# Patient Record
Sex: MALE | Race: WHITE | NOT HISPANIC OR LATINO | Employment: OTHER | URBAN - METROPOLITAN AREA
[De-identification: names, ages, dates, MRNs, and addresses within clinical notes are randomized per-mention and may not be internally consistent; named-entity substitution may affect disease eponyms.]

---

## 2017-01-02 ENCOUNTER — COMMUNICATION - HEALTHEAST (OUTPATIENT)
Dept: INTERNAL MEDICINE | Facility: CLINIC | Age: 72
End: 2017-01-02

## 2017-01-03 ENCOUNTER — COMMUNICATION - HEALTHEAST (OUTPATIENT)
Dept: ONCOLOGY | Facility: HOSPITAL | Age: 72
End: 2017-01-03

## 2017-01-05 ENCOUNTER — COMMUNICATION - HEALTHEAST (OUTPATIENT)
Dept: INTERNAL MEDICINE | Facility: CLINIC | Age: 72
End: 2017-01-05

## 2017-01-06 ENCOUNTER — COMMUNICATION - HEALTHEAST (OUTPATIENT)
Dept: ONCOLOGY | Facility: HOSPITAL | Age: 72
End: 2017-01-06

## 2017-01-06 ENCOUNTER — COMMUNICATION - HEALTHEAST (OUTPATIENT)
Dept: INTERNAL MEDICINE | Facility: CLINIC | Age: 72
End: 2017-01-06

## 2017-01-15 ENCOUNTER — COMMUNICATION - HEALTHEAST (OUTPATIENT)
Dept: ONCOLOGY | Facility: HOSPITAL | Age: 72
End: 2017-01-15

## 2017-01-16 ENCOUNTER — AMBULATORY - HEALTHEAST (OUTPATIENT)
Dept: ONCOLOGY | Facility: HOSPITAL | Age: 72
End: 2017-01-16

## 2017-01-16 DIAGNOSIS — C61 PROSTATE CANCER (H): ICD-10-CM

## 2017-01-26 ENCOUNTER — COMMUNICATION - HEALTHEAST (OUTPATIENT)
Dept: ONCOLOGY | Facility: HOSPITAL | Age: 72
End: 2017-01-26

## 2017-02-10 ENCOUNTER — COMMUNICATION - HEALTHEAST (OUTPATIENT)
Dept: INTERNAL MEDICINE | Facility: CLINIC | Age: 72
End: 2017-02-10

## 2017-02-10 ENCOUNTER — COMMUNICATION - HEALTHEAST (OUTPATIENT)
Dept: ONCOLOGY | Facility: HOSPITAL | Age: 72
End: 2017-02-10

## 2017-02-16 ENCOUNTER — COMMUNICATION - HEALTHEAST (OUTPATIENT)
Dept: INTERNAL MEDICINE | Facility: CLINIC | Age: 72
End: 2017-02-16

## 2017-02-16 ENCOUNTER — AMBULATORY - HEALTHEAST (OUTPATIENT)
Dept: INTERNAL MEDICINE | Facility: CLINIC | Age: 72
End: 2017-02-16

## 2017-02-16 DIAGNOSIS — R19.7 DIARRHEA: ICD-10-CM

## 2017-02-24 ENCOUNTER — COMMUNICATION - HEALTHEAST (OUTPATIENT)
Dept: INTERNAL MEDICINE | Facility: CLINIC | Age: 72
End: 2017-02-24

## 2017-02-24 DIAGNOSIS — E03.9 HYPOTHYROID: ICD-10-CM

## 2017-02-24 DIAGNOSIS — F32.9 MAJOR DEPRESSION, SINGLE EPISODE: ICD-10-CM

## 2017-02-27 ENCOUNTER — AMBULATORY - HEALTHEAST (OUTPATIENT)
Dept: INTERNAL MEDICINE | Facility: CLINIC | Age: 72
End: 2017-02-27

## 2017-02-27 ENCOUNTER — COMMUNICATION - HEALTHEAST (OUTPATIENT)
Dept: INTERNAL MEDICINE | Facility: CLINIC | Age: 72
End: 2017-02-27

## 2017-02-27 ENCOUNTER — OFFICE VISIT - HEALTHEAST (OUTPATIENT)
Dept: ONCOLOGY | Facility: HOSPITAL | Age: 72
End: 2017-02-27

## 2017-02-27 ENCOUNTER — AMBULATORY - HEALTHEAST (OUTPATIENT)
Dept: INFUSION THERAPY | Facility: HOSPITAL | Age: 72
End: 2017-02-27

## 2017-02-27 DIAGNOSIS — R29.91 MUSCULOSKELETAL SYMPTOM: ICD-10-CM

## 2017-02-27 DIAGNOSIS — C61 PROSTATE CANCER (H): ICD-10-CM

## 2017-02-27 DIAGNOSIS — R19.7 DIARRHEA: ICD-10-CM

## 2017-02-27 DIAGNOSIS — K52.1 DIARRHEA DUE TO DRUG: ICD-10-CM

## 2017-02-27 LAB — PSA SERPL-MCNC: <0.1 NG/ML (ref 0–6.5)

## 2017-03-01 ENCOUNTER — COMMUNICATION - HEALTHEAST (OUTPATIENT)
Dept: INTERNAL MEDICINE | Facility: CLINIC | Age: 72
End: 2017-03-01

## 2017-03-01 ENCOUNTER — COMMUNICATION - HEALTHEAST (OUTPATIENT)
Dept: ONCOLOGY | Facility: HOSPITAL | Age: 72
End: 2017-03-01

## 2017-03-06 ENCOUNTER — COMMUNICATION - HEALTHEAST (OUTPATIENT)
Dept: INTERNAL MEDICINE | Facility: CLINIC | Age: 72
End: 2017-03-06

## 2017-03-06 DIAGNOSIS — R19.7 DIARRHEA: ICD-10-CM

## 2017-03-13 ENCOUNTER — COMMUNICATION - HEALTHEAST (OUTPATIENT)
Dept: ONCOLOGY | Facility: HOSPITAL | Age: 72
End: 2017-03-13

## 2017-03-24 ENCOUNTER — COMMUNICATION - HEALTHEAST (OUTPATIENT)
Dept: ADMINISTRATIVE | Facility: HOSPITAL | Age: 72
End: 2017-03-24

## 2017-03-28 ENCOUNTER — COMMUNICATION - HEALTHEAST (OUTPATIENT)
Dept: INTERNAL MEDICINE | Facility: CLINIC | Age: 72
End: 2017-03-28

## 2017-03-30 ENCOUNTER — COMMUNICATION - HEALTHEAST (OUTPATIENT)
Dept: ONCOLOGY | Facility: HOSPITAL | Age: 72
End: 2017-03-30

## 2017-04-18 ENCOUNTER — RECORDS - HEALTHEAST (OUTPATIENT)
Dept: ADMINISTRATIVE | Facility: OTHER | Age: 72
End: 2017-04-18

## 2017-04-19 ENCOUNTER — RECORDS - HEALTHEAST (OUTPATIENT)
Dept: ADMINISTRATIVE | Facility: OTHER | Age: 72
End: 2017-04-19

## 2017-04-28 ENCOUNTER — COMMUNICATION - HEALTHEAST (OUTPATIENT)
Dept: ONCOLOGY | Facility: HOSPITAL | Age: 72
End: 2017-04-28

## 2017-04-28 ENCOUNTER — COMMUNICATION - HEALTHEAST (OUTPATIENT)
Dept: INTERNAL MEDICINE | Facility: CLINIC | Age: 72
End: 2017-04-28

## 2017-05-03 ENCOUNTER — COMMUNICATION - HEALTHEAST (OUTPATIENT)
Dept: ADMINISTRATIVE | Facility: HOSPITAL | Age: 72
End: 2017-05-03

## 2017-05-15 ENCOUNTER — AMBULATORY - HEALTHEAST (OUTPATIENT)
Dept: INTERNAL MEDICINE | Facility: CLINIC | Age: 72
End: 2017-05-15

## 2017-05-17 ENCOUNTER — OFFICE VISIT - HEALTHEAST (OUTPATIENT)
Dept: INTERNAL MEDICINE | Facility: CLINIC | Age: 72
End: 2017-05-17

## 2017-05-17 DIAGNOSIS — Z00.00 ANNUAL PHYSICAL EXAM: ICD-10-CM

## 2017-05-17 DIAGNOSIS — R19.7 DIARRHEA: ICD-10-CM

## 2017-05-17 DIAGNOSIS — E02 SUBCLINICAL IODINE-DEFICIENCY HYPOTHYROIDISM: ICD-10-CM

## 2017-05-17 DIAGNOSIS — R19.7 DIARRHEA, UNSPECIFIED TYPE: ICD-10-CM

## 2017-05-17 LAB
CHOLEST SERPL-MCNC: 254 MG/DL
FASTING STATUS PATIENT QL REPORTED: NO
HDLC SERPL-MCNC: 40 MG/DL
LDLC SERPL CALC-MCNC: 136 MG/DL
TRIGL SERPL-MCNC: 392 MG/DL

## 2017-05-17 ASSESSMENT — MIFFLIN-ST. JEOR: SCORE: 1688.8

## 2017-05-18 LAB
TTG IGA SER-ACNC: 0.6 U/ML
TTG IGG SER-ACNC: <0.6 U/ML

## 2017-05-19 ENCOUNTER — COMMUNICATION - HEALTHEAST (OUTPATIENT)
Dept: INTERNAL MEDICINE | Facility: CLINIC | Age: 72
End: 2017-05-19

## 2017-05-24 ENCOUNTER — AMBULATORY - HEALTHEAST (OUTPATIENT)
Dept: LAB | Facility: CLINIC | Age: 72
End: 2017-05-24

## 2017-05-24 DIAGNOSIS — R19.7 DIARRHEA: ICD-10-CM

## 2017-05-24 DIAGNOSIS — R19.7 DIARRHEA, UNSPECIFIED TYPE: ICD-10-CM

## 2017-05-30 ENCOUNTER — COMMUNICATION - HEALTHEAST (OUTPATIENT)
Dept: INTERNAL MEDICINE | Facility: CLINIC | Age: 72
End: 2017-05-30

## 2017-06-01 ENCOUNTER — COMMUNICATION - HEALTHEAST (OUTPATIENT)
Dept: ADMINISTRATIVE | Facility: HOSPITAL | Age: 72
End: 2017-06-01

## 2017-06-01 ENCOUNTER — COMMUNICATION - HEALTHEAST (OUTPATIENT)
Dept: INTERNAL MEDICINE | Facility: CLINIC | Age: 72
End: 2017-06-01

## 2017-06-02 ENCOUNTER — COMMUNICATION - HEALTHEAST (OUTPATIENT)
Dept: ADMINISTRATIVE | Facility: HOSPITAL | Age: 72
End: 2017-06-02

## 2017-06-12 ENCOUNTER — COMMUNICATION - HEALTHEAST (OUTPATIENT)
Dept: INTERNAL MEDICINE | Facility: CLINIC | Age: 72
End: 2017-06-12

## 2017-06-14 ENCOUNTER — RECORDS - HEALTHEAST (OUTPATIENT)
Dept: ADMINISTRATIVE | Facility: OTHER | Age: 72
End: 2017-06-14

## 2017-08-21 ENCOUNTER — RECORDS - HEALTHEAST (OUTPATIENT)
Dept: ADMINISTRATIVE | Facility: OTHER | Age: 72
End: 2017-08-21

## 2017-08-25 ENCOUNTER — RECORDS - HEALTHEAST (OUTPATIENT)
Dept: ADMINISTRATIVE | Facility: OTHER | Age: 72
End: 2017-08-25

## 2017-08-25 ENCOUNTER — COMMUNICATION - HEALTHEAST (OUTPATIENT)
Dept: ONCOLOGY | Facility: HOSPITAL | Age: 72
End: 2017-08-25

## 2017-08-31 ENCOUNTER — INFUSION - HEALTHEAST (OUTPATIENT)
Dept: INFUSION THERAPY | Facility: HOSPITAL | Age: 72
End: 2017-08-31

## 2017-08-31 DIAGNOSIS — C61 PROSTATE CANCER (H): ICD-10-CM

## 2017-09-11 ENCOUNTER — COMMUNICATION - HEALTHEAST (OUTPATIENT)
Dept: ONCOLOGY | Facility: HOSPITAL | Age: 72
End: 2017-09-11

## 2017-09-13 ENCOUNTER — COMMUNICATION - HEALTHEAST (OUTPATIENT)
Dept: INTERNAL MEDICINE | Facility: CLINIC | Age: 72
End: 2017-09-13

## 2017-09-13 ENCOUNTER — COMMUNICATION - HEALTHEAST (OUTPATIENT)
Dept: SCHEDULING | Facility: CLINIC | Age: 72
End: 2017-09-13

## 2017-10-16 ENCOUNTER — COMMUNICATION - HEALTHEAST (OUTPATIENT)
Dept: INTERNAL MEDICINE | Facility: CLINIC | Age: 72
End: 2017-10-16

## 2017-10-21 ENCOUNTER — COMMUNICATION - HEALTHEAST (OUTPATIENT)
Dept: INTERNAL MEDICINE | Facility: CLINIC | Age: 72
End: 2017-10-21

## 2017-11-03 ENCOUNTER — COMMUNICATION - HEALTHEAST (OUTPATIENT)
Dept: ONCOLOGY | Facility: HOSPITAL | Age: 72
End: 2017-11-03

## 2017-11-10 ENCOUNTER — COMMUNICATION - HEALTHEAST (OUTPATIENT)
Dept: ADMINISTRATIVE | Facility: HOSPITAL | Age: 72
End: 2017-11-10

## 2017-11-30 ENCOUNTER — COMMUNICATION - HEALTHEAST (OUTPATIENT)
Dept: INTERNAL MEDICINE | Facility: CLINIC | Age: 72
End: 2017-11-30

## 2017-12-05 ENCOUNTER — OFFICE VISIT - HEALTHEAST (OUTPATIENT)
Dept: INTERNAL MEDICINE | Facility: CLINIC | Age: 72
End: 2017-12-05

## 2017-12-05 DIAGNOSIS — D12.4 BENIGN NEOPLASM OF DESCENDING COLON: ICD-10-CM

## 2017-12-05 DIAGNOSIS — C61 PROSTATE CANCER (H): ICD-10-CM

## 2017-12-05 DIAGNOSIS — M65.30 TRIGGER FINGER, UNSPECIFIED FINGER, UNSPECIFIED LATERALITY: ICD-10-CM

## 2017-12-05 DIAGNOSIS — R19.7 DIARRHEA, UNSPECIFIED TYPE: ICD-10-CM

## 2017-12-05 ASSESSMENT — MIFFLIN-ST. JEOR: SCORE: 1699.23

## 2017-12-11 ENCOUNTER — RECORDS - HEALTHEAST (OUTPATIENT)
Dept: ADMINISTRATIVE | Facility: OTHER | Age: 72
End: 2017-12-11

## 2017-12-12 ENCOUNTER — RECORDS - HEALTHEAST (OUTPATIENT)
Dept: ADMINISTRATIVE | Facility: OTHER | Age: 72
End: 2017-12-12

## 2017-12-12 ENCOUNTER — OFFICE VISIT - HEALTHEAST (OUTPATIENT)
Dept: OCCUPATIONAL THERAPY | Facility: REHABILITATION | Age: 72
End: 2017-12-12

## 2017-12-12 DIAGNOSIS — Z78.9 DECREASED ACTIVITIES OF DAILY LIVING (ADL): ICD-10-CM

## 2017-12-12 DIAGNOSIS — M79.642 BILATERAL HAND PAIN: ICD-10-CM

## 2017-12-12 DIAGNOSIS — R29.898 WEAKNESS OF RIGHT HAND: ICD-10-CM

## 2017-12-12 DIAGNOSIS — M79.641 BILATERAL HAND PAIN: ICD-10-CM

## 2017-12-12 DIAGNOSIS — R29.898 WEAKNESS OF LEFT HAND: ICD-10-CM

## 2017-12-14 ENCOUNTER — COMMUNICATION - HEALTHEAST (OUTPATIENT)
Dept: ONCOLOGY | Facility: HOSPITAL | Age: 72
End: 2017-12-14

## 2017-12-21 ENCOUNTER — INFUSION - HEALTHEAST (OUTPATIENT)
Dept: INFUSION THERAPY | Facility: HOSPITAL | Age: 72
End: 2017-12-21

## 2017-12-21 ENCOUNTER — OFFICE VISIT - HEALTHEAST (OUTPATIENT)
Dept: ONCOLOGY | Facility: HOSPITAL | Age: 72
End: 2017-12-21

## 2017-12-21 ENCOUNTER — AMBULATORY - HEALTHEAST (OUTPATIENT)
Dept: INFUSION THERAPY | Facility: HOSPITAL | Age: 72
End: 2017-12-21

## 2017-12-21 DIAGNOSIS — C61 PROSTATE CANCER (H): ICD-10-CM

## 2017-12-21 LAB — PSA SERPL-MCNC: <0.1 NG/ML (ref 0–6.5)

## 2018-02-22 ENCOUNTER — COMMUNICATION - HEALTHEAST (OUTPATIENT)
Dept: INTERNAL MEDICINE | Facility: CLINIC | Age: 73
End: 2018-02-22

## 2018-03-08 ENCOUNTER — COMMUNICATION - HEALTHEAST (OUTPATIENT)
Dept: INTERNAL MEDICINE | Facility: CLINIC | Age: 73
End: 2018-03-08

## 2018-03-15 ENCOUNTER — COMMUNICATION - HEALTHEAST (OUTPATIENT)
Dept: INTERNAL MEDICINE | Facility: CLINIC | Age: 73
End: 2018-03-15

## 2018-03-15 DIAGNOSIS — F32.9 MAJOR DEPRESSION, SINGLE EPISODE: ICD-10-CM

## 2018-03-20 ENCOUNTER — COMMUNICATION - HEALTHEAST (OUTPATIENT)
Dept: INTERNAL MEDICINE | Facility: CLINIC | Age: 73
End: 2018-03-20

## 2018-04-13 ENCOUNTER — COMMUNICATION - HEALTHEAST (OUTPATIENT)
Dept: INTERNAL MEDICINE | Facility: CLINIC | Age: 73
End: 2018-04-13

## 2018-04-16 ENCOUNTER — COMMUNICATION - HEALTHEAST (OUTPATIENT)
Dept: INTERNAL MEDICINE | Facility: CLINIC | Age: 73
End: 2018-04-16

## 2018-04-16 DIAGNOSIS — E03.9 HYPOTHYROIDISM: ICD-10-CM

## 2018-04-17 ENCOUNTER — RECORDS - HEALTHEAST (OUTPATIENT)
Dept: ADMINISTRATIVE | Facility: OTHER | Age: 73
End: 2018-04-17

## 2018-04-26 ENCOUNTER — COMMUNICATION - HEALTHEAST (OUTPATIENT)
Dept: INTERNAL MEDICINE | Facility: CLINIC | Age: 73
End: 2018-04-26

## 2018-04-27 ENCOUNTER — AMBULATORY - HEALTHEAST (OUTPATIENT)
Dept: ONCOLOGY | Facility: HOSPITAL | Age: 73
End: 2018-04-27

## 2018-04-27 ENCOUNTER — COMMUNICATION - HEALTHEAST (OUTPATIENT)
Dept: ONCOLOGY | Facility: HOSPITAL | Age: 73
End: 2018-04-27

## 2018-04-30 ENCOUNTER — COMMUNICATION - HEALTHEAST (OUTPATIENT)
Dept: INTERNAL MEDICINE | Facility: CLINIC | Age: 73
End: 2018-04-30

## 2018-04-30 DIAGNOSIS — F32.9 MAJOR DEPRESSION, SINGLE EPISODE: ICD-10-CM

## 2018-05-15 ENCOUNTER — COMMUNICATION - HEALTHEAST (OUTPATIENT)
Dept: INTERNAL MEDICINE | Facility: CLINIC | Age: 73
End: 2018-05-15

## 2018-05-16 ENCOUNTER — COMMUNICATION - HEALTHEAST (OUTPATIENT)
Dept: OCCUPATIONAL THERAPY | Facility: REHABILITATION | Age: 73
End: 2018-05-16

## 2018-05-16 ENCOUNTER — OFFICE VISIT - HEALTHEAST (OUTPATIENT)
Dept: INTERNAL MEDICINE | Facility: CLINIC | Age: 73
End: 2018-05-16

## 2018-05-16 DIAGNOSIS — C61 PROSTATE CANCER (H): ICD-10-CM

## 2018-05-16 DIAGNOSIS — F32.5 MAJOR DEPRESSIVE DISORDER WITH SINGLE EPISODE, IN FULL REMISSION (H): ICD-10-CM

## 2018-05-16 DIAGNOSIS — E03.4 HYPOTHYROIDISM DUE TO ACQUIRED ATROPHY OF THYROID: ICD-10-CM

## 2018-05-16 DIAGNOSIS — Z13.1 ENCOUNTER FOR SCREENING FOR DIABETES MELLITUS: ICD-10-CM

## 2018-05-16 DIAGNOSIS — E78.9 LIPID DISORDER: ICD-10-CM

## 2018-05-16 DIAGNOSIS — E55.9 VITAMIN D DEFICIENCY: ICD-10-CM

## 2018-05-16 DIAGNOSIS — Z00.00 ROUTINE GENERAL MEDICAL EXAMINATION AT A HEALTH CARE FACILITY: ICD-10-CM

## 2018-05-16 DIAGNOSIS — Z13.220 ENCOUNTER FOR SCREENING FOR LIPOID DISORDERS: ICD-10-CM

## 2018-05-16 LAB
FASTING STATUS PATIENT QL REPORTED: NO
GLUCOSE BLD-MCNC: 70 MG/DL (ref 74–125)

## 2018-05-16 ASSESSMENT — MIFFLIN-ST. JEOR: SCORE: 1701.96

## 2018-05-17 LAB
CHOLEST SERPL-MCNC: 202 MG/DL
FASTING STATUS PATIENT QL REPORTED: NO
HDLC SERPL-MCNC: 37 MG/DL
LDLC SERPL CALC-MCNC: 103 MG/DL
LDLC SERPL CALC-MCNC: ABNORMAL MG/DL
TRIGL SERPL-MCNC: 473 MG/DL
TSH SERPL DL<=0.005 MIU/L-ACNC: 0.25 UIU/ML (ref 0.3–5)

## 2018-05-18 LAB
25(OH)D3 SERPL-MCNC: 49.5 NG/ML (ref 30–80)
25(OH)D3 SERPL-MCNC: 49.5 NG/ML (ref 30–80)

## 2018-05-21 ENCOUNTER — COMMUNICATION - HEALTHEAST (OUTPATIENT)
Dept: ONCOLOGY | Facility: HOSPITAL | Age: 73
End: 2018-05-21

## 2018-05-22 ENCOUNTER — RECORDS - HEALTHEAST (OUTPATIENT)
Dept: ADMINISTRATIVE | Facility: OTHER | Age: 73
End: 2018-05-22

## 2018-05-22 ENCOUNTER — RECORDS - HEALTHEAST (OUTPATIENT)
Dept: BONE DENSITY | Facility: CLINIC | Age: 73
End: 2018-05-22

## 2018-05-22 DIAGNOSIS — C61 MALIGNANT NEOPLASM OF PROSTATE (H): ICD-10-CM

## 2018-05-23 ENCOUNTER — INFUSION - HEALTHEAST (OUTPATIENT)
Dept: INFUSION THERAPY | Facility: HOSPITAL | Age: 73
End: 2018-05-23

## 2018-05-23 DIAGNOSIS — C61 PROSTATE CANCER (H): ICD-10-CM

## 2018-05-24 ENCOUNTER — COMMUNICATION - HEALTHEAST (OUTPATIENT)
Dept: ONCOLOGY | Facility: HOSPITAL | Age: 73
End: 2018-05-24

## 2018-05-29 ENCOUNTER — COMMUNICATION - HEALTHEAST (OUTPATIENT)
Dept: INTERNAL MEDICINE | Facility: CLINIC | Age: 73
End: 2018-05-29

## 2018-05-29 ENCOUNTER — AMBULATORY - HEALTHEAST (OUTPATIENT)
Dept: INTERNAL MEDICINE | Facility: CLINIC | Age: 73
End: 2018-05-29

## 2018-05-29 DIAGNOSIS — M81.0 AGE-RELATED OSTEOPOROSIS WITHOUT CURRENT PATHOLOGICAL FRACTURE: ICD-10-CM

## 2018-10-16 ENCOUNTER — COMMUNICATION - HEALTHEAST (OUTPATIENT)
Dept: INTERNAL MEDICINE | Facility: CLINIC | Age: 73
End: 2018-10-16

## 2018-10-17 ENCOUNTER — OFFICE VISIT - HEALTHEAST (OUTPATIENT)
Dept: INTERNAL MEDICINE | Facility: CLINIC | Age: 73
End: 2018-10-17

## 2018-10-17 ENCOUNTER — COMMUNICATION - HEALTHEAST (OUTPATIENT)
Dept: INTERNAL MEDICINE | Facility: CLINIC | Age: 73
End: 2018-10-17

## 2018-10-17 DIAGNOSIS — C61 PROSTATE CANCER (H): ICD-10-CM

## 2018-10-17 DIAGNOSIS — R25.2 MUSCLE CRAMPS: ICD-10-CM

## 2018-10-17 DIAGNOSIS — F32.5 MAJOR DEPRESSIVE DISORDER WITH SINGLE EPISODE, IN FULL REMISSION (H): ICD-10-CM

## 2018-10-17 DIAGNOSIS — E03.4 HYPOTHYROIDISM DUE TO ACQUIRED ATROPHY OF THYROID: ICD-10-CM

## 2018-10-17 DIAGNOSIS — R53.83 FATIGUE, UNSPECIFIED TYPE: ICD-10-CM

## 2018-10-17 DIAGNOSIS — Z23 FLU VACCINE NEED: ICD-10-CM

## 2018-10-17 DIAGNOSIS — G62.9 NEUROPATHY: ICD-10-CM

## 2018-10-17 LAB
ALBUMIN SERPL-MCNC: 4 G/DL (ref 3.5–5)
ALP SERPL-CCNC: 91 U/L (ref 45–120)
ALT SERPL W P-5'-P-CCNC: 35 U/L (ref 0–45)
ANION GAP SERPL CALCULATED.3IONS-SCNC: 8 MMOL/L (ref 5–18)
AST SERPL W P-5'-P-CCNC: 39 U/L (ref 0–40)
BILIRUB SERPL-MCNC: 0.7 MG/DL (ref 0–1)
BUN SERPL-MCNC: 10 MG/DL (ref 8–28)
CALCIUM SERPL-MCNC: 9.6 MG/DL (ref 8.5–10.5)
CHLORIDE BLD-SCNC: 107 MMOL/L (ref 98–107)
CO2 SERPL-SCNC: 24 MMOL/L (ref 22–31)
CREAT SERPL-MCNC: 0.72 MG/DL (ref 0.7–1.3)
ERYTHROCYTE [DISTWIDTH] IN BLOOD BY AUTOMATED COUNT: 11 % (ref 11–14.5)
GFR SERPL CREATININE-BSD FRML MDRD: >60 ML/MIN/1.73M2
GLUCOSE BLD-MCNC: 97 MG/DL (ref 70–125)
HCT VFR BLD AUTO: 42 % (ref 40–54)
HGB BLD-MCNC: 14.6 G/DL (ref 14–18)
MCH RBC QN AUTO: 33.4 PG (ref 27–34)
MCHC RBC AUTO-ENTMCNC: 34.8 G/DL (ref 32–36)
MCV RBC AUTO: 96 FL (ref 80–100)
PLATELET # BLD AUTO: 213 THOU/UL (ref 140–440)
PMV BLD AUTO: 6.5 FL (ref 7–10)
POTASSIUM BLD-SCNC: 4.4 MMOL/L (ref 3.5–5)
PROT SERPL-MCNC: 6.6 G/DL (ref 6–8)
RBC # BLD AUTO: 4.38 MILL/UL (ref 4.4–6.2)
SODIUM SERPL-SCNC: 139 MMOL/L (ref 136–145)
TSH SERPL DL<=0.005 MIU/L-ACNC: 0.46 UIU/ML (ref 0.3–5)
URATE SERPL-MCNC: 6.7 MG/DL (ref 3–8)
VIT B12 SERPL-MCNC: 521 PG/ML (ref 213–816)
WBC: 4.9 THOU/UL (ref 4–11)

## 2018-10-19 ENCOUNTER — COMMUNICATION - HEALTHEAST (OUTPATIENT)
Dept: INTERNAL MEDICINE | Facility: CLINIC | Age: 73
End: 2018-10-19

## 2018-10-23 ENCOUNTER — COMMUNICATION - HEALTHEAST (OUTPATIENT)
Dept: INTERNAL MEDICINE | Facility: CLINIC | Age: 73
End: 2018-10-23

## 2018-12-11 ENCOUNTER — COMMUNICATION - HEALTHEAST (OUTPATIENT)
Dept: INTERNAL MEDICINE | Facility: CLINIC | Age: 73
End: 2018-12-11

## 2018-12-11 DIAGNOSIS — E78.9 LIPID DISORDER: ICD-10-CM

## 2019-02-04 ENCOUNTER — COMMUNICATION - HEALTHEAST (OUTPATIENT)
Dept: INTERNAL MEDICINE | Facility: CLINIC | Age: 74
End: 2019-02-04

## 2019-02-04 DIAGNOSIS — G62.9 NEUROPATHY: ICD-10-CM

## 2019-02-04 DIAGNOSIS — F32.9 MAJOR DEPRESSION, SINGLE EPISODE: ICD-10-CM

## 2019-02-04 DIAGNOSIS — E03.4 HYPOTHYROIDISM DUE TO ACQUIRED ATROPHY OF THYROID: ICD-10-CM

## 2019-02-04 DIAGNOSIS — E78.9 LIPID DISORDER: ICD-10-CM

## 2019-02-07 ENCOUNTER — COMMUNICATION - HEALTHEAST (OUTPATIENT)
Dept: ONCOLOGY | Facility: HOSPITAL | Age: 74
End: 2019-02-07

## 2019-02-07 ENCOUNTER — COMMUNICATION - HEALTHEAST (OUTPATIENT)
Dept: INTERNAL MEDICINE | Facility: CLINIC | Age: 74
End: 2019-02-07

## 2019-02-08 ENCOUNTER — COMMUNICATION - HEALTHEAST (OUTPATIENT)
Dept: INTERNAL MEDICINE | Facility: CLINIC | Age: 74
End: 2019-02-08

## 2019-02-21 ENCOUNTER — COMMUNICATION - HEALTHEAST (OUTPATIENT)
Dept: INTERNAL MEDICINE | Facility: CLINIC | Age: 74
End: 2019-02-21

## 2019-02-21 DIAGNOSIS — F52.8 PSYCHOSEXUAL DYSFUNCTION WITH INHIBITED SEXUAL EXCITEMENT: ICD-10-CM

## 2019-03-31 ENCOUNTER — COMMUNICATION - HEALTHEAST (OUTPATIENT)
Dept: INTERNAL MEDICINE | Facility: CLINIC | Age: 74
End: 2019-03-31

## 2019-04-01 ENCOUNTER — COMMUNICATION - HEALTHEAST (OUTPATIENT)
Dept: INTERNAL MEDICINE | Facility: CLINIC | Age: 74
End: 2019-04-01

## 2019-04-01 ENCOUNTER — AMBULATORY - HEALTHEAST (OUTPATIENT)
Dept: INTERNAL MEDICINE | Facility: CLINIC | Age: 74
End: 2019-04-01

## 2019-04-01 DIAGNOSIS — L03.119 CELLULITIS OF ANKLE: ICD-10-CM

## 2019-04-10 ENCOUNTER — OFFICE VISIT - HEALTHEAST (OUTPATIENT)
Dept: INTERNAL MEDICINE | Facility: CLINIC | Age: 74
End: 2019-04-10

## 2019-04-10 DIAGNOSIS — M94.9 DISORDER OF BONE AND CARTILAGE: ICD-10-CM

## 2019-04-10 DIAGNOSIS — S91.012S: ICD-10-CM

## 2019-04-10 DIAGNOSIS — E03.4 HYPOTHYROIDISM DUE TO ACQUIRED ATROPHY OF THYROID: ICD-10-CM

## 2019-04-10 DIAGNOSIS — C61 PROSTATE CANCER (H): ICD-10-CM

## 2019-04-10 DIAGNOSIS — F32.5 MAJOR DEPRESSIVE DISORDER WITH SINGLE EPISODE, IN FULL REMISSION (H): ICD-10-CM

## 2019-04-10 DIAGNOSIS — M89.9 DISORDER OF BONE AND CARTILAGE: ICD-10-CM

## 2019-04-10 DIAGNOSIS — G62.9 NEUROPATHY: ICD-10-CM

## 2019-04-10 LAB — PSA SERPL-MCNC: <0.1 NG/ML (ref 0–6.5)

## 2019-04-10 ASSESSMENT — MIFFLIN-ST. JEOR: SCORE: 1727.07

## 2019-04-11 ENCOUNTER — RECORDS - HEALTHEAST (OUTPATIENT)
Dept: ADMINISTRATIVE | Facility: OTHER | Age: 74
End: 2019-04-11

## 2019-04-11 ENCOUNTER — OFFICE VISIT - HEALTHEAST (OUTPATIENT)
Dept: VASCULAR SURGERY | Facility: CLINIC | Age: 74
End: 2019-04-11

## 2019-04-11 DIAGNOSIS — E03.4 HYPOTHYROIDISM DUE TO ACQUIRED ATROPHY OF THYROID: ICD-10-CM

## 2019-04-11 DIAGNOSIS — E55.9 VITAMIN D DEFICIENCY: ICD-10-CM

## 2019-04-11 DIAGNOSIS — L97.322 SKIN ULCER OF LEFT ANKLE WITH FAT LAYER EXPOSED (H): ICD-10-CM

## 2019-04-11 DIAGNOSIS — G62.9 NEUROPATHY: ICD-10-CM

## 2019-04-11 ASSESSMENT — MIFFLIN-ST. JEOR: SCORE: 1696.06

## 2019-05-17 ENCOUNTER — RECORDS - HEALTHEAST (OUTPATIENT)
Dept: ADMINISTRATIVE | Facility: OTHER | Age: 74
End: 2019-05-17

## 2019-05-22 ENCOUNTER — RECORDS - HEALTHEAST (OUTPATIENT)
Dept: ADMINISTRATIVE | Facility: OTHER | Age: 74
End: 2019-05-22

## 2019-05-27 ENCOUNTER — COMMUNICATION - HEALTHEAST (OUTPATIENT)
Dept: INTERNAL MEDICINE | Facility: CLINIC | Age: 74
End: 2019-05-27

## 2019-05-27 DIAGNOSIS — F32.9 MAJOR DEPRESSION, SINGLE EPISODE: ICD-10-CM

## 2019-05-28 ENCOUNTER — COMMUNICATION - HEALTHEAST (OUTPATIENT)
Dept: INTERNAL MEDICINE | Facility: CLINIC | Age: 74
End: 2019-05-28

## 2019-05-29 ENCOUNTER — COMMUNICATION - HEALTHEAST (OUTPATIENT)
Dept: INTERNAL MEDICINE | Facility: CLINIC | Age: 74
End: 2019-05-29

## 2019-10-10 ENCOUNTER — COMMUNICATION - HEALTHEAST (OUTPATIENT)
Dept: INTERNAL MEDICINE | Facility: CLINIC | Age: 74
End: 2019-10-10

## 2019-10-21 ENCOUNTER — COMMUNICATION - HEALTHEAST (OUTPATIENT)
Dept: INTERNAL MEDICINE | Facility: CLINIC | Age: 74
End: 2019-10-21

## 2019-10-21 ENCOUNTER — AMBULATORY - HEALTHEAST (OUTPATIENT)
Dept: INTERNAL MEDICINE | Facility: CLINIC | Age: 74
End: 2019-10-21

## 2019-10-21 DIAGNOSIS — L30.9 DERMATITIS: ICD-10-CM

## 2019-10-25 ENCOUNTER — AMBULATORY - HEALTHEAST (OUTPATIENT)
Dept: NURSING | Facility: CLINIC | Age: 74
End: 2019-10-25

## 2019-10-25 DIAGNOSIS — Z23 FLU VACCINE NEED: ICD-10-CM

## 2019-11-04 ENCOUNTER — COMMUNICATION - HEALTHEAST (OUTPATIENT)
Dept: INTERNAL MEDICINE | Facility: CLINIC | Age: 74
End: 2019-11-04

## 2019-11-25 ENCOUNTER — COMMUNICATION - HEALTHEAST (OUTPATIENT)
Dept: INTERNAL MEDICINE | Facility: CLINIC | Age: 74
End: 2019-11-25

## 2019-11-25 DIAGNOSIS — E78.9 LIPID DISORDER: ICD-10-CM

## 2019-11-25 DIAGNOSIS — F32.9 MAJOR DEPRESSION, SINGLE EPISODE: ICD-10-CM

## 2020-01-17 ENCOUNTER — COMMUNICATION - HEALTHEAST (OUTPATIENT)
Dept: SCHEDULING | Facility: CLINIC | Age: 75
End: 2020-01-17

## 2020-01-17 ENCOUNTER — COMMUNICATION - HEALTHEAST (OUTPATIENT)
Dept: INTERNAL MEDICINE | Facility: CLINIC | Age: 75
End: 2020-01-17

## 2020-01-17 DIAGNOSIS — M89.9 DISORDER OF BONE AND CARTILAGE: ICD-10-CM

## 2020-01-17 DIAGNOSIS — M94.9 DISORDER OF BONE AND CARTILAGE: ICD-10-CM

## 2020-01-27 ENCOUNTER — COMMUNICATION - HEALTHEAST (OUTPATIENT)
Dept: INTERNAL MEDICINE | Facility: CLINIC | Age: 75
End: 2020-01-27

## 2020-01-27 DIAGNOSIS — M94.9 DISORDER OF BONE AND CARTILAGE: ICD-10-CM

## 2020-01-27 DIAGNOSIS — M89.9 DISORDER OF BONE AND CARTILAGE: ICD-10-CM

## 2020-02-27 ENCOUNTER — COMMUNICATION - HEALTHEAST (OUTPATIENT)
Dept: SCHEDULING | Facility: CLINIC | Age: 75
End: 2020-02-27

## 2020-02-27 DIAGNOSIS — E78.9 LIPID DISORDER: ICD-10-CM

## 2020-02-27 DIAGNOSIS — F32.5 MAJOR DEPRESSIVE DISORDER WITH SINGLE EPISODE, IN FULL REMISSION (H): ICD-10-CM

## 2020-02-27 DIAGNOSIS — E03.4 HYPOTHYROIDISM DUE TO ACQUIRED ATROPHY OF THYROID: ICD-10-CM

## 2020-04-06 ENCOUNTER — COMMUNICATION - HEALTHEAST (OUTPATIENT)
Dept: INTERNAL MEDICINE | Facility: CLINIC | Age: 75
End: 2020-04-06

## 2020-04-10 ENCOUNTER — COMMUNICATION - HEALTHEAST (OUTPATIENT)
Dept: INTERNAL MEDICINE | Facility: CLINIC | Age: 75
End: 2020-04-10

## 2020-04-13 ENCOUNTER — OFFICE VISIT - HEALTHEAST (OUTPATIENT)
Dept: INTERNAL MEDICINE | Facility: CLINIC | Age: 75
End: 2020-04-13

## 2020-04-13 DIAGNOSIS — G62.9 NEUROPATHY: ICD-10-CM

## 2020-04-13 DIAGNOSIS — E78.9 LIPID DISORDER: ICD-10-CM

## 2020-04-13 DIAGNOSIS — C61 PROSTATE CANCER (H): ICD-10-CM

## 2020-04-13 DIAGNOSIS — E55.9 VITAMIN D DEFICIENCY: ICD-10-CM

## 2020-04-13 DIAGNOSIS — E03.4 HYPOTHYROIDISM DUE TO ACQUIRED ATROPHY OF THYROID: ICD-10-CM

## 2020-04-13 DIAGNOSIS — F32.9 MAJOR DEPRESSION, SINGLE EPISODE: ICD-10-CM

## 2020-04-13 DIAGNOSIS — F32.5 MAJOR DEPRESSIVE DISORDER WITH SINGLE EPISODE, IN FULL REMISSION (H): ICD-10-CM

## 2020-04-13 DIAGNOSIS — L80 PRIMARY VITILIGO: ICD-10-CM

## 2020-04-15 ENCOUNTER — COMMUNICATION - HEALTHEAST (OUTPATIENT)
Dept: INTERNAL MEDICINE | Facility: CLINIC | Age: 75
End: 2020-04-15

## 2020-04-15 ENCOUNTER — AMBULATORY - HEALTHEAST (OUTPATIENT)
Dept: LAB | Facility: CLINIC | Age: 75
End: 2020-04-15

## 2020-04-15 ENCOUNTER — AMBULATORY - HEALTHEAST (OUTPATIENT)
Dept: NURSING | Facility: CLINIC | Age: 75
End: 2020-04-15

## 2020-04-15 ENCOUNTER — AMBULATORY - HEALTHEAST (OUTPATIENT)
Dept: INTERNAL MEDICINE | Facility: CLINIC | Age: 75
End: 2020-04-15

## 2020-04-15 DIAGNOSIS — E78.9 LIPID DISORDER: ICD-10-CM

## 2020-04-15 DIAGNOSIS — E03.4 HYPOTHYROIDISM DUE TO ACQUIRED ATROPHY OF THYROID: ICD-10-CM

## 2020-04-15 DIAGNOSIS — L80 PRIMARY VITILIGO: ICD-10-CM

## 2020-04-15 DIAGNOSIS — E55.9 VITAMIN D DEFICIENCY: ICD-10-CM

## 2020-04-15 LAB
ALBUMIN SERPL-MCNC: 4.3 G/DL (ref 3.5–5)
ALP SERPL-CCNC: 53 U/L (ref 45–120)
ALT SERPL W P-5'-P-CCNC: 39 U/L (ref 0–45)
ANION GAP SERPL CALCULATED.3IONS-SCNC: 8 MMOL/L (ref 5–18)
AST SERPL W P-5'-P-CCNC: 31 U/L (ref 0–40)
BILIRUB SERPL-MCNC: 0.9 MG/DL (ref 0–1)
BUN SERPL-MCNC: 13 MG/DL (ref 8–28)
CALCIUM SERPL-MCNC: 9.4 MG/DL (ref 8.5–10.5)
CHLORIDE BLD-SCNC: 102 MMOL/L (ref 98–107)
CHOLEST SERPL-MCNC: 233 MG/DL
CO2 SERPL-SCNC: 27 MMOL/L (ref 22–31)
CREAT SERPL-MCNC: 0.84 MG/DL (ref 0.7–1.3)
ERYTHROCYTE [SEDIMENTATION RATE] IN BLOOD BY WESTERGREN METHOD: 2 MM/HR (ref 0–15)
FASTING STATUS PATIENT QL REPORTED: YES
GFR SERPL CREATININE-BSD FRML MDRD: >60 ML/MIN/1.73M2
GLUCOSE BLD-MCNC: 92 MG/DL (ref 70–125)
HDLC SERPL-MCNC: 41 MG/DL
LDLC SERPL CALC-MCNC: 126 MG/DL
LDLC SERPL CALC-MCNC: ABNORMAL MG/DL
POTASSIUM BLD-SCNC: 4.6 MMOL/L (ref 3.5–5)
PROT SERPL-MCNC: 7.2 G/DL (ref 6–8)
SODIUM SERPL-SCNC: 137 MMOL/L (ref 136–145)
TRIGL SERPL-MCNC: 551 MG/DL
TSH SERPL DL<=0.005 MIU/L-ACNC: 0.23 UIU/ML (ref 0.3–5)
VIT B12 SERPL-MCNC: 482 PG/ML (ref 213–816)

## 2020-04-16 ENCOUNTER — COMMUNICATION - HEALTHEAST (OUTPATIENT)
Dept: INTERNAL MEDICINE | Facility: CLINIC | Age: 75
End: 2020-04-16

## 2020-04-17 LAB
25(OH)D3 SERPL-MCNC: 27 NG/ML (ref 30–80)
25(OH)D3 SERPL-MCNC: 27 NG/ML (ref 30–80)

## 2020-05-26 ENCOUNTER — RECORDS - HEALTHEAST (OUTPATIENT)
Dept: ADMINISTRATIVE | Facility: OTHER | Age: 75
End: 2020-05-26

## 2020-06-15 ENCOUNTER — RECORDS - HEALTHEAST (OUTPATIENT)
Dept: ADMINISTRATIVE | Facility: OTHER | Age: 75
End: 2020-06-15

## 2020-06-15 LAB
ALT SERPL W/O P-5'-P-CCNC: 33 U/L (ref 7–55)
AST SERPL-CCNC: 36 U/L (ref 8–48)

## 2020-06-29 ENCOUNTER — RECORDS - HEALTHEAST (OUTPATIENT)
Dept: ADMINISTRATIVE | Facility: OTHER | Age: 75
End: 2020-06-29

## 2020-07-02 ENCOUNTER — RECORDS - HEALTHEAST (OUTPATIENT)
Dept: HEALTH INFORMATION MANAGEMENT | Facility: CLINIC | Age: 75
End: 2020-07-02

## 2020-07-23 ENCOUNTER — COMMUNICATION - HEALTHEAST (OUTPATIENT)
Dept: OCCUPATIONAL THERAPY | Facility: REHABILITATION | Age: 75
End: 2020-07-23

## 2020-08-03 ENCOUNTER — COMMUNICATION - HEALTHEAST (OUTPATIENT)
Dept: INTERNAL MEDICINE | Facility: CLINIC | Age: 75
End: 2020-08-03

## 2020-08-03 DIAGNOSIS — L71.9 ROSACEA: ICD-10-CM

## 2020-09-25 ENCOUNTER — COMMUNICATION - HEALTHEAST (OUTPATIENT)
Dept: INTERNAL MEDICINE | Facility: CLINIC | Age: 75
End: 2020-09-25

## 2020-10-11 ENCOUNTER — COMMUNICATION - HEALTHEAST (OUTPATIENT)
Dept: INTERNAL MEDICINE | Facility: CLINIC | Age: 75
End: 2020-10-11

## 2020-10-12 ENCOUNTER — OFFICE VISIT - HEALTHEAST (OUTPATIENT)
Dept: INTERNAL MEDICINE | Facility: CLINIC | Age: 75
End: 2020-10-12

## 2020-10-12 DIAGNOSIS — C61 PROSTATE CANCER (H): ICD-10-CM

## 2020-10-12 DIAGNOSIS — E78.9 LIPID DISORDER: ICD-10-CM

## 2020-10-12 DIAGNOSIS — G62.9 NEUROPATHY: ICD-10-CM

## 2020-10-12 RX ORDER — LATANOPROST 50 UG/ML
SOLUTION/ DROPS OPHTHALMIC
Status: SHIPPED | COMMUNITY
Start: 2020-09-02 | End: 2022-05-12

## 2020-10-12 ASSESSMENT — PATIENT HEALTH QUESTIONNAIRE - PHQ9: SUM OF ALL RESPONSES TO PHQ QUESTIONS 1-9: 0

## 2020-10-13 ENCOUNTER — AMBULATORY - HEALTHEAST (OUTPATIENT)
Dept: LAB | Facility: CLINIC | Age: 75
End: 2020-10-13

## 2020-10-13 ENCOUNTER — AMBULATORY - HEALTHEAST (OUTPATIENT)
Dept: NURSING | Facility: CLINIC | Age: 75
End: 2020-10-13

## 2020-10-13 DIAGNOSIS — C61 PROSTATE CANCER (H): ICD-10-CM

## 2020-10-13 DIAGNOSIS — E78.9 LIPID DISORDER: ICD-10-CM

## 2020-10-13 LAB
CHOLEST SERPL-MCNC: 194 MG/DL
FASTING STATUS PATIENT QL REPORTED: NO
HDLC SERPL-MCNC: 38 MG/DL
LDLC SERPL CALC-MCNC: 77 MG/DL
PSA SERPL-MCNC: <0.1 NG/ML (ref 0–6.5)
TRIGL SERPL-MCNC: 397 MG/DL

## 2020-10-16 ENCOUNTER — WALK IN (OUTPATIENT)
Dept: URGENT CARE | Age: 75
End: 2020-10-16

## 2020-10-16 VITALS
WEIGHT: 200 LBS | BODY MASS INDEX: 26.51 KG/M2 | TEMPERATURE: 97.9 F | HEIGHT: 73 IN | SYSTOLIC BLOOD PRESSURE: 110 MMHG | RESPIRATION RATE: 18 BRPM | HEART RATE: 63 BPM | DIASTOLIC BLOOD PRESSURE: 74 MMHG

## 2020-10-16 DIAGNOSIS — T16.2XXA FOREIGN BODY OF LEFT EAR, INITIAL ENCOUNTER: Primary | ICD-10-CM

## 2020-10-16 PROCEDURE — 99213 OFFICE O/P EST LOW 20 MIN: CPT | Performed by: NURSE PRACTITIONER

## 2020-10-16 PROCEDURE — 69210 REMOVE IMPACTED EAR WAX UNI: CPT | Performed by: NURSE PRACTITIONER

## 2020-10-16 RX ORDER — ASPIRIN 81 MG/1
81 TABLET, CHEWABLE ORAL DAILY
COMMUNITY

## 2020-10-25 ENCOUNTER — COMMUNICATION - HEALTHEAST (OUTPATIENT)
Dept: INTERNAL MEDICINE | Facility: CLINIC | Age: 75
End: 2020-10-25

## 2020-10-30 ENCOUNTER — COMMUNICATION - HEALTHEAST (OUTPATIENT)
Dept: INTERNAL MEDICINE | Facility: CLINIC | Age: 75
End: 2020-10-30

## 2020-12-01 ENCOUNTER — COMMUNICATION - HEALTHEAST (OUTPATIENT)
Dept: INTERNAL MEDICINE | Facility: CLINIC | Age: 75
End: 2020-12-01

## 2020-12-01 DIAGNOSIS — H02.409: ICD-10-CM

## 2020-12-02 ENCOUNTER — COMMUNICATION - HEALTHEAST (OUTPATIENT)
Dept: ADMINISTRATIVE | Facility: CLINIC | Age: 75
End: 2020-12-02

## 2020-12-02 ENCOUNTER — COMMUNICATION - HEALTHEAST (OUTPATIENT)
Dept: INTERNAL MEDICINE | Facility: CLINIC | Age: 75
End: 2020-12-02

## 2020-12-29 ENCOUNTER — COMMUNICATION - HEALTHEAST (OUTPATIENT)
Dept: INTERNAL MEDICINE | Facility: CLINIC | Age: 75
End: 2020-12-29

## 2021-01-09 ENCOUNTER — COMMUNICATION - HEALTHEAST (OUTPATIENT)
Dept: INTERNAL MEDICINE | Facility: CLINIC | Age: 76
End: 2021-01-09

## 2021-01-09 DIAGNOSIS — F52.8 PSYCHOSEXUAL DYSFUNCTION WITH INHIBITED SEXUAL EXCITEMENT: ICD-10-CM

## 2021-01-10 RX ORDER — SILDENAFIL 50 MG/1
TABLET, FILM COATED ORAL
Qty: 20 TABLET | Refills: 0 | Status: SHIPPED | OUTPATIENT
Start: 2021-01-10 | End: 2021-08-23

## 2021-01-14 ENCOUNTER — RECORDS - HEALTHEAST (OUTPATIENT)
Dept: ADMINISTRATIVE | Facility: OTHER | Age: 76
End: 2021-01-14

## 2021-01-14 LAB
ALT SERPL W/O P-5'-P-CCNC: 25 U/L (ref 7–55)
AST SERPL-CCNC: 22 U/L (ref 8–48)

## 2021-01-25 ENCOUNTER — COMMUNICATION - HEALTHEAST (OUTPATIENT)
Dept: INTERNAL MEDICINE | Facility: CLINIC | Age: 76
End: 2021-01-25

## 2021-01-26 ENCOUNTER — COMMUNICATION - HEALTHEAST (OUTPATIENT)
Dept: INTERNAL MEDICINE | Facility: CLINIC | Age: 76
End: 2021-01-26

## 2021-01-26 DIAGNOSIS — M94.9 DISORDER OF BONE AND CARTILAGE: ICD-10-CM

## 2021-01-26 DIAGNOSIS — M89.9 DISORDER OF BONE AND CARTILAGE: ICD-10-CM

## 2021-01-27 RX ORDER — ALENDRONATE SODIUM 70 MG/1
TABLET ORAL
Qty: 12 TABLET | Refills: 2 | Status: SHIPPED | OUTPATIENT
Start: 2021-01-27 | End: 2021-08-23

## 2021-02-01 ENCOUNTER — RECORDS - HEALTHEAST (OUTPATIENT)
Dept: ADMINISTRATIVE | Facility: OTHER | Age: 76
End: 2021-02-01

## 2021-02-02 ENCOUNTER — RECORDS - HEALTHEAST (OUTPATIENT)
Dept: HEALTH INFORMATION MANAGEMENT | Facility: CLINIC | Age: 76
End: 2021-02-02

## 2021-02-08 ENCOUNTER — COMMUNICATION - HEALTHEAST (OUTPATIENT)
Dept: ONCOLOGY | Facility: HOSPITAL | Age: 76
End: 2021-02-08

## 2021-02-18 ENCOUNTER — COMMUNICATION - HEALTHEAST (OUTPATIENT)
Dept: INTERNAL MEDICINE | Facility: CLINIC | Age: 76
End: 2021-02-18

## 2021-04-22 ENCOUNTER — COMMUNICATION - HEALTHEAST (OUTPATIENT)
Dept: INTERNAL MEDICINE | Facility: CLINIC | Age: 76
End: 2021-04-22

## 2021-04-22 ENCOUNTER — COMMUNICATION - HEALTHEAST (OUTPATIENT)
Dept: ONCOLOGY | Facility: HOSPITAL | Age: 76
End: 2021-04-22

## 2021-05-17 ENCOUNTER — COMMUNICATION - HEALTHEAST (OUTPATIENT)
Dept: INTERNAL MEDICINE | Facility: CLINIC | Age: 76
End: 2021-05-17

## 2021-05-17 DIAGNOSIS — E03.4 HYPOTHYROIDISM DUE TO ACQUIRED ATROPHY OF THYROID: ICD-10-CM

## 2021-05-17 DIAGNOSIS — E78.9 LIPID DISORDER: ICD-10-CM

## 2021-05-18 RX ORDER — LEVOTHYROXINE SODIUM 150 UG/1
TABLET ORAL
Qty: 90 TABLET | Refills: 0 | Status: SHIPPED | OUTPATIENT
Start: 2021-05-18 | End: 2021-08-19

## 2021-05-18 RX ORDER — ROSUVASTATIN CALCIUM 10 MG/1
TABLET, COATED ORAL
Qty: 90 TABLET | Refills: 1 | Status: SHIPPED | OUTPATIENT
Start: 2021-05-18 | End: 2021-08-23

## 2021-05-27 ASSESSMENT — PATIENT HEALTH QUESTIONNAIRE - PHQ9: SUM OF ALL RESPONSES TO PHQ QUESTIONS 1-9: 0

## 2021-05-27 NOTE — TELEPHONE ENCOUNTER
Spoke with pt and advised to go to an ER or Urgency room to get his ankle checked out. Pt declined, stating he doesn't think it's bad enough to have a physician look at it.  Pt states his leg is changing color, is now a deeper red, leading form the wound up the leg.  CA advised pt of concerns of possible blood clot, infection or broken bone.  Pt understanding, but declined advise of getting ankle/foot checked out by a physician in [place].  Pt would like PCP to send in an oral abx for him to take.

## 2021-05-27 NOTE — PATIENT INSTRUCTIONS - HE
1. Check with pharmacy to receive Shingrix vaccine    Tetanus is up to date    See wound clinic     Next bone density due 1-2 years after starting Alendronate June of 2018, so June of 2019 thru may of 2020    Ask [place] about testosterone supplements and triggering or stimulating prostate cancer???  Is it true???

## 2021-05-27 NOTE — TELEPHONE ENCOUNTER
Medication Request  Medication name: Antibiotics  Pharmacy Name and Location: [facility]  Reason for request: Patient stated that he ripped open the skin on his left ankle, down to the bone about 3 inches in size. Patient stated that he put a band aid on it, with antibiotic creams, and ibuprofen for the pain. Patient stated that he does not think that stiches would work. Patient stated that he uploaded a picture of the wound in the Mychart encounter below.  When did you use medication last?:  n/a  Patient offered appointment:  patient declined  Okay to leave a detailed message: yes
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
## 2021-05-29 NOTE — TELEPHONE ENCOUNTER
Who is calling:  Partient  Reason for Call:  Requesting a return phone call from provider to discuss the imaging he had completed at the [facility] [place] [facility]. Please refer to scanned document dated 05/17/2019.  Date of last appointment with primary care: 04/10/2019  Okay to leave a detailed message: Yes

## 2021-05-30 ENCOUNTER — RECORDS - HEALTHEAST (OUTPATIENT)
Dept: ADMINISTRATIVE | Facility: CLINIC | Age: 76
End: 2021-05-30

## 2021-05-30 VITALS — WEIGHT: 203.2 LBS | BODY MASS INDEX: 27.56 KG/M2

## 2021-05-31 VITALS — BODY MASS INDEX: 27.5 KG/M2 | WEIGHT: 203 LBS | HEIGHT: 72 IN

## 2021-05-31 VITALS — BODY MASS INDEX: 27.64 KG/M2 | WEIGHT: 203.8 LBS

## 2021-05-31 VITALS — BODY MASS INDEX: 27.81 KG/M2 | WEIGHT: 205.3 LBS | HEIGHT: 72 IN

## 2021-06-01 VITALS — WEIGHT: 205.9 LBS | HEIGHT: 72 IN | BODY MASS INDEX: 27.89 KG/M2

## 2021-06-02 VITALS — HEIGHT: 72 IN | WEIGHT: 211.44 LBS | BODY MASS INDEX: 28.64 KG/M2

## 2021-06-02 VITALS — BODY MASS INDEX: 27.8 KG/M2 | WEIGHT: 205 LBS

## 2021-06-03 ENCOUNTER — RECORDS - HEALTHEAST (OUTPATIENT)
Dept: ADMINISTRATIVE | Facility: CLINIC | Age: 76
End: 2021-06-03

## 2021-06-03 VITALS — BODY MASS INDEX: 27.71 KG/M2 | WEIGHT: 204.6 LBS | HEIGHT: 72 IN

## 2021-06-03 NOTE — TELEPHONE ENCOUNTER
RN cannot approve Refill Request    RN can NOT refill this medication Allergy/contraindication. Last office visit: 4/10/2019 Jalil Manjarrze MD Last Physical: 5/16/2018 Last MTM visit: Visit date not found Last visit same specialty: 4/10/2019 Jalil Manjarrez MD.  Next visit within 3 mo: Visit date not found  Next physical within 3 mo: Visit date not found      Shivani Crouch, [facility] Connection Triage/Med Refill 11/27/2019    Requested Prescriptions   Pending Prescriptions Disp Refills     rosuvastatin (CRESTOR) 5 MG tablet [Pharmacy Med Name: Rosuvastatin Calcium Oral Tablet 5 MG] 90 tablet 0     Sig: TAKE ONE TABLET BY MOUTH DAILY       Statins Refill Protocol (Hmg CoA Reductase Inhibitors) Passed - 11/25/2019 11:18 AM        Passed - PCP or prescribing provider visit in past 12 months      Last office visit with prescriber/PCP: 4/10/2019 Jalil Manjarrez MD OR same dept: 4/10/2019 Jalil Manjarrez MD OR same specialty: 4/10/2019 Jalil Manjarrez MD  Last physical: 5/16/2018 Last MTM visit: Visit date not found   Next visit within 3 mo: Visit date not found  Next physical within 3 mo: Visit date not found  Prescriber OR PCP: Jalil Manjarrez MD  Last diagnosis associated with med order: 1. Lipid disorder  - rosuvastatin (CRESTOR) 5 MG tablet [Pharmacy Med Name: Rosuvastatin Calcium Oral Tablet 5 MG]; TAKE ONE TABLET BY MOUTH DAILY   Dispense: 90 tablet; Refill: 0    2. Major depression, single episode  - escitalopram oxalate (LEXAPRO) 10 MG tablet [Pharmacy Med Name: Escitalopram Oxalate Oral Tablet 10 MG]; TAKE ONE TABLET BY MOUTH ONE TIME DAILY   Dispense: 90 tablet; Refill: 0    If protocol passes may refill for 12 months if within 3 months of last provider visit (or a total of 15 months).             escitalopram oxalate (LEXAPRO) 10 MG tablet [Pharmacy Med Name: Escitalopram Oxalate Oral Tablet 10 MG] 90 tablet 0     Sig: TAKE ONE TABLET BY MOUTH ONE TIME DAILY       [facility]RI Refill Protocol   Passed - 11/25/2019 11:18 AM        Passed - PCP or prescribing provider visit in last year     Last office visit with prescriber/PCP: 4/10/2019 Jalil Manjarrez MD OR same dept: 4/10/2019 Jalil Manjarrez MD OR same specialty: 4/10/2019 Jalil Manjarrez MD  Last physical: 5/16/2018 Last MTM visit: Visit date not found   Next visit within 3 mo: Visit date not found  Next physical within 3 mo: Visit date not found  Prescriber OR PCP: Jalil Manjarrez MD  Last diagnosis associated with med order: 1. Lipid disorder  - rosuvastatin (CRESTOR) 5 MG tablet [Pharmacy Med Name: Rosuvastatin Calcium Oral Tablet 5 MG]; TAKE ONE TABLET BY MOUTH DAILY   Dispense: 90 tablet; Refill: 0    2. Major depression, single episode  - escitalopram oxalate (LEXAPRO) 10 MG tablet [Pharmacy Med Name: Escitalopram Oxalate Oral Tablet 10 MG]; TAKE ONE TABLET BY MOUTH ONE TIME DAILY   Dispense: 90 tablet; Refill: 0    If protocol passes may refill for 12 months if within 3 months of last provider visit (or a total of 15 months).

## 2021-06-05 NOTE — TELEPHONE ENCOUNTER
RN cannot approve Refill Request    RN can NOT refill this medication Protocol failed and NO refill given.      Vesna Sims, [facility] Triage/Med Refill 1/18/2020    Requested Prescriptions   Pending Prescriptions Disp Refills     alendronate (FOSAMAX) 70 MG tablet [Pharmacy Med Name: Alendronate Sodium Oral Tablet 70 MG] 12 tablet 2     Sig: TAKE 1 TABLET BY MOUTH ONCE WEEKLY       Biphosphonates Refill Protocol Failed - 1/17/2020  2:23 PM        Failed - Serum creatinine in last 12 months     Creatinine   Date Value Ref Range Status   10/17/2018 0.72 0.70 - 1.30 mg/dL Final             Passed - PCP or prescribing provider visit in last 12 months     Last office visit with prescriber/PCP: 4/10/2019 Jalil Manjarrez MD OR same dept: 4/10/2019 Jalil Manjarrez MD OR same specialty: 4/10/2019 Jalil Manjarrez MD  Last physical: 5/16/2018 Last MTM visit: Visit date not found   Next visit within 3 mo: Visit date not found  Next physical within 3 mo: Visit date not found  Prescriber OR PCP: Jalil Manjarrez MD  Last diagnosis associated with med order: 1. Osteopenia  - alendronate (FOSAMAX) 70 MG tablet [Pharmacy Med Name: Alendronate Sodium Oral Tablet 70 MG]; TAKE 1 TABLET BY MOUTH ONCE WEEKLY  Dispense: 12 tablet; Refill: 2    If protocol passes may refill for 12 months if within 3 months of last provider visit (or a total of 15 months).

## 2021-06-05 NOTE — TELEPHONE ENCOUNTER
Medication Request  Medication name:    Disp Refills Start End    alendronate (FOSAMAX) 70 MG tablet 12 tablet 3 4/10/2019     Sig - Route: Take 1 tablet (70 mg total) by mouth every 7 days. - Oral    Sent to pharmacy as: alendronate (FOSAMAX) 70 MG tablet    E-Prescribing Status: Receipt confirmed by pharmacy (4/10/2019 11:36 AM CDT)        Requested Pharmacy: [id number]  Reason for request: Patient forgot his pills at home and need to take  Today. Needs prescription sent ASAP today please.    When did you use medication last?:  Last week  Patient offered appointment:  No  Okay to leave a detailed message: yes

## 2021-06-06 NOTE — TELEPHONE ENCOUNTER
Refill Approved    Rx renewed per Medication Renewal Policy. Medication was last renewed on 11/29/19.    Vesna Sims, [facility] Connection Triage/Med Refill 2/27/2020     Requested Prescriptions   Pending Prescriptions Disp Refills     levothyroxine (SYNTHROID, LEVOTHROID) 150 MCG tablet [Pharmacy Med Name: Levothyroxine Sodium Oral Tablet 150 MCG] 90 tablet 1     Sig: TAKE ONE TABLET BY MOUTH DAILY       Thyroid Hormones Protocol Failed - 2/27/2020  9:53 AM        Failed - TSH on file in past 12 months for patient age 12 & older     TSH   Date Value Ref Range Status   10/17/2018 0.46 0.30 - 5.00 uIU/mL Final                   Passed - Provider visit in past 12 months or next 3 months     Last office visit with prescriber/PCP: 4/10/2019 Jalil Manjarrez MD OR same dept: Visit date not found OR same specialty: Visit date not found  Last physical: 5/16/2018 Last MTM visit: Visit date not found   Next visit within 3 mo: Visit date not found  Next physical within 3 mo: Visit date not found  Prescriber OR PCP: Jalil Manjarrez MD  Last diagnosis associated with med order: 1. Hypothyroidism due to acquired atrophy of thyroid  - levothyroxine (SYNTHROID, LEVOTHROID) 150 MCG tablet [Pharmacy Med Name: Levothyroxine Sodium Oral Tablet 150 MCG]; TAKE ONE TABLET BY MOUTH DAILY  Dispense: 90 tablet; Refill: 1    2. Lipid disorder  - rosuvastatin (CRESTOR) 5 MG tablet [Pharmacy Med Name: Rosuvastatin Calcium Oral Tablet 5 MG]; TAKE ONE TABLET BY MOUTH DAILY   Dispense: 90 tablet; Refill: 0    If protocol passes may refill for 12 months if within 3 months of last provider visit (or a total of 15 months).             rosuvastatin (CRESTOR) 5 MG tablet [Pharmacy Med Name: Rosuvastatin Calcium Oral Tablet 5 MG] 90 tablet 0     Sig: TAKE ONE TABLET BY MOUTH DAILY       Statins Refill Protocol (Hmg CoA Reductase Inhibitors) Passed - 2/27/2020  9:53 AM        Passed - PCP or prescribing provider visit in past 12 months      Last  office visit with prescriber/PCP: 4/10/2019 Jalil Manjarrez MD OR same dept: Visit date not found OR same specialty: Visit date not found  Last physical: 5/16/2018 Last MTM visit: Visit date not found   Next visit within 3 mo: Visit date not found  Next physical within 3 mo: Visit date not found  Prescriber OR PCP: Jalil Manjarrez MD  Last diagnosis associated with med order: 1. Hypothyroidism due to acquired atrophy of thyroid  - levothyroxine (SYNTHROID, LEVOTHROID) 150 MCG tablet [Pharmacy Med Name: Levothyroxine Sodium Oral Tablet 150 MCG]; TAKE ONE TABLET BY MOUTH DAILY  Dispense: 90 tablet; Refill: 1    2. Lipid disorder  - rosuvastatin (CRESTOR) 5 MG tablet [Pharmacy Med Name: Rosuvastatin Calcium Oral Tablet 5 MG]; TAKE ONE TABLET BY MOUTH DAILY   Dispense: 90 tablet; Refill: 0    If protocol passes may refill for 12 months if within 3 months of last provider visit (or a total of 15 months).             escitalopram oxalate (LEXAPRO) 10 MG tablet [Pharmacy Med Name: Escitalopram Oxalate Oral Tablet 10 MG] 90 tablet 0     Sig: TAKE ONE TABLET BY MOUTH ONE TIME DAILY       SSRI Refill Protocol  Passed - 2/27/2020  9:53 AM        Passed - PCP or prescribing provider visit in last year     Last office visit with prescriber/PCP: 4/10/2019 Jalil Manjarrez MD OR same dept: Visit date not found OR same specialty: Visit date not found  Last physical: 5/16/2018 Last MTM visit: Visit date not found   Next visit within 3 mo: Visit date not found  Next physical within 3 mo: Visit date not found  Prescriber OR PCP: Jalil Manjarrez MD  Last diagnosis associated with med order: 1. Hypothyroidism due to acquired atrophy of thyroid  - levothyroxine (SYNTHROID, LEVOTHROID) 150 MCG tablet [Pharmacy Med Name: Levothyroxine Sodium Oral Tablet 150 MCG]; TAKE ONE TABLET BY MOUTH DAILY  Dispense: 90 tablet; Refill: 1    2. Lipid disorder  - rosuvastatin (CRESTOR) 5 MG tablet [Pharmacy Med Name: Rosuvastatin Calcium  Oral Tablet 5 MG]; TAKE ONE TABLET BY MOUTH DAILY   Dispense: 90 tablet; Refill: 0    If protocol passes may refill for 12 months if within 3 months of last provider visit (or a total of 15 months).

## 2021-06-06 NOTE — TELEPHONE ENCOUNTER
RN cannot approve Refill Request    RN can NOT refill this medication Protocol failed and NO refill given.      Vesna Sims, [facility] Triage/Med Refill 2/27/2020    Requested Prescriptions   Pending Prescriptions Disp Refills     levothyroxine (SYNTHROID, LEVOTHROID) 150 MCG tablet [Pharmacy Med Name: Levothyroxine Sodium Oral Tablet 150 MCG] 90 tablet 1     Sig: TAKE ONE TABLET BY MOUTH DAILY       Thyroid Hormones Protocol Failed - 2/27/2020  9:53 AM        Failed - TSH on file in past 12 months for patient age 12 & older     TSH   Date Value Ref Range Status   10/17/2018 0.46 0.30 - 5.00 uIU/mL Final                   Passed - Provider visit in past 12 months or next 3 months     Last office visit with prescriber/PCP: 4/10/2019 Jalil Manjarrez MD OR same dept: Visit date not found OR same specialty: Visit date not found  Last physical: 5/16/2018 Last MTM visit: Visit date not found   Next visit within 3 mo: Visit date not found  Next physical within 3 mo: Visit date not found  Prescriber OR PCP: Jalil Manjarrez MD  Last diagnosis associated with med order: 1. Hypothyroidism due to acquired atrophy of thyroid  - levothyroxine (SYNTHROID, LEVOTHROID) 150 MCG tablet [Pharmacy Med Name: Levothyroxine Sodium Oral Tablet 150 MCG]; TAKE ONE TABLET BY MOUTH DAILY  Dispense: 90 tablet; Refill: 1    2. Lipid disorder  - rosuvastatin (CRESTOR) 5 MG tablet; TAKE ONE TABLET BY MOUTH DAILY  Dispense: 90 tablet; Refill: 0    3. Major depressive disorder with single episode, in full remission (H)  - escitalopram oxalate (LEXAPRO) 10 MG tablet; TAKE ONE TABLET BY MOUTH ONE TIME DAILY  Dispense: 90 tablet; Refill: 0    If protocol passes may refill for 12 months if within 3 months of last provider visit (or a total of 15 months).           Signed Prescriptions Disp Refills    rosuvastatin (CRESTOR) 5 MG tablet 90 tablet 0     Sig: TAKE ONE TABLET BY MOUTH DAILY       Statins Refill Protocol (Hmg CoA Reductase  Inhibitors) Passed - 2/27/2020  9:53 AM        Passed - PCP or prescribing provider visit in past 12 months      Last office visit with prescriber/PCP: 4/10/2019 Jalil Manjarrez MD OR same dept: Visit date not found OR same specialty: Visit date not found  Last physical: 5/16/2018 Last MTM visit: Visit date not found   Next visit within 3 mo: Visit date not found  Next physical within 3 mo: Visit date not found  Prescriber OR PCP: Jalil Manjarrez MD  Last diagnosis associated with med order: 1. Hypothyroidism due to acquired atrophy of thyroid  - levothyroxine (SYNTHROID, LEVOTHROID) 150 MCG tablet [Pharmacy Med Name: Levothyroxine Sodium Oral Tablet 150 MCG]; TAKE ONE TABLET BY MOUTH DAILY  Dispense: 90 tablet; Refill: 1    2. Lipid disorder  - rosuvastatin (CRESTOR) 5 MG tablet; TAKE ONE TABLET BY MOUTH DAILY  Dispense: 90 tablet; Refill: 0    3. Major depressive disorder with single episode, in full remission (H)  - escitalopram oxalate (LEXAPRO) 10 MG tablet; TAKE ONE TABLET BY MOUTH ONE TIME DAILY  Dispense: 90 tablet; Refill: 0    If protocol passes may refill for 12 months if within 3 months of last provider visit (or a total of 15 months).            escitalopram oxalate (LEXAPRO) 10 MG tablet 90 tablet 0     Sig: TAKE ONE TABLET BY MOUTH ONE TIME DAILY       [facility] Refill Protocol  Passed - 2/27/2020  9:53 AM        Passed - PCP or prescribing provider visit in last year     Last office visit with prescriber/PCP: 4/10/2019 Jalil Manjarrez MD OR same dept: Visit date not found OR same specialty: Visit date not found  Last physical: 5/16/2018 Last MTM visit: Visit date not found   Next visit within 3 mo: Visit date not found  Next physical within 3 mo: Visit date not found  Prescriber OR PCP: Jalil Manjarrez MD  Last diagnosis associated with med order: 1. Hypothyroidism due to acquired atrophy of thyroid  - levothyroxine (SYNTHROID, LEVOTHROID) 150 MCG tablet [Pharmacy Med Name: Levothyroxine  Sodium Oral Tablet 150 MCG]; TAKE ONE TABLET BY MOUTH DAILY  Dispense: 90 tablet; Refill: 1    2. Lipid disorder  - rosuvastatin (CRESTOR) 5 MG tablet; TAKE ONE TABLET BY MOUTH DAILY  Dispense: 90 tablet; Refill: 0    3. Major depressive disorder with single episode, in full remission (H)  - escitalopram oxalate (LEXAPRO) 10 MG tablet; TAKE ONE TABLET BY MOUTH ONE TIME DAILY  Dispense: 90 tablet; Refill: 0    If protocol passes may refill for 12 months if within 3 months of last provider visit (or a total of 15 months).

## 2021-06-07 NOTE — PATIENT INSTRUCTIONS - HE
Labs ordered    Update Pneumovax 23    Update vitamin B12 shot for neuropathy    Trial of Protopic 0.1%.    If no improvement referral to dermatology

## 2021-06-07 NOTE — PROGRESS NOTES
ASSESSMENT:  1. Primary vitiligo  Previous history however I cannot find dermatology documentation.  Typically indicative of inflammatory skin disorder.  Recommend updated labs.  He reports family history of connective tissue diseases also.  Begin Protopic.  If no improvement referral back to dermatology  - Comprehensive Metabolic Panel; Future  - Vitamin B12; Future  - Erythrocyte Sedimentation Rate; Future  - tacrolimus (PROTOPIC) 0.1 % ointment; Apply to pale areas of skin two times a day  Dispense: 100 g; Refill: 3    2. Vitamin D deficiency  Check labs on supplements  - Vitamin D, Total (25-Hydroxy); Future    3. Major depressive disorder with single episode, in full remission (H)  Mood stable.  Refill citalopram    4. Neuropathy  He wishes to resume B12 shots and is due.  Order updated  - cyanocobalamin injection 1,000 mcg  - escitalopram oxalate (LEXAPRO) 10 MG tablet; Take 1 tablet (10 mg total) by mouth daily.  Dispense: 90 tablet; Refill: 3    5. Prostate cancer (H)  Reviewed evaluation through mail.  Reviewed labs done in November.  Currently stable    6. Hypothyroidism due to acquired atrophy of thyroid  Due for labs and refill.  Symptomatically good  - Thyroid Stimulating Hormone (TSH); Future  - levothyroxine (SYNTHROID, LEVOTHROID) 150 MCG tablet; Take 1 tablet (150 mcg total) by mouth daily.  Dispense: 90 tablet; Refill: 3    7. Lipid disorder  Due for labs with adjustment in medication  - Lipid Cascade; Future  - rosuvastatin (CRESTOR) 5 MG tablet; Take 1 tablet (5 mg total) by mouth daily.  Dispense: 90 tablet; Refill: 3    8. Major depression, single episode  Stable  - escitalopram oxalate (LEXAPRO) 10 MG tablet; Take 1 tablet (10 mg total) by mouth daily.  Dispense: 90 tablet; Refill: 3      Preventive Health Care: Due for Pneumovax.  Otherwise up-to-date    PLAN:  There are no Patient Instructions on file for this visit.    Orders Placed This Encounter   Procedures     Pneumococcal  polysaccharide vaccine 23-valent greater than or equal to 1yo subcutaneous/IM     Standing Status:   Future     Standing Expiration Date:   4/13/2021     Comprehensive Metabolic Panel     Standing Status:   Future     Standing Expiration Date:   4/13/2021     Lipid Cascade     Standing Status:   Future     Standing Expiration Date:   4/13/2021     Order Specific Question:   Fasting is required?     Answer:   Unknown     Vitamin B12     Standing Status:   Future     Standing Expiration Date:   4/13/2021     Vitamin D, Total (25-Hydroxy)     Standing Status:   Future     Standing Expiration Date:   4/13/2021     Thyroid Stimulating Hormone (TSH)     Standing Status:   Future     Standing Expiration Date:   4/13/2021     Erythrocyte Sedimentation Rate     Standing Status:   Future     Standing Expiration Date:   4/13/2021     Medications Discontinued During This Encounter   Medication Reason     alendronate (FOSAMAX) 70 MG tablet Duplicate order     collagenase ointment Therapy completed     escitalopram oxalate (LEXAPRO) 10 MG tablet Duplicate order     escitalopram oxalate (LEXAPRO) 10 MG tablet Duplicate order     OMEGA-3/DHA/EPA/FISH OIL (FISH OIL-OMEGA-3 FATTY ACIDS) 300-1,000 mg capsule Therapy completed     sildenafil (VIAGRA) 50 MG tablet Therapy completed     tadalafil (CIALIS) 20 MG tablet Therapy completed     cyanocobalamin injection 1,000 mcg      escitalopram oxalate (LEXAPRO) 10 MG tablet Reorder     rosuvastatin (CRESTOR) 5 MG tablet Reorder     levothyroxine (SYNTHROID, LEVOTHROID) 150 MCG tablet Reorder     No follow-ups on file.    CHIEF COMPLAINT:  Chief Complaint   Patient presents with     Skin Problem     Losing pigmentation in hands and arms       HISTORY OF PRESENT ILLNESS:  Justin is a 74 y.o. male calling in to the clinic today to report vitiligo.  He reports that discoloration began on his hands and has been slowly moving up his arms.  This is painful when exposed to sun.  He reports that  something similar happened many years ago for which she saw the dermatologist but he cannot recall who or when    He is otherwise feeling well.  Energy is good on current dose of thyroid    REVIEW OF SYSTEMS:   No chest pain or shortness of breath.  No fever or chills.  Good energy.  All other systems are negative.    PFSH:  Family members with multiple sclerosis, rheumatoid arthritis, and type 1 diabetes    TOBACCO USE:  Social History     Tobacco Use   Smoking Status Never Smoker   Smokeless Tobacco Never Used   Tobacco Comment    in college social smoker       VITALS:  Stated Blood Pressure     Stated Pulse     Stated Weight      PHYSICAL EXAM:  (observations via phone)  Alert and happy.  No shortness of breath or cough      ADDITIONAL HISTORY SUMMARIZED (2): Reviewed old dermatology notes in archives and media  CARE EVERYWHERE/ EXTRA INFORMATION (1): Reviewed [facility] labs and testing done in November  RADIOLOGY TESTS (1):   LABS (1): Cholesterol done in November.  Thyroid due  CARDIOLOGY/MEDICINE TESTS (1):   INDEPENDENT REVIEW (2 each):     Total data points:4    The visit lasted a total of 20 minutes   CA intake time  4:30 minutes    Telephone Consent was completed by  staff and confirmed by MB  I have reviewed and updated the patient's Past Medical History, Social History, Family History as appropriate.    MEDICATIONS: Reviewed and updated per CA and MD  Current Outpatient Medications   Medication Sig Dispense Refill     alendronate (FOSAMAX) 70 MG tablet TAKE 1 TABLET BY MOUTH ONCE WEEKLY 12 tablet 2     aspirin 81 MG EC tablet Take 81 mg by mouth daily.       escitalopram oxalate (LEXAPRO) 10 MG tablet Take 1 tablet (10 mg total) by mouth daily. 90 tablet 3     levothyroxine (SYNTHROID, LEVOTHROID) 150 MCG tablet Take 1 tablet (150 mcg total) by mouth daily. 90 tablet 3     rosuvastatin (CRESTOR) 5 MG tablet Take 1 tablet (5 mg total) by mouth daily. 90 tablet 3     tacrolimus (PROTOPIC) 0.1 %  "ointment Apply to pale areas of skin two times a day 100 g 3     Current Facility-Administered Medications   Medication Dose Route Frequency Provider Last Rate Last Dose     [START ON 5/9/2020] cyanocobalamin injection 1,000 mcg  1,000 mcg Intramuscular Q30 Days Jalil Manjarrez MD               The patient has been notified of following:     \"This telephone visit will be conducted via a call between you and your physician/provider. We have found that certain health care needs can be provided without the need for a physical exam.  This service lets us provide the care you need with a short phone conversation.  If a prescription is necessary we can send it directly to your pharmacy.  If lab work is needed we can place an order for that and you can then stop by our lab to have the test done at a later time.    If during the course of the call the physician/provider feels a telephone visit is not appropriate, you will not be charged for this service.\"   "

## 2021-06-07 NOTE — TELEPHONE ENCOUNTER
Medication Question or Clarification  Who is calling: patient   What medication are you calling about (include dose and sig)?:    Disp  Refills  Start  End     tacrolimus (PROTOPIC) 0.1 % ointment  100 g  3  4/13/2020      Sig: Apply to pale areas of skin two times a day     Sent to pharmacy as: tacrolimus 0.1 % topical ointment (PROTOPIC)     E-Prescribing Status: Receipt confirmed by pharmacy (4/13/2020  3:35 PM CDT)         Who prescribed the medication?: Jalil Manjarrez MD   What is your question/concern?: caller is needing for Jalil Manjarrez MD nurse to call him as soon as possible due to medication is very expensive and would like to know if there's any other.   Requested Pharmacy: [facility]  Okay to leave a detailed message?: Yes

## 2021-06-08 NOTE — PROGRESS NOTES
Patient is concerned about muscle soreness and wants to make sure he is OK.  He will be in town and would like to see Dr. Sahni.  Appointment was made for 2/27/17 when he is in town.

## 2021-06-09 NOTE — PROGRESS NOTES
[facility] Cancer Care Progress Note    Patient: Justin Pires  MRN: [id number]  Date of Service: 2/27/2017        Reason for visit      1. Prostate cancer    2. Diarrhea due to drug    3. Musculoskeletal symptom        Assessment     1.  Metastatic prostate cancer with involvement of the axillary lymph node, bones and intra-abdominal lymph nodes. He responded very well to treatment. PSA is undetectable.  2.  Significant improvement in his PSA after Lupron injection.  3.  He is having diarrhea with some urgency.  4.  Musculoskeletal stiffness.     Plan     1.  He will continue on prednisone 5 mg twice a day.  2.  Discussed with him about management of diarrhea. He is going to go lactose free for couple of days. If not any better, go gluten free. If that doesn't help then he needs scoped.  3.  Continue with good diet and exercise.  4.  Continue Lupron indefinitely.  5.  PSA today.    Clinical stage      Prostate cancer    Primary site: Prostate    Clinical: Stage II (T2a, N0, M0) - Signed by Turner Sahni MD on 5/11/2016    Pathologic: Stage II (T2b, N0, M0) - Signed by Turner Sahni MD on 5/11/2016    Summary: Stage II (T2b, N0, M0)      History     Justin Pires is a very pleasant 71 y.o. old male with a history of elevated PSA. He is actually prior history of prostate cancer diagnosed in 1992 when he presented to [facility] for a executive physical and was found to have elevated PSA. He was completely is symptomatically that point. His workup then revealed that he indeed had prostate cancer. At that point at 47 years of age he underwent retropubic prostatectomy which revealed prostate cancer involving the left lobe of the prostate grade 2 out of 3 with negative margins and negative lymph nodes. He had been in remission ever since. In January 2016 he had a PSA checked on which actually came back at 10.7. It was thought at the time that he might have prostatitis. He was given some antibiotics. He then went down  to [place]. There he had his PSA checked and was down to 6. He came back in April and had a repeat PSA checked which was elevated again at 15.7. Therefore Dr. Manjarrez asked him to come and see me.  He is also been seen by his urologist Dr. Fortunato Collazo. He had MRI CT scan and bone scan done. None of them are revealing any lesion.    He had a ProstaScint scan which actually showed some uptake on the right prostatic fossa.  I sent him to Dr. Plata's for another ultrasound-guided biopsy of his right prostate bed.  The biopsy was negative for any detectable prostate cancer.    He then was seen at the [facility] and got a C11 PET-choline image that showed metastatic disease with bone mets and lymph node mets. He had an axillary lymph node biopsied that was consistent with prostate cancer. He has been started on Lupron in June 2016 and in July 26th started on Taxotere. Had his first cycle end of July. Tolerated it with expected side effects. His Psa basically became undetectable very quickly. He has finished 6 cycles. Last one in November 2016.    Had C11 PET scan at [facility] that showed near complete response. He is complaining of diarrhea that is slowly getting better.    Past Medical History     Past Medical History:   Diagnosis Date     Gout      Hyperlipidemia      Hypothyroidism      Prostate cancer        Review of Systems   Constitutional  Constitutional (WDL): Exceptions to WDL  Neurosensory  Neurosensory (WDL): All neurosensory elements are within defined limits  Cardiovascular  Cardiovascular (WDL): All cardiovascular elements are within defined limits  Pulmonary  Respiratory (WDL): Exceptions to WDL  Gastrointestinal  Gastrointestinal (WDL): Exceptions to WDL  Diarrhea: Increase of <4 stools per day over baseline, mild increase in ostomy output compared to baseline  Genitourinary  Genitourinary (WDL): All genitourinary elements are within defined limits  Integumentary  Integumentary (WDL): Exceptions to WDL  Rash  Maculo-Papular: Macules/papules covering <10% BSA with or without symptoms (e.g., pruritus, burning, tightness) (rt lower leg)  Pruritus: Mild or localized, topical intervention indicated  Patient Coping  Patient Coping: Accepting  Accompanied by  Accompanied by: Alone    ECOG performance status and Distress Assessment      ECOG Performance:    ECOG Performance Status: 0    Distress Assessment  Distress Assessment Score: No distress:     Pain Status  Currently in Pain: No/denies        Vital Signs     Vitals:    02/27/17 0944   BP: 138/80   Pulse: 74   Temp: 97.5  F (36.4  C)   SpO2: 98%       Physical Exam     GENERAL: no acute distress. Cooperative in conversation.   HEENT: Chemotherapy associated alopecia. Pupils are equal, round and reactive. Oral mucosa is moist and intact.  RESP:Chest symmetric. Good air entry b/l. Regular respiratory rate. No stridor.  CVS: normal rate, regular rhythm, normal S1, S2, no murmurs, rubs, clicks or gallops.  ABD: Nondistended, soft. Non tender, no HSM. Bowel sounds normal.  EXTREMITIES: No lower extremity edema.   NEURO: non focal. Alert and oriented x3.   PSYCH: within normal limits. No depression or anxiety.  SKIN: warm dry intact.  He has slight rash on his right ankle.      Lab Results     Results for orders placed or performed in visit on 02/27/17   Comprehensive Metabolic Panel   Result Value Ref Range    Sodium 143 136 - 145 mmol/L    Potassium 3.8 3.5 - 5.0 mmol/L    Chloride 106 98 - 107 mmol/L    CO2 29 22 - 31 mmol/L    Anion Gap, Calculation 8 5 - 18 mmol/L    Glucose 75 70 - 125 mg/dL    BUN 12 8 - 28 mg/dL    Creatinine 0.74 0.70 - 1.30 mg/dL    GFR MDRD Af Amer >60 >60 mL/min/1.73m2    GFR MDRD Non Af Amer >60 >60 mL/min/1.73m2    Bilirubin, Total 0.4 0.0 - 1.0 mg/dL    Calcium 9.3 8.5 - 10.5 mg/dL    Protein, Total 6.8 6.0 - 8.0 g/dL    Albumin 3.6 3.5 - 5.0 g/dL    Alkaline Phosphatase 77 45 - 120 U/L    AST 22 0 - 40 U/L    ALT 20 0 - 45 U/L   CK Total   Result  Value Ref Range    CK, Total 104 30 - 190 U/L         Imaging Results     No results found.      Total time spent was 40 minutes, more than half of it was in face-to-face counseling regarding disease state, treatment, side effects and management.     Turner Sahni MD

## 2021-06-10 NOTE — PROGRESS NOTES
ASSESSMENT:  1. Annual physical exam  Update labs not performed during treatment of cancer  - Lipid Cascade  - Vitamin D, Total (25-Hydroxy)    2. Diarrhea, unspecified type  Check labs.  Rule out gluten.  Reviewed previous colonoscopy last done 2011.  Pathology not clear but he may be due simply for polyps.    Strongly consider collagenous colitis.  Prednisone has helped  - HM2(CBC w/o Differential)  - Thyroid Stimulating Hormone (TSH)  - Tissue Transglutaminase,IgA & IgG  - Ambulatory referral for Colonoscopy    3. Subclinical iodine-deficiency hypothyroidism  Recheck.  Trial of lower dose.  Over replacement could be contributing  - levothyroxine (SYNTHROID, LEVOTHROID) 50 MCG tablet; TAKE ONE TABLET EVERY DAY  Dispense: 90 tablet; Refill: 3    4. Diarrhea  Rule out infectious causes  - C. difficile Toxigenic by PCR  - Ova and Parasite, Stool      5.  Prostate cancer.  Doing well.  Receiving Lupron every 3 months after chemo.  Most recent scan at [place] showed remission    Abnormal pancreas tail could suggest malabsorption as cause of diarrhea    PLAN:  Patient Instructions   Further evaluation for diarrhea:    Colonoscopy  Stool tests for culture  Some blood tests     Update blood work    Stool collection kits for infection    Proceed with colonoscopy for both old polyps and new diarrhea    Inflammatory bowel disease  Collagenous colitis,   Lymphocytic colitis      Try cutting the thyroid dose in half.  Let me know how that feels, and repeat the TSH blood test in one month      Orders Placed This Encounter   Procedures     Lipid Cascade     Order Specific Question:   Fasting is required?     Answer:   Unknown     HM2(CBC w/o Differential)     Thyroid Stimulating Hormone (TSH)     Vitamin D, Total (25-Hydroxy)     Tissue Transglutaminase,IgA & IgG     Ambulatory referral for Colonoscopy     Referral Priority:   Routine     Referral Type:   Colonoscopy     Referral Reason:   Evaluation and Treatment     Requested  Specialty:   Gastroenterology     Number of Visits Requested:   1     Medications Discontinued During This Encounter   Medication Reason     levothyroxine (SYNTHROID, LEVOTHROID) 100 MCG tablet Reorder       No Follow-up on file.    CHIEF COMPLAINT:  Chief Complaint   Patient presents with     Annual Exam     Diarrhea       HISTORY OF PRESENT ILLNESS:  Justin is a 71 y.o. male presenting to the clinic today for his annual exam.    Diarrhea: He states that discontinuing milk has been very helpful and he has noticed a decrease in the frequency of diarrhea. He does admit that he puts a little half-in-half in coffee. He reports that he has basically discontinued dairy all in general. He reports his diarrhea is well controlled with an OTC anti-diarrheal. He states he has been experiencing diarrhea since his chemo treatment June 2016. When he does have diarrhea he will typically have 2-3 episodes per day, however, he does not experience diarrhea everyday. He reports that while in [place] his diarrhea was quite severe, particularly in the morning. He denies pain and cramping but endorses extreme urgency. He reports that before discontinuing dairy he was having up to 9 episodes of diarrhea per day. His last colonoscopy was in 2011 and he recalls it being normal. He has found that taking prednisone stabilizes his bowels but he is only taking it on an as needed basis. He denies ever seeing blood.     REVIEW OF SYSTEMS:   Hypothyroidism: He read an article in the [employer] [place] that said that 70% of people taking levothyroxine do not actually need to be on it. He is curious if he should discontinue the levothyroxine and see what happens. He believes his hypothyroidism initially appeared due to depression.     Prostate Cancer: He had a CT scan at [facility] back in April and was told there was no sign of cancer. He also received an injection of Lupron. He does not have any pending appointments with Dr. Shani.    He has not started  taking Crestor yet. He has a recurrent skin growth near his right ear. All other systems are negative.    PFSH:  He was in [place], [place] for the winter. He has a gallbladder. Reviewed as below.  History   Smoking Status     Never Smoker   Smokeless Tobacco     Never Used     Comment: in college social smoker       Family History   Problem Relation Age of Onset     Multiple sclerosis Mother      Heart disease Father      No Medical Problems Sister      No Medical Problems Sister      No Medical Problems Daughter      No Medical Problems Daughter        Social History     Social History     Marital status: Single     Spouse name: N/A     Number of children: 2     Years of education: N/A     Occupational History     Retired      Social History Main Topics     Smoking status: Never Smoker     Smokeless tobacco: Never Used      Comment: in college social smoker     Alcohol use 6.0 - 7.2 oz/week     10 - 12 Glasses of wine per week     Drug use: No     Sexual activity: No     Other Topics Concern     Not on file     Social History Narrative       Past Surgical History:   Procedure Laterality Date     APPENDECTOMY       CATARACT EXTRACTION       HERNIA REPAIR       [place] APPENDECTOMY      Description: Appendectomy;  Recorded: 03/17/2008;     [place] REMV PROSTATE,RETROPUB,RAD,LTD NODES      Description: Prostatectomy Retropubic Radical With Lymph Node Biopsy(S);  Recorded: 03/25/2007;     PROSTATE SURGERY  1992       Allergies   Allergen Reactions     Allopurinol      Atorvastatin Myalgia       Active Ambulatory Problems     Diagnosis Date Noted     Benign Adenomatous Polyp Of The Large Intestine      Major Depression, Single Episode      Osteopenia      Prostate cancer      Vitamin B12 Deficiency      Vitamin D Deficiency      Hyperlipidemia      Hypothyroidism      Gout      Insomnia      Hematuria      Chest Pain      Male Erectile Disorder      Neuritis      Osteoarthritis      Sinus Bradycardia      Vertigo      Urinary  incontinence 10/18/2016     Diarrhea due to drug 02/27/2017     Musculoskeletal symptom 02/27/2017     Resolved Ambulatory Problems     Diagnosis Date Noted     Prostate Cancer      Past Medical History:   Diagnosis Date     Gout      Hyperlipidemia      Hypothyroidism      Prostate cancer        VITALS:  Vitals:    05/17/17 1457   BP: 124/62   Pulse: 89   Resp: 16   Weight: 203 lb (92.1 kg)   Height: 6' (1.829 m)     Wt Readings from Last 3 Encounters:   05/17/17 203 lb (92.1 kg)   02/27/17 203 lb 3.2 oz (92.2 kg)   11/08/16 204 lb 8 oz (92.8 kg)     Body mass index is 27.53 kg/(m^2).    PHYSICAL EXAM:  Constitutional:  Reveals an alert and pleasant male.  Vitals:  Per nursing notes.  Ears:  Clear.  Oropharynx:  Without posterior nasal drainage or thrush.  Neck:  Supple, thyroid not palpable.  Cardiac:  Regular rate and rhythm without murmurs, rubs, or gallops. Legs without edema. Palpation of the radial pulse regular.  Lungs: Clear.  Respiratory effort normal.  Abdomen:   Bowel sounds positive, nontender, nondistended.  Neither liver nor spleen palpable.  Skin:   Without rash, bruise, or palpable lesions.   Rheumatologic: Normal joints and nails of the hands.  Neurologic:  Cranial nerves II-XII intact.     Psychiatric:  Mood appropriate, memory intact.     ADDITIONAL HISTORY SUMMARIZED (2): Reviewed colonoscopies from 3/24/11 and 1/31/06 regarding diarrhea. Reviewed progress notes from [facility] regarding diarrhea and prostate cancer.  DECISION TO OBTAIN EXTRA INFORMATION (1): None  RADIOLOGY TESTS (1): Reviewed CT scan from 4/11/16 regarding diarrhea. Reviewed MRI from 4/18/17 regarding prostate cancer.   LABS (1): Reviewed labs from [facility] from 4/19/17. Ordered labs  MEDICINE TESTS (1): None.  INDEPENDENT REVIEW (2 each): None.     The visit lasted a total of 36 minutes face to face with the patient. Over 50% of the time was spent counseling and educating the patient about diarrhea.    Ahmet REYNOLDS, am scribing for  and in the presence of, Dr. Manjarrez.    I, Dr. Manjarrez, personally performed the services described in this documentation, as scribed by Ahmet Spencer in my presence, and it is both accurate and complete.    MEDICATIONS:  Current Outpatient Prescriptions   Medication Sig Dispense Refill     aspirin 81 MG EC tablet Take 81 mg by mouth daily.       escitalopram oxalate (LEXAPRO) 10 MG tablet Take 1 tablet (10 mg total) by mouth daily. 90 tablet 3     ibuprofen (ADVIL,MOTRIN) 200 MG tablet Take 200 mg by mouth every 6 (six) hours as needed for pain.       levothyroxine (SYNTHROID, LEVOTHROID) 50 MCG tablet TAKE ONE TABLET EVERY DAY 90 tablet 3     OMEGA-3/DHA/EPA/FISH OIL (FISH OIL-OMEGA-3 FATTY ACIDS) 300-1,000 mg capsule Take 1 g by mouth daily.       predniSONE (DELTASONE) 5 MG tablet Take 1 tablet (5 mg total) by mouth 2 (two) times a day. 60 tablet 5     rosuvastatin (CRESTOR) 5 MG tablet Take 1 tablet (5 mg total) by mouth daily. 90 tablet 3     LORazepam (ATIVAN) 0.5 MG tablet Take 1-2 tablets (0.5-1 mg total) by mouth every 8 (eight) hours as needed for anxiety. 90 tablet 4     No current facility-administered medications for this visit.        Total data points:4

## 2021-06-12 NOTE — PATIENT INSTRUCTIONS - HE
Medicine list clarified    Trial of 1 or 2 vitamin B12 shots for the symptoms    Flu shot    Increase rosuvastatin to 10 mg daily-done    Repeat lipid profile    PSA every 3 months

## 2021-06-12 NOTE — PROGRESS NOTES
Patient would like the video invitation sent by: Text to cell phone: [phone]        ASSESSMENT:  1. Neuropathy  Sensation most likely to be neuropathic either intracranial or extracranial.  Consider occipital neuralgia, temporal neuralgia, trigeminal neuralgia.  Consider sinus.  Consider seizure equivalent.  Could use medication if he wishes to be minimalistic, vitamin B12 shot would be safest.  Trial of vitamin B12 shot  - cyanocobalamin injection 1,000 mcg    2. Lipid disorder  Recheck cholesterol profile on increased rosuvastatin  - Lipid Cascade; Future    3. Prostate cancer (H)  PSA every 3 months per [facility]  - PSA (Prostatic-Specific Antigen), Diagnostic; Future    Depression.  Stable off Lexapro    Preventive Health Care: Update flu shot    PLAN:  Patient Instructions   Medicine list clarified    Trial of 1 or 2 vitamin B12 shots for the symptoms    Flu shot    Increase rosuvastatin to 10 mg daily-done    Repeat lipid profile    PSA every 3 months    Orders Placed This Encounter   Procedures     PSA (Prostatic-Specific Antigen), Diagnostic     Standing Status:   Future     Standing Expiration Date:   10/12/2021     Lipid Cascade     Standing Status:   Future     Standing Expiration Date:   10/12/2021     Order Specific Question:   Fasting is required?     Answer:   Unknown     No follow-ups on file.      CHIEF COMPLAINT:  Chief Complaint   Patient presents with     Referral     ENT vs Neuro       HISTORY OF PRESENT ILLNESS:  Justin is a 74 y.o. male contacting the clinic today via video for report of neuropathy.  He has a long history of shooting flashing sensations in his head that have improved on Lexapro consistently.  Once again, he is tapered off Lexapro over the last few months.  He reports his mood is excellent, but he has developed a recurrence of these sensations.  They are midfrontal, and he describes them as pains or flares or flashing or bursts.  This will occur 10-15 times per day.  Currently  are no visual or auditory or sensory hallucinations.  He does not have the symptoms anyplace else.  He has taken topiramate and gabapentin in the past but cannot recall exactly what the side effects were.  He wishes to minimize his medication    In April due to an elevated cholesterol, his rosuvastatin was increased.  He has tolerated this well    He has been monitored closely at the [facility] for metastatic prostate cancer.  Last scans and lab testing in June were normal.  PSA is to be done every 3 months    REVIEW OF SYSTEMS:   Good mood.  No peripheral edema.  All other systems are negative.    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  Wt Readings from Last 3 Encounters:   04/11/19 204 lb 9.6 oz (92.8 kg)   04/10/19 211 lb 7 oz (95.9 kg)   10/17/18 205 lb (93 kg)       PHYSICAL EXAM:  (observations via Video)  Alert and oriented.  Jovial.  No visual abnormalities.  Seborrheic dermatitis.      ADDITIONAL HISTORY SUMMARIZED (2): Reviewed notes dating back to 2014 regarding Lexapro and neuropathy, with last trial off in 2012  CARE EVERYWHERE/ EXTRA INFORMATION (1):   RADIOLOGY TESTS (1): Reviewed PET scans showing no cranial abnormalities.  No MRI or CT scan of head  LABS (1): Ordered  CARDIOLOGY/MEDICINE TESTS (1):   INDEPENDENT REVIEW (2 each):     Total data points: 4    Video Start time: 3:19 PM  Video End time: 3:46 PM    The visit lasted a total of 27 minutes     CA Intake Time: 5 min     I have reviewed and updated the patient's Past Medical History, Social History, Family History as appropriate.    MEDICATIONS: Reviewed and updated per CA and MD  Current  "Outpatient Medications   Medication Sig Dispense Refill     alendronate (FOSAMAX) 70 MG tablet TAKE 1 TABLET BY MOUTH ONCE WEEKLY 12 tablet 2     aspirin 81 MG EC tablet Take 81 mg by mouth daily.       levothyroxine (SYNTHROID, LEVOTHROID) 150 MCG tablet Take 1 tablet (150 mcg total) by mouth daily. 90 tablet 3     rosuvastatin (CRESTOR) 10 MG tablet Take 1 tablet (10 mg total) by mouth daily. 90 tablet 3     latanoprost (XALATAN) 0.005 % ophthalmic solution        Current Facility-Administered Medications   Medication Dose Route Frequency Provider Last Rate Last Dose     [START ON 11/5/2020] cyanocobalamin injection 1,000 mcg  1,000 mcg Intramuscular Q30 Days Jalil Manjarrez MD             The patient has been notified of following:     \"This video visit will be conducted via a call between you and your physician/provider. We have found that certain health care needs can be provided without the need for an in-person physical exam.  This service lets us provide the care you need with a video conversation.  If a prescription is necessary we can send it directly to your pharmacy.  If lab work is needed we can place an order for that and you can then stop by our lab to have the test done at a later time.    Video visits are billed at different rates depending on your insurance coverage. Please reach out to your insurance provider with any questions.    If during the course of the call the physician/provider feels a video visit is not appropriate, you will not be charged for this service.\"    Patient has given verbal consent to a Video visit? Yes  How would you like to obtain your AVS? AVS Preference: [place].  If dropped by the video visit, the video invitation should be sent to: Text to cell phone: [phone]   Will anyone else be joining your video visit? No     Video-Visit Details    Type of service:  Video Visit    Originating Location (pt. Location): Home    Distant Location (provider location):  [facility] INTERNAL MEDICINE     Platform used for Video Visit: Deanne Manjarrez MD

## 2021-06-12 NOTE — PROGRESS NOTES
Pt came into infusion clinic for his Lupron as ordered. Pt c/o joint pain and stiffness in hands. Pt requesting Dr. Sahni be made aware of this. In-basket message sent. Pt tolerated injection well and left infusion clinic via ambulatory.

## 2021-06-13 NOTE — TELEPHONE ENCOUNTER
Medication Request  Medication name: Coronavirus vaccine  Requested Pharmacy: n/a  Reason for request: I am a 2 time cancer patient and I feel I need this ASAP.  Please return call to discuss this or please message me via [facility].  When did you use medication last?:  n/a  Patient offered appointment:  patient declined  Okay to leave a detailed message: yes

## 2021-06-14 ENCOUNTER — COMMUNICATION - HEALTHEAST (OUTPATIENT)
Dept: INTERNAL MEDICINE | Facility: CLINIC | Age: 76
End: 2021-06-14

## 2021-06-14 DIAGNOSIS — G62.9 NEUROPATHY: ICD-10-CM

## 2021-06-14 DIAGNOSIS — F32.9 MAJOR DEPRESSION, SINGLE EPISODE: ICD-10-CM

## 2021-06-14 NOTE — PROGRESS NOTES
ASSESSMENT:  1. Diarrhea, unspecified type  Reviewed email exchanges including empiric antibiotics such as metronidazole, diet adjustments, stool for ova and parasites and stool for C. difficile.  Persisting symptoms in the setting of evaluation and treatment for prostate cancer over the last year strongly suspicious for colitis such as lymphocytic or collagenous.  Recommend colonoscopy for this and history of colon polyps then treat accordingly  - Ambulatory referral for Colonoscopy    2. Prostate cancer  Reviewed treatment over the last year.  Now receiving Lupron shots    3. Benign neoplasm of descending colon  Previous polyp detected 2011.  Pathology not completely clear  - Ambulatory referral for Colonoscopy    4. Trigger finger, unspecified finger, unspecified laterality  Referral to therapy.  Occupational Therapy may be preferable.  - Ambulatory referral to Physical Therapy    5.  Health maintenance reviewed and up-to-date    PLAN:  Patient Instructions   Remaining reasons for diarrhea are:  Collagenous colitis  Lymphocytic colitis  Medicine side effect    Update colonoscopy    Use imodium as needed as you already are    Medicines for colitis are:  entocort which is a steroid which stays inside the colon,   Cholestyramine  Prednisone    Or stopping a medication which is triggering    See physical therapy for trigger fingers and jaw locking      Orders Placed This Encounter   Procedures     Influenza High Dose, Seasonal     Ambulatory referral for Colonoscopy     Referral Priority:   Routine     Referral Type:   Colonoscopy     Referral Reason:   Evaluation and Treatment     Requested Specialty:   Gastroenterology     Number of Visits Requested:   1     Ambulatory referral to Physical Therapy     Referral Priority:   Routine     Referral Type:   Physical Therapy     Referral Reason:   Evaluation and Treatment     Requested Specialty:   Physical Therapy     Number of Visits Requested:   1     Medications  Discontinued During This Encounter   Medication Reason     predniSONE (DELTASONE) 5 MG tablet        No Follow-up on file.    CHIEF COMPLAINT:  Chief Complaint   Patient presents with     Bowel issues       HISTORY OF PRESENT ILLNESS:  Justin is a 71 y.o. male presenting to the clinic today with complaints of diarrhea and bowel urgency.     Diarrhea: He went through chemotherapy for a recurrence of prostate cancer last year and developed bowel problems around that time. He started having trouble with bowel urgency and frequency. He tried cutting out certain foods such as gluten and dairy, but he did not see any improvement with that. He also tried taking probiotics, but that did not helped either. He has anywhere from two to six bowel movements per day. He states he can live with this problem, but he would like to investigate it further if there is a chance it could be from something more serious. He never has any form to the stool; they are always loose. He has a lot of gas as well. The timing of his bowel movements does seem to have some correlation with eating. He has had stool cultures and other tests done looking for infection, all negative. He had a CT scan last year that was normal. His last colonoscopy was in 2011, and he had a polyp removed at that time. He had an adenomatous polyp removed many years ago. He still has his gallbladder. He takes Imodium once or twice daily, generally after an attack.     Finger Stiffness: His fingers lock up on him frequently. He states there are times when he grabs an object and in unable to let go. He has the stiffness in all fingers of both hands. He acknowledges there is likely an arthritic component to this but inquires if it could be related to the Lupron in some way as well. He has noticed some improvement in the stiffness if he is diligent about stretching them. He would be interested in trying physical therapy.     Prostate Cancer: He continues to get Lupron  injections, but he is otherwise done with his prostate cancer treatment. His last few PSA's have been undetectable.     Hypothyroidism: He continues to take levothyroxine. His last TSH was 1.29 on 5/17/2017.     Anxiety/Depression: He continues to take escitalopram.      Hyperlipidemia: He continues to take rosuvastatin.     Health Maintenance: He will get a flu shot today. He is due for a colonoscopy.     REVIEW OF SYSTEMS:   He does not take any medications for acid reflux. He takes two ibuprofen in the morning and occasionally two at night, but he always takes it with food. He no longer takes prednisone. He has noticed that he sleeps more now than he used to. He occasionally has a clicking sensation in his jaw.  All other systems are negative.    PFSH:  He is leaving for [place] after Christmas.     TOBACCO USE:  History   Smoking Status     Never Smoker   Smokeless Tobacco     Never Used     Comment: in college social smoker       VITALS:  Vitals:    12/05/17 1438   BP: 124/74   Patient Site: Left Arm   Patient Position: Sitting   Cuff Size: Adult Regular   Pulse: 73   Weight: 205 lb 4.8 oz (93.1 kg)   Height: 6' (1.829 m)     Wt Readings from Last 3 Encounters:   12/05/17 205 lb 4.8 oz (93.1 kg)   05/17/17 203 lb (92.1 kg)   02/27/17 203 lb 3.2 oz (92.2 kg)     Body mass index is 27.84 kg/(m^2).    PHYSICAL EXAM:  Constitutional:  Reveals an alert, pleasant, talkative man. Affect appropriate.  Vitals:  Per nursing notes.  Cardiac:  Regular rate and rhythm without murmurs, rubs, or gallops. Legs without edema. Palpation of the radial pulse regular.  Lungs: Clear.  Respiratory effort normal.  Neurologic:  Cranial nerves II-XII intact.     Psychiatric:  Mood appropriate, memory intact.     MEDICATIONS:  Current Outpatient Prescriptions   Medication Sig Dispense Refill     aspirin 81 MG EC tablet Take 81 mg by mouth daily.       escitalopram oxalate (LEXAPRO) 10 MG tablet Take 1 tablet (10 mg total) by mouth daily.  90 tablet 3     ibuprofen (ADVIL,MOTRIN) 200 MG tablet Take 200 mg by mouth every 6 (six) hours as needed for pain.       levothyroxine (SYNTHROID, LEVOTHROID) 100 MCG tablet Take 1 tablet (100 mcg total) by mouth daily. 90 tablet 3     LORazepam (ATIVAN) 0.5 MG tablet Take 1-2 tablets (0.5-1 mg total) by mouth every 8 (eight) hours as needed for anxiety. 90 tablet 4     OMEGA-3/DHA/EPA/FISH OIL (FISH OIL-OMEGA-3 FATTY ACIDS) 300-1,000 mg capsule Take 1 g by mouth daily.       rosuvastatin (CRESTOR) 5 MG tablet Take 1 tablet (5 mg total) by mouth daily. 90 tablet 3     No current facility-administered medications for this visit.        ADDITIONAL HISTORY SUMMARIZED (2): Reviewed 2011 colonoscopy regarding diarrhea - polyp  DECISION TO OBTAIN EXTRA INFORMATION (1): None.   RADIOLOGY TESTS (1): 4/11/2016 CT - prostatectomy, no metastases.   LABS (1): Labs from May 2017 reviewed.   MEDICINE TESTS (1): None.  INDEPENDENT REVIEW (2 each): None.     The visit lasted a total of 34 minutes face to face with the patient. Over 50% of the time was spent counseling and educating the patient about his diarrhea.    I, Terrence Gaston, am scribing for and in the presence of, Dr. Manjarrez.    I, Dr. Manjarrez, personally performed the services described in this documentation, as scribed by Terrence Gaston in my presence, and it is both accurate and complete.    Total data points: 4

## 2021-06-14 NOTE — PROGRESS NOTES
[facility] Certification Request    December 12, 2017      Patient: Justin Pires  MR Number: [id number]  YOB: 1945  Date of Visit: 12/12/2017      Dear Dr. Jalil Manjarrez:    Thank you for this referral.   We are seeing Justin Pires in Occupational Therapy for finger pain.    Medicare and/or Medicaid requires physician review and approval of the treatment plan. Please review the plan of care and verify that you agree with the therapy plan of care by co-signing this note.      Plan of Care  Authorization / Certification Start Date: 12/12/17  Authorization / Certification End Date: 03/12/18  Authorization / Certification Number of Visits: 12  Communication with: Referral Source  Patient Related Instruction: Nature of Condition;Treatment plan and rationale;Basis of treatment;Expected outcome  Times per Week: 1  Number of Weeks: 12  Number of Visits: 12  Therapeutic Exercise: ROM;Strengthening  Functional Training (ADL's): ergonomics;compensatory training  Orthotic Fitting: OTC;custom    Goals:  Patient Will Demonstrate / Verbalize independence in self-management of condition in: 4 weeks  Patient will be independent with home exercise program in: 4 weeks  Patient will be able to: lift;carry;reach;with less pain;with less difficulty;for housework;for dressing;for grooming/hygiene;for grocery shopping;in 12 weeks  Patient will be able to  & pinch: ;hold;pinch;for dressing;for hygiene;for meal prep;in 12 weeks      If you have any questions or concerns, please don't hesitate to call.    Sincerely,      Luciana Anderson, OT        Physician recommendation:     ___ Follow therapist's recommendation        ___ Modify therapy      *Physician co-signature indicates they certify the need for these services furnished within this plan and while under their care.      [facility]   Upper Extremity Initial Evaluation    Patient Name: Justin Pires  Date of evaluation:  12/12/2017  Referral Diagnosis: trigger finger  Referring provider: Jalil Manjarrez MD  Visit Diagnosis:     ICD-10-CM    1. Bilateral hand pain M79.641     M79.642    2. Weakness of left hand R29.898    3. Weakness of right hand R29.898    4. Decreased activities of daily living (ADL) Z78.9        Assessment:      Pt. is appropriate for skilled OT intervention as outlined in the Plan of Care (POC).    Goals:  Patient Will Demonstrate / Verbalize independence in self-management of condition in: 4 weeks  Patient will be independent with home exercise program in: 4 weeks  Patient will be able to: lift;carry;reach;with less pain;with less difficulty;for housework;for dressing;for grooming/hygiene;for grocery shopping;in 12 weeks  Patient will be able to  & pinch: ;hold;pinch;for dressing;for hygiene;for meal prep;in 12 weeks    Patient's expectations/goals are realistic.    Barriers to Learning or Achieving Goals:  No Barriers.       Plan / Patient Instructions:        Plan of Care:   Authorization / Certification Start Date: 12/12/17  Authorization / Certification End Date: 03/12/18  Authorization / Certification Number of Visits: 12  Communication with: Referral Source  Patient Related Instruction: Nature of Condition;Treatment plan and rationale;Basis of treatment;Expected outcome  Times per Week: 1  Number of Weeks: 12  Number of Visits: 12  Therapeutic Exercise: ROM;Strengthening  Functional Training (ADL's): ergonomics;compensatory training  Orthotic Fitting: OTC;custom    Plan for next visit: possibly splinting for trigger finger     Subjective:        Social information:   Occupation:retired   Work Status:NA     History of Present Illness:    Justin is a 71 y.o. male who presents to therapy today with complaints of pain in PIP and DIP joints as well as multiple trigger fingers. Date of onset/duration of symptoms is about a year ago. Onset was gradual. Symptoms are not improving. He reports no history  of similar symptoms prior to a year ago. He describes their previous level of function as not limited. Functional limitations are described as occurring with gripping, holding and manipulating fingers, Write, lift and sheldon for ADL's and IADL's.    Pain rating at rest: 1  Pain rating with activity: 8       Objective:      Note: Items left blank indicates the item was not performed or not indicated at the time of the evaluation.    Patient Outcome Measures :    QuickDASH Score: 13.6    Upper Extremity Examination:  1. Bilateral hand pain     2. Weakness of left hand     3. Weakness of right hand     4. Decreased activities of daily living (ADL)       Precautions/Restrictions: None  Involved side: Bilateral  Atrophy:  Absent  Color: Normal  Temperature: Normal  Edema: Absent  Palpation: No tenderness.   Scar: NA  Guarded extremity: Normal.  Sensory: Patient reports normal.      Coordination  Right   Left     NT WNL Impaired NT WNL Impaired   Gross Motor  x   x    Fine Motor   x   x   9 hole peg x   x       Hand AROM  Right   Left     NT WNL Impaired NT WNL Impaired   Full Fist  x   x    Flat Fist  x   x    Claw Fist  x   x      ROM/STRENGTH RIGHT LEFT   Note: * indicates pain AROM AROM   Forearm Supination - 80? WNL WNL   Forearm Pronation - 80? WNL WNL   Wrist Flexion - 65-80?  WNL WNL   Wrist Extension - 65-80? WNL WNL   Ulnar Deviation - 20-35? WNL WNL   Radial Deviation - 10-20? WNL WNL    Strength WNL WNL   3 Point Pinch WNL WNL   Lateral Pinch WNL WNL     May benefit from PT evaluation: No    U/E orthosis currently:   No    Treatment Today: Patient did not want to address the triggering of his fingers today. He just wants strengthening for hands. Patient instructed on activity modification and joint protection for wrists and fingers. (avoid pain, built up handles etc). He was also instructed on isometric strengthening in wrist flexion/extension, pronation/supination,  and pinch strength.  TREATMENT MINUTES  COMMENTS   Evaluation 20    Self-care/ Home management 8 Instructed on activity modification and joint protection techniques   Manual therapy     Neuromuscular Re-education     Orthotic fitting     Therapeutic Exercises 17 Instructed on strengthening program-see above description   Iontophoresis           Total 45    Blank areas are intentional and mean the treatment did not include these items.     GOALS AND PLAN OF CARE WERE ESTABLISHED IN COOPERATION WITH THE PATIENT    OT Evaluation Code: (Please list factors)   Comorbidities: OA, hypothyroid, HLD, trigger finger  Profile/History Review: Brief    Need for eval modification: No    # Treatment options: Limited    Clinical Decision Making:  Low      Occupational Profile/ Medical and Therapy History and Comorbidities Occupational Performance Clinical Decision Making   (Complexity)   brief history with review of medical/therapy records related to the presenting problem.  No comorbidities 1-3 Performance deficits that result in activity limitations and/or participation restrictions.    No Assessment Modification  Low complexity, which includes  problem-focused assessments, and consideration of a limited number of treatment options.      expanded review of medical/therapy records and additional review of physical, cognitive and psychosocial history.    May have comorbidities 3-5 Performance deficits that result in activity limitations and/or participation restrictions.    Minimal to moderate modification of assessment Moderate complexity, which includes analysis of data from detailed assessments, and consideration of several treatment options.         Review of medical/therapy records and extensive additional review of physical, cognitive and psychosocial history.  Comorbidities affect occupational performance 5 or more Performance deficits that result in activity limitations and/or participation restrictions.    Significant modification of assessment High complexity,  analysis of  Occupational profile and data,  Comprehensive assessments, multiple treatment options.            Luciana Anderson  12/12/2017  10:35-11:20 AM

## 2021-06-14 NOTE — TELEPHONE ENCOUNTER
RN cannot approve Refill Request    RN can NOT refill this medication medication not on med list. Last office visit: 4/10/2019 Jalil Manjarrez MD Last Physical: 5/16/2018 Last MTM visit: Visit date not found Last visit same specialty: 4/10/2019 Jalil Manjarrez MD.  Next visit within 3 mo: Visit date not found  Next physical within 3 mo: Visit date not found      Denae Saravia, [facility] Triage/Med Refill 1/10/2021    Requested Prescriptions   Pending Prescriptions Disp Refills     sildenafiL (VIAGRA) 50 MG tablet [Pharmacy Med Name: Sildenafil Citrate Oral Tablet 50 MG] 20 tablet 0     Sig: TAKE 1 TABLET BY MOUTH DAILY AS NEEDED FOR ERECTILE DYSFUNCTION       Medications for Impotence Refill Protocol Passed - 1/9/2021 11:00 AM        Passed - PCP or prescribing provider visit in last year     Last office visit with prescriber/PCP: 4/10/2019 Jalil Manjarrez MD OR same dept: Visit date not found OR same specialty: 4/10/2019 Jalil Manjarrez MD  Last physical: 5/16/2018 Last MTM visit: Visit date not found   Next visit within 3 mo: Visit date not found  Next physical within 3 mo: Visit date not found  Prescriber OR PCP: Jalil Manjarrez MD  Last diagnosis associated with med order: There are no diagnoses linked to this encounter.  If protocol passes may refill for 12 months if within 3 months of last provider visit (or a total of 15 months).

## 2021-06-14 NOTE — TELEPHONE ENCOUNTER
Refill Approved    Rx renewed per Medication Renewal Policy. Medication was last renewed on 1/18/20.    Vesna Sims, [facility] Triage/Med Refill 1/27/2021     Requested Prescriptions   Pending Prescriptions Disp Refills     alendronate (FOSAMAX) 70 MG tablet [Pharmacy Med Name: Alendronate Sodium Oral Tablet 70 MG] 12 tablet 0     Sig: TAKE ONE TABLET BY MOUTH EVERY 7 DAYS       Biphosphonates Refill Protocol Passed - 1/26/2021  8:40 AM        Passed - PCP or prescribing provider visit in last 12 months     Last office visit with prescriber/PCP: 4/10/2019 Jalil Manjarrez MD OR same dept: Visit date not found OR same specialty: 4/10/2019 Jalil Manjarrez MD  Last physical: 5/16/2018 Last MTM visit: Visit date not found   Next visit within 3 mo: Visit date not found  Next physical within 3 mo: Visit date not found  Prescriber OR PCP: Jalil Manjarrez MD  Last diagnosis associated with med order: 1. Osteopenia  - alendronate (FOSAMAX) 70 MG tablet [Pharmacy Med Name: Alendronate Sodium Oral Tablet 70 MG]; TAKE ONE TABLET BY MOUTH EVERY 7 DAYS  Dispense: 12 tablet; Refill: 0    If protocol passes may refill for 12 months if within 3 months of last provider visit (or a total of 15 months).             Passed - Serum creatinine in last 12 months     Creatinine   Date Value Ref Range Status   04/15/2020 0.84 0.70 - 1.30 mg/dL Final

## 2021-06-14 NOTE — PROGRESS NOTES
[facility] Progress Note    Patient: Justin Pires  MRN: [id number]  Date of Service: 12/21/2017        Reason for visit      1. Prostate cancer        Assessment     1.  Metastatic prostate cancer with involvement of the axillary lymph node, bones and intra-abdominal lymph nodes. He responded very well to treatment. PSA is undetectable.  2.  Significant improvement in his PSA after Lupron injection.  3.  Improvement in his diarrhea.  4.  Musculoskeletal stiffness in his hands.     Plan     1.  Continue Lupron indefinitely.  2.  F/u with urology regarding ED.  3.  Continue with good diet and exercise.  4.  He will be going to [place] for winter.  5.  PSA today.    Clinical stage      Prostate cancer    Primary site: Prostate    Clinical: Stage II (T2a, N0, M0) - Signed by Turner Sahni MD on 5/11/2016    Pathologic: Stage II (T2b, N0, M0) - Signed by Turner Sahni MD on 5/11/2016    Summary: Stage II (T2b, N0, M0)      History     Justin Pires is a very pleasant 71 y.o. old male with a history of elevated PSA. He is actually prior history of prostate cancer diagnosed in 1992 when he presented to [facility] for a executive physical and was found to have elevated PSA. He was completely is symptomatically that point. His workup then revealed that he indeed had prostate cancer. At that point at 47 years of age he underwent retropubic prostatectomy which revealed prostate cancer involving the left lobe of the prostate grade 2 out of 3 with negative margins and negative lymph nodes. He had been in remission ever since. In January 2016 he had a PSA checked on which actually came back at 10.7. It was thought at the time that he might have prostatitis. He was given some antibiotics. He then went down to [place]. There he had his PSA checked and was down to 6. He came back in April and had a repeat PSA checked which was elevated again at 15.7. Therefore Dr. Manjarrez asked him to come and see me.  He is also been  seen by his urologist Dr. Fortunato Collazo. He had MRI CT scan and bone scan done. None of them are revealing any lesion.    He had a ProstaScint scan which actually showed some uptake on the right prostatic fossa.  I sent him to Dr. Browne's for another ultrasound-guided biopsy of his right prostate bed.  The biopsy was negative for any detectable prostate cancer.    He then was seen at the [facility] and got a C11 PET-choline image that showed metastatic disease with bone mets and lymph node mets. He had an axillary lymph node biopsied that was consistent with prostate cancer. He has been started on Lupron in June 2016 and in July 26th started on Taxotere. Had his first cycle end of July. Tolerated it with expected side effects. His Psa basically became undetectable very quickly. He has finished 6 cycles. Last one in November 2016.    Had C11 PET scan at [facility] that showed near complete response. He was complaining of diarrhea but that has gotten better. His MSK symptoms are also better except the hands still cramp. He was taking prednisone but now off of it. Had colonoscopy that showed a polyp.     Past Medical History     Past Medical History:   Diagnosis Date     Gout      Hyperlipidemia      Hypothyroidism      Prostate cancer        Review of Systems   Constitutional  Constitutional (WDL): Exceptions to WDL  Neurosensory  Neurosensory (WDL): All neurosensory elements are within defined limits  Cardiovascular  Cardiovascular (WDL): All cardiovascular elements are within defined limits  Pulmonary  Respiratory (WDL): Within Defined Limits  Gastrointestinal  Gastrointestinal (WDL): All gastrointestinal elements are within defined limits  Genitourinary  Genitourinary (WDL): All genitourinary elements are within defined limits  Integumentary  Integumentary (WDL): Exceptions to WDL (boil on bottom healing)  Patient Coping  Patient Coping: Accepting  Accompanied by  Accompanied by: Alone    ECOG performance status and  Distress Assessment      ECOG Performance:    ECOG Performance Status: 0    Distress Assessment  Distress Assessment Score: No distress:     Pain Status  Currently in Pain: No/denies        Vital Signs     Vitals:    12/21/17 1433   BP: 118/76   Pulse: 67   Temp: 98  F (36.7  C)   SpO2: 96%       Physical Exam     GENERAL: no acute distress. Cooperative in conversation.   HEENT: Chemotherapy associated alopecia. Pupils are equal, round and reactive. Oral mucosa is moist and intact.  RESP:Chest symmetric. Good air entry b/l. Regular respiratory rate. No stridor.  CVS: normal rate, regular rhythm, normal S1, S2, no murmurs, rubs, clicks or gallops.  ABD: Nondistended, soft. Non tender, no HSM. Bowel sounds normal.  EXTREMITIES: No lower extremity edema.   NEURO: non focal. Alert and oriented x3.   PSYCH: within normal limits. No depression or anxiety.  SKIN: warm dry intact.        Lab Results     Results for orders placed or performed in visit on 05/24/17   Ova and Parasite, Stool   Result Value Ref Range    Ova + Parasite Exam No ova or parasites seen No Ova or Parasites seen   Ova and Parasite, Stool   Result Value Ref Range    Ova + Parasite Exam No ova or parasites seen No Ova or Parasites seen   Ova and Parasite, Stool   Result Value Ref Range    Ova + Parasite Exam No ova or parasites seen No Ova or Parasites seen   C. Diff Toxin By PCR   Result Value Ref Range    C.Difficile Toxigenic by PCR Negative Negative         Imaging Results     No results found.          Turner Sahni MD

## 2021-06-14 NOTE — TELEPHONE ENCOUNTER
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  Meds reviewed.                      Physical Exam:   Blood pressure 103/62, pulse 75, temperature 98.6  F (37  C), temperature source Oral, resp. rate 14, height 5' 4\" (1.626 m), weight 150 lb 11.2 oz (68.4 kg), last menstrual period 2018, SpO2 97 %, not currently breastfeeding.  Temperature Temp  Av.3  F (36.8  C)  Min: 97.6  F (36.4  C)  Max: 98.6  F (37  C)   I/O last 3 completed shifts:  In: 700 [P.O.:700]  Out: -   Constitutional:  Awake, alert, oriented, and in no apparent distress.   Lungs: No increased work of breathing, good air exchange, clear to auscultation bilaterally, and no crackles or wheezing.   Cardiovascular: Regular rate and rhythm, normal S1 and S2, and no murmur noted.   Abdomen: Soft, non-distended, appropriately tender at incision(s), + BS.   Wound(s): Clean, dry, and intact. No erythema or drainage.    Extremities: No edema or calf tenderness. +SCDs          Data:     Recent Labs   Lab Test  18   0556  18   0530  18   1830   NA   --   138  140   POTASSIUM   --   4.1  3.7   CHLORIDE   --   106  107   CO2   --   27  28   BUN   --   7  9   CR  0.73  0.61  0.67       APARNA PrattC  [facility]  [phone]    " tadalafil (CIALIS) 20 MG tablet [[id number]]    Electronically signed by: Jalil Manjarrez MD on 02/20/19 1321 Status: Discontinued   Ordering user: Jalil Manjarrez MD 02/20/19 1321 Authorized by: Jalil Manjarrez MD   PRN reasons: erectile dysfunction   Frequency: Daily PRN 02/20/19 - 04/13/20  Discontinued by: Jalil Manjarrez MD 04/13/20 2489 [Therapy completed]

## 2021-06-14 NOTE — PROGRESS NOTES
Pt ambulates to infusion center following MD visit.  Pt seen by Dr. Sahni and orders approved.  Eligard given SQ as ordered without complication.  Flu vaccine given per patient request.  Pt left clinic stable to [facility].  Plan RTC as scheduled.

## 2021-06-15 NOTE — PROGRESS NOTES
[facility] Discharge Summary  Patient Name: Justin Pires  Date: 1/9/2018  Referral Diagnosis: trigger finger  Referring provider: Jalil Manjarrez MD  Visit Diagnosis:   1. Bilateral hand pain     2. Weakness of left hand     3. Weakness of right hand     4. Decreased activities of daily living (ADL)         Goal status: Unable to assess as patient did not return.    Patient was seen for 1 visit. The patient discontinued therapy, did not return.    The patient will need a new referral to resume.    Thank you for your referral.  Luciana Anderson  1/9/2018  8:11 AM
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
## 2021-06-16 NOTE — TELEPHONE ENCOUNTER
Telephone Encounter by Chelsea Loomis at 2/14/2019  8:58 AM     Author: Chelsea Loomis Service: -- Author Type: --    Filed: 2/14/2019  9:00 AM Encounter Date: 2/8/2019 Status: Signed    : Chelsea Loomis       PRIOR AUTHORIZATION DENIED    Denial Rational: DX DOES NOT MEET MEDICARE GUIDELINES. PATIENT NEEDS TO HAVE A DX OF SEIZURES OR MIGRAINE HEADACHES.    Appeal Information:

## 2021-06-16 NOTE — TELEPHONE ENCOUNTER
Telephone Encounter by Chelsea Loomis at 2/13/2019 11:12 AM     Author: Chelsea Loomis Service: -- Author Type: --    Filed: 2/13/2019 11:13 AM Encounter Date: 2/8/2019 Status: Signed    : Chelsea Loomis       PA INITIATION

## 2021-06-17 NOTE — PATIENT INSTRUCTIONS - HE
Patient Instructions by Monica Aaron CNP at 4/11/2019 10:00 AM     Author: Monica Aaron CNP Service: -- Author Type: Nurse Practitioner    Filed: 4/11/2019 10:33 AM Encounter Date: 4/11/2019 Status: Signed    : Monica Aaron CNP (Nurse Practitioner)           Important Instructions     * Stop using your current products and dressings                    How to care for your wound    Cleanse wound with saline sent to you with your supplies or a wound wash found at the pharmacy.    *   Fill the wound with Santyl (see below    *   Cover the wound with Xeroform and Medipore + Pad    *   Change dressing: every day    It is recommended that you do not get your ulcer wet when showering.  Listed below are several ways of keeping it dry when you shower.     1. Wrap it with Press and Seal plastic wrap.  It can be found in the stores where the plastic wrap or tin foil is kept.    2  Put your leg in a plastic bag and tape it on.  Using a bread bag or newspaper bag works well.             4. You can purchase a cast protctor at some pharmacies and on [employer].         Cast and Bandage Protector        *   Do not get your ulcer wet when you shower or take a bath.  You can cover it with cling wrap, a bag taped to your skin or a cast protector.    *   Good nutrition is important for wound healing.  I recommend increasing your protein.  You can do this through your diet, nutritional supplements, and/or protein powder.      Please call [facility] before substituting any product    Call our clinic nurse at [phone] if there is an increase in drainage, pain, redness, or the wound size increases.  This maybe a sign of infection and require attention prior to the next appointment      Monica Aaron, APRN, KEIRY, CWCN

## 2021-06-17 NOTE — PROGRESS NOTES
I mailed a letter for navigator follow up to patient as a reminder of what I can assist with and that I am available if needed for any resources.

## 2021-06-17 NOTE — TELEPHONE ENCOUNTER
Refill Approved    Rx renewed per Medication Renewal Policy. Medication was last renewed on 4/15/20.    Pavel Dowell, [facility] Triage/Med Refill 5/18/2021     Requested Prescriptions   Pending Prescriptions Disp Refills     rosuvastatin (CRESTOR) 10 MG tablet [Pharmacy Med Name: Rosuvastatin Calcium Oral Tablet 10 MG] 90 tablet 0     Sig: TAKE ONE TABLET BY MOUTH ONE TIME DAILY       Statins Refill Protocol (Hmg CoA Reductase Inhibitors) Passed - 5/17/2021 10:45 AM        Passed - PCP or prescribing provider visit in past 12 months      Last office visit with prescriber/PCP: 4/10/2019 Jalil Manjarrez MD OR same dept: Visit date not found OR same specialty: 4/10/2019 Jalil Manjarrez MD  Last physical: 5/16/2018 Last MTM visit: Visit date not found   Next visit within 3 mo: Visit date not found  Next physical within 3 mo: Visit date not found  Prescriber OR PCP: Jalil Manjarrez MD  Last diagnosis associated with med order: 1. Lipid disorder  - rosuvastatin (CRESTOR) 10 MG tablet [Pharmacy Med Name: Rosuvastatin Calcium Oral Tablet 10 MG]; TAKE ONE TABLET BY MOUTH ONE TIME DAILY   Dispense: 90 tablet; Refill: 0    2. Hypothyroidism due to acquired atrophy of thyroid  - levothyroxine (SYNTHROID, LEVOTHROID) 150 MCG tablet [Pharmacy Med Name: Levothyroxine Sodium Oral Tablet 150 MCG]; TAKE ONE TABLET BY MOUTH ONE TIME DAILY   Dispense: 90 tablet; Refill: 0    If protocol passes may refill for 12 months if within 3 months of last provider visit (or a total of 15 months).                levothyroxine (SYNTHROID, LEVOTHROID) 150 MCG tablet [Pharmacy Med Name: Levothyroxine Sodium Oral Tablet 150 MCG] 90 tablet 0     Sig: TAKE ONE TABLET BY MOUTH ONE TIME DAILY       Thyroid Hormones Protocol Failed - 5/17/2021 10:45 AM        Failed - TSH on file in past 12 months for patient age 12 & older     TSH   Date Value Ref Range Status   04/15/2020 0.23 (L) 0.30 - 5.00 uIU/mL Final                   Passed -  Provider visit in past 12 months or next 3 months     Last office visit with prescriber/PCP: 4/10/2019 Jalil Manjarrez MD OR same dept: Visit date not found OR same specialty: 4/10/2019 Jalil Manjarrez MD  Last physical: 5/16/2018 Last MTM visit: Visit date not found   Next visit within 3 mo: Visit date not found  Next physical within 3 mo: Visit date not found  Prescriber OR PCP: Jalil Manjarrez MD  Last diagnosis associated with med order: 1. Lipid disorder  - rosuvastatin (CRESTOR) 10 MG tablet [Pharmacy Med Name: Rosuvastatin Calcium Oral Tablet 10 MG]; TAKE ONE TABLET BY MOUTH ONE TIME DAILY   Dispense: 90 tablet; Refill: 0    2. Hypothyroidism due to acquired atrophy of thyroid  - levothyroxine (SYNTHROID, LEVOTHROID) 150 MCG tablet [Pharmacy Med Name: Levothyroxine Sodium Oral Tablet 150 MCG]; TAKE ONE TABLET BY MOUTH ONE TIME DAILY   Dispense: 90 tablet; Refill: 0    If protocol passes may refill for 12 months if within 3 months of last provider visit (or a total of 15 months).

## 2021-06-17 NOTE — TELEPHONE ENCOUNTER
RN cannot approve Refill Request    RN can NOT refill this medication Protocol failed and NO refill given. Last office visit: 4/10/2019 Jalil Manjarrez MD Last Physical: 5/16/2018 Last MTM visit: Visit date not found Last visit same specialty: 4/10/2019 Jalil Manjarrez MD.  Next visit within 3 mo: Visit date not found  Next physical within 3 mo: Visit date not found      Pavel EDIBeata Dowell, [facility] Triage/Med Refill 5/18/2021    Requested Prescriptions   Pending Prescriptions Disp Refills     levothyroxine (SYNTHROID, LEVOTHROID) 150 MCG tablet [Pharmacy Med Name: Levothyroxine Sodium Oral Tablet 150 MCG] 90 tablet 0     Sig: TAKE ONE TABLET BY MOUTH ONE TIME DAILY       Thyroid Hormones Protocol Failed - 5/17/2021 10:45 AM        Failed - TSH on file in past 12 months for patient age 12 & older     TSH   Date Value Ref Range Status   04/15/2020 0.23 (L) 0.30 - 5.00 uIU/mL Final                   Passed - Provider visit in past 12 months or next 3 months     Last office visit with prescriber/PCP: 4/10/2019 Jalil Manjarrez MD OR same dept: Visit date not found OR same specialty: 4/10/2019 Jalil Manjarrez MD  Last physical: 5/16/2018 Last MTM visit: Visit date not found   Next visit within 3 mo: Visit date not found  Next physical within 3 mo: Visit date not found  Prescriber OR PCP: Jalil Manjarrez MD  Last diagnosis associated with med order: 1. Lipid disorder  - rosuvastatin (CRESTOR) 10 MG tablet; TAKE ONE TABLET BY MOUTH ONE TIME DAILY   Dispense: 90 tablet; Refill: 1    2. Hypothyroidism due to acquired atrophy of thyroid  - levothyroxine (SYNTHROID, LEVOTHROID) 150 MCG tablet [Pharmacy Med Name: Levothyroxine Sodium Oral Tablet 150 MCG]; TAKE ONE TABLET BY MOUTH ONE TIME DAILY   Dispense: 90 tablet; Refill: 0    If protocol passes may refill for 12 months if within 3 months of last provider visit (or a total of 15 months).              Signed Prescriptions Disp Refills    rosuvastatin  (CRESTOR) 10 MG tablet 90 tablet 1     Sig: TAKE ONE TABLET BY MOUTH ONE TIME DAILY       Statins Refill Protocol (Hmg CoA Reductase Inhibitors) Passed - 5/17/2021 10:45 AM        Passed - PCP or prescribing provider visit in past 12 months      Last office visit with prescriber/PCP: 4/10/2019 Jalil Manjarrez MD OR same dept: Visit date not found OR same specialty: 4/10/2019 Jalil Manjarrez MD  Last physical: 5/16/2018 Last MTM visit: Visit date not found   Next visit within 3 mo: Visit date not found  Next physical within 3 mo: Visit date not found  Prescriber OR PCP: Jalil Manjarrez MD  Last diagnosis associated with med order: 1. Lipid disorder  - rosuvastatin (CRESTOR) 10 MG tablet; TAKE ONE TABLET BY MOUTH ONE TIME DAILY   Dispense: 90 tablet; Refill: 1    2. Hypothyroidism due to acquired atrophy of thyroid  - levothyroxine (SYNTHROID, LEVOTHROID) 150 MCG tablet [Pharmacy Med Name: Levothyroxine Sodium Oral Tablet 150 MCG]; TAKE ONE TABLET BY MOUTH ONE TIME DAILY   Dispense: 90 tablet; Refill: 0    If protocol passes may refill for 12 months if within 3 months of last provider visit (or a total of 15 months).

## 2021-06-18 NOTE — PROGRESS NOTES
Assessment and Plan:       1. Routine general medical examination at a health care facility  Stable.  Update labs not done through extensive oncology evaluation.:  Up-to-date.  Prostate up-to-date  - Glucose    2. Encounter for screening for diabetes mellitus  Check blood sugar    3. Encounter for screening for lipoid disorders  Update labs on Crestor.    4. Major depressive disorder with single episode, in full remission  Doing well especially with remission of prostate cancer    5. Hypothyroidism due to acquired atrophy of thyroid  Reviewed email exchange and email advice to increase levothyroxine to 150 mcg.  He felt immediately better.  Recheck TSH on this level  - Thyroid Stimulating Hormone (TSH)    6. Prostate cancer  Reviewed [facility] notes.  Recommend bone density  - DXA Bone Density Scan; Future    7. Lipid disorder  Reviewed email and readjustment of medication  - Lipid Cascade    8. Vitamin D deficiency  Update labs  - Vitamin D, Total (25-Hydroxy)        The patient's current medical problems were reviewed.    The following health maintenance schedule was reviewed with the patient and provided in printed form in the after visit summary:   Health Maintenance   Topic Date Due     DEPRESSION FOLLOW UP  1945     FALL RISK ASSESSMENT  06/09/2016     ADVANCE DIRECTIVES DISCUSSED WITH PATIENT  01/28/2021     TD 18+ HE  08/25/2021     COLONOSCOPY  12/11/2027     PNEUMOCOCCAL POLYSACCHARIDE VACCINE AGE 65 AND OVER  Completed     INFLUENZA VACCINE RULE BASED  Completed     PNEUMOCOCCAL CONJUGATE VACCINE FOR ADULTS (PCV13 OR PREVNAR)  Completed     ZOSTER VACCINE  Completed        Subjective:   Chief Complaint: Justin Pires is an 72 y.o. male here for an Annual Wellness visit.     HPI:  His blood pressure and pulse are within normal limits. His BMI is 27. His colonoscopy is up to date. He is due for a DXA. He is due for Shingrix vaccine series.     Prostate Cancer: He had a prostatectomy in 1992. He has  had routine surveillance including imaging and PSA. His PSA had remained unremarkable until 2015. In 2015, he did chemotherapy, and had 6 rounds. He recently saw his urologist at [place], and was found to be stable without any detectable cancer.     Hypothyroidism: He is currently taking levothyroxine 150mcg. He feels the increase in dose did help his energy level. He did try to 200mcg levothyroxine, but felt this was too much for him, and he could not sleep.     Dyslipidemia: He continues the rosuvastatin 5mg daily. He is still having mild myalgias, but these are much improved since switching statin medications.     Depression: This is stable on Lexapro 10mg.     Review of Systems:  Seen in the ED in [place] for abdominal injury after skiing, and has a hematoma. No more problems with diarrhea. He has been having itching in his ears recently. He is having some pain in the fingers and knees. He has been treated for gout.  All other systems are negative.     Patient Care Team:  Jalil Manjarrez MD as PCP - General  Kirti Shields RN as Registered Nurse  Turner Sahni MD as Physician (Hematology and Oncology)     Patient Active Problem List   Diagnosis     Benign Adenomatous Polyp Of The Large Intestine     Major depressive disorder, single episode     Osteopenia     Prostate cancer     Vitamin B12 Deficiency     Vitamin D Deficiency     Lipid disorder     Hypothyroidism due to acquired atrophy of thyroid     Gout     Insomnia     Hematuria     Male Erectile Disorder     Neuritis     Osteoarthritis     Sinus Bradycardia     Vertigo     Urinary incontinence     Diarrhea due to drug     Past Medical History:   Diagnosis Date     Gout      Hyperlipidemia      Hypothyroidism      Prostate cancer       Past Surgical History:   Procedure Laterality Date     APPENDECTOMY       CATARACT EXTRACTION       COLONOSCOPY  12/11/2017     HERNIA REPAIR       NH APPENDECTOMY      Description: Appendectomy;  Recorded: 03/17/2008;      DE REMV PROSTATE,RETROPUB,RAD,LTD NODES      Description: Prostatectomy Retropubic Radical With Lymph Node Biopsy(S);  Recorded: 03/25/2007;     PROSTATE SURGERY  1992      Family History   Problem Relation Age of Onset     Multiple sclerosis Mother      Heart disease Father      Aortic aneurysm Father      Stroke Sister      No Medical Problems Sister      No Medical Problems Daughter      No Medical Problems Daughter       Social History     Social History     Marital status: Single     Spouse name: N/A     Number of children: 2     Years of education: N/A     Occupational History     Retired      Social History Main Topics     Smoking status: Never Smoker     Smokeless tobacco: Never Used      Comment: in college social smoker     Alcohol use 6.0 - 7.2 oz/week     10 - 12 Glasses of wine per week     Drug use: No     Sexual activity: No     Other Topics Concern     Not on file     Social History Narrative      Current Outpatient Prescriptions   Medication Sig Dispense Refill     aspirin 81 MG EC tablet Take 81 mg by mouth daily.       escitalopram oxalate (LEXAPRO) 10 MG tablet Take 1 tablet (10 mg total) by mouth daily. 90 tablet 3     levothyroxine (SYNTHROID, LEVOTHROID) 150 MCG tablet Take 1 tablet (150 mcg total) by mouth daily. 90 tablet 3     OMEGA-3/DHA/EPA/FISH OIL (FISH OIL-OMEGA-3 FATTY ACIDS) 300-1,000 mg capsule Take 1 g by mouth daily.       rosuvastatin (CRESTOR) 5 MG tablet Take 1 tablet (5 mg total) by mouth daily. 90 tablet 3     No current facility-administered medications for this visit.       Objective:   Vital Signs:   Visit Vitals     /72 (Patient Site: Left Arm, Patient Position: Sitting, Cuff Size: Adult Regular)     Pulse 78     Ht 6' (1.829 m)     Wt 205 lb 14.4 oz (93.4 kg)     SpO2 96%     BMI 27.93 kg/m2          PHYSICAL EXAM  Constitutional:  Reveals an alert, pleasant male.  Vitals:  Per nursing notes.  Ears:  Clear.   Oropharynx:  Without posterior nasal drainage or  thrush.  Neck:  Supple, thyroid not palpable.  Cardiac:  Regular rate and rhythm without murmurs, rubs, or gallops. Carotids without bruits. Legs without edema. Palpation of the radial pulse regular.  Lungs: Clear.  Respiratory effort normal.  Abdomen:   Bowel sounds positive, nontender, nondistended.  Neither liver nor spleen palpable. Residual hematoma periumbilical on the right side.   Skin:   Without rash, bruise, or palpable lesions. Subcutaneous, firm nodule on left upper arm.   Rheumatologic: Normal joints and nails of the hands.  Neurologic:  Cranial nerves II-XII intact.     Psychiatric:  Mood appropriate, memory intact.     Assessment Results 5/16/2018   Activities of Daily Living No help needed   Instrumental Activities of Daily Living No help needed   Get Up and Go Score Less than 12 seconds   Mini Cog Total Score 4   Some recent data might be hidden     A Mini-Cog score of 0-2 suggests the possibility of dementia, score of 3-5 suggests no dementia    Identified Health Risks:     The patient was provided with written information regarding signs of hearing loss.  Patient's advanced directive was discussed and I am comfortable with the patient's wishes.        ADDITIONAL HISTORY SUMMARIZED (2): Reviewed MyChart encounter from 4/13/2018 regarding hypothyroidism. Reviewed urology [facility] from [facility] from 4/24/2018.   DECISION TO OBTAIN EXTRA INFORMATION (1): None.   RADIOLOGY TESTS (1): Ordered DXA.  LABS (1): Reviewed and ordered labs today.  MEDICINE TESTS (1): None.  INDEPENDENT REVIEW (2 each): None.     The visit lasted a total of 28 minutes face to face with the patient. Over 50% of the time was spent counseling and educating the patient about health maintenance.    I, Angela Peres, am scribing for and in the presence of, Dr. Manjarrez.    IDr. Manjarrez, personally performed the services described in this documentation, as scribed by Angela Peres in my presence, and it is both  accurate and complete.      Total Data Points: 4

## 2021-06-18 NOTE — PROGRESS NOTES
Pt ambulates to infusion center for injection.  Pt voices no new concerns today.  Eligard injection given SQ in right hip, per pt request, without difficulty.  Pt left clinic stable to lobby.  Plan RTC as scheduled.

## 2021-06-19 ENCOUNTER — HEALTH MAINTENANCE LETTER (OUTPATIENT)
Age: 76
End: 2021-06-19

## 2021-06-19 NOTE — LETTER
Letter by Monica Aaron CNP at      Author: Monica Aaron CNP Service: -- Author Type: --    Filed:  Encounter Date: 2019 Status: (Other)       2019    [facility]  Fax: [phone] Wound Dressing Rx and Order Form  Customer Service: [phone] Order Status: New Order   Verbal: Americo Kitchen LPN            Patient Info:  Name: Justin Pires  : 1945  Address:   [address] [place] [address]  Phone: [phone]      Insurance Info:  Primary: Payor: UCARE / Plan: UCARE MEDICARE / Product Type: MEDICARE ADVANTAGE /    Secondary: N/A N/A  [id number] - (Medicare)      Physician Info:   Name:  Monica Aaron CNP   Dept Address/Phones:   [address] [place] [address], [facility] [address]  [facility] [address] [phone]  Fax: [phone]    Lymphedema circumferential measurements (in cm):  No data recorded  No data recorded  No data recorded  No data recorded  No data recorded  No data recorded  No data recorded  No data recorded  No data recorded  No data recorded  No data recorded  No data recorded  No data recorded  No data recorded  No data recorded  No data recorded    Wound info:  Encounter Diagnoses   Name Primary?   ? Skin ulcer of left ankle with fat layer exposed (H) Yes   ? Vitamin D deficiency    ? Hypothyroidism due to acquired atrophy of thyroid    ? Neuropathy (H)      VASC Wound 19 Left medial ankle (Active)   Pre Size Length 5 2019 10:00 AM   Pre Size Width 0.8 2019 10:00 AM   Pre Size Depth 0.2 2019 10:00 AM   Pre Total Sq cm 4 2019 10:00 AM   Undermined no 2019 10:00 AM   Tunneling no 2019 10:00 AM   Description joya montaño 2019 10:00 AM     Drainage: Moderate  Thickness:  Full  Duration of Need: 30  Days Supply: 30   Start Date: 19  Starter Kit: Ancillary Kit (saline, gloves, gauze)  Qualifying wound/Debridement Yes      Dressing Type Brand Size Number of  "pieces Frequency of change   Primary Medipore+pad 3M [id number] 30 Daily    Xeroform   5\"x9\" 30 Daily    Gauze  4\"x4\" 1 loaf Daily                           Secondary                                        Tape                          Note: If total out of pocket is more than $50.00 please contact the patient before processing order.     OK to forward to covered supplier.     Electronically Signed Physician: Monica Aaron CNP Date: 4/11/2019       "

## 2021-06-21 NOTE — PROGRESS NOTES
ASSESSMENT:  1. Fatigue, unspecified type  Update labs.  Discussed trial of stimulants or alternative SSRI changed to DSR I am  - HM2(CBC w/o Differential)  - Comprehensive Metabolic Panel  - Thyroid Stimulating Hormone (TSH)  - Vitamin B12    2. Flu vaccine need  - Influenza High Dose, Seasonal    3. Hypothyroidism due to acquired atrophy of thyroid  Recheck    4. Major depressive disorder with single episode, in full remission (H)  Recheck    5. Prostate cancer (H)  Reviewed treatment and chemotherapy.  Reviewed metastatic disease.  Now quiesced sent    6. Muscle cramps  Suspect primary nerve inflammation with secondary muscle spasm.  Rule out uric acid contribution.  Discussed trial of neuroleptics or anticonvulsants  - Uric Acid    7. Neuropathy (H)  Begin with amitriptyline.  Trial of anticonvulsants discussed.  1 B12 shot  - amitriptyline (ELAVIL) 10 MG tablet; Take 1 tablet (10 mg total) by mouth at bedtime.  Dispense: 30 tablet; Refill: 11  - topiramate (TOPAMAX) 25 MG tablet; Take 1 tablet (25 mg total) by mouth 2 (two) times a day.  Dispense: 60 tablet; Refill: 11  - cyanocobalamin injection 1,000 mcg; Inject 1 mL (1,000 mcg total) into the shoulder, thigh, or buttocks every 30 (thirty) days.    8.  Statin myopathy.  Can resume    PLAN:  Patient Instructions   We can use Topiramate for nerve irritation and muscle cramps, and also help lose weight, and help sleep    We could change the escitalopram to a more activating anti depressant med that also works on nerve irritation, like Cymbalta    We could use a sleeping pill at night to help cramps, like amitriptyline or topiramate    We could use a muscle relaxant at night and as needed during day to help the symptoms     We could use a stimulant drug in the Ritalin family    We can use sleeping pills like Ambien      Amitriptyline 10 mgs at bedtime to keep you asleep.  Range is 10-50 mgs    topiramate 25 mgs at bedtime to soothe the nerve irritation first, and  make sleepy second            There is a new shingles shot which was released in March 2018.  It is recommended for all adults over 50.    All adults over 50 are assumed to have had chickenpox as children.  The recommendation is for all adults over 50 to have the vaccine, even if they have previously had shingles, even if they have previously had the older shot, Zostavax, even if their immune system is good, even if their immune system is compromised.    The vaccine is categorized as a medication.  Therefore, the shot is covered by insurance through your retail pharmacy where the co-pay and reimbursement will be handled correctly.  If it becomes recharacterized as a vaccine, we may be able to give it in this office in the future        Orders Placed This Encounter   Procedures     Influenza High Dose, Seasonal     HM2(CBC w/o Differential)     Comprehensive Metabolic Panel     Thyroid Stimulating Hormone (TSH)     Uric Acid     Vitamin B12     There are no discontinued medications.  Administrations This Visit     cyanocobalamin injection 1,000 mcg     Admin Date Action Dose Route Administered By             10/17/2018 Given 1000 mcg Intramuscular Zinmin M Htay, CMA                        Return if symptoms worsen or fail to improve, for and contact me through [facility], with an update on the effects of the medications.      CHIEF COMPLAINT:  Chief Complaint   Patient presents with     Dry Eye     really itchy     Medication Management     Duscuss med,      Spasms     muscle cramp     Immunizations     flu shot       HISTORY OF PRESENT ILLNESS:  Justin is a 72 y.o. male presenting to the clinic today with complaints of dry eyes, fatigue, and muscle cramps.     Fatigue: He is fatigued and feels the need to sleep more than he would like. He notes he has some trouble sleeping, but it depends where he is. This is partially due to having dogs on his bed and his wife snoring, so when he is not with his wife, he sleeps better.  "He has never tried taking a stimulant in the past and would be tentative to start one. He typically has more trouble falling back asleep when he wakes up during the night than going to sleep initially. He feels like he is losing blocks of the day due to fatigue. He does not snore or have sleep apnea. He was blaming the fatigue on his chemotherapy, but he feels like he should be getting over that by this point as he is a couple of years removed.     Cramps: He develops muscle cramps, especially at night, and these are new since undergoing chemotherapy. He notices the cramps the most in his torso, legs, and hands. He gets the hand cramps while reading in bed and in his abdomen if he tries to roll over in bed. He will feel the need to get out of bed and walk around to relieve leg cramps when he gets them. He has taken a muscle relaxant once in the past, but he did not like it. He has never taken amitriptyline. He does not have burning or tingling in his extremities. The cramps tend to happen more if he is lying down or holding the steering wheel in his car for a long time.     Dry Eyes: He has dry eyes. Both the outsides of the eyes and the insides seem to itch. He has been seen by an eye doctor for this, but they have not been able to diagnose anything. He also has dry and itchy ears and eyelids, but he does not know of any other skin conditions. He has tried eye drops, including over the counter moistening drops, but they do not seem to help. He does not have seasonal allergies. He occasionally wakes up during the night with an extremely dry mouth.     Prostate Cancer: He is following with [facility] for his prostate cancer and has an appointment coming up this winter. He had six metastatic tumors at one point, but it is currently \"undetectable.\" He was treated with chemotherapy and Lupron. He inquires if chemotherapy can cause skin lesions to show up.     Hyperlipidemia: He stopped taking his statin about six " months ago due to sore joints while on chemotherapy. He has not restarted it and inquires if he should.     Depression: He is taking escitalopram for anxiety and depression. He thinks he has taken something else in the past.     Health Maintenance: He would like a flu shot. He will look into getting the Shingrix vaccine.     REVIEW OF SYSTEMS:   His weight has been going up a little bit, as it usually does for him in the winter. He thinks he has taken a medication for neuropathy in the past, but he really does not have any burning or tingling now. He has never tried a vitamin B12 shot. All other systems are negative.     PFSH:  He is leaving for [place] soon. He was thinking about selling his boat.     TOBACCO USE:  History   Smoking Status     Never Smoker   Smokeless Tobacco     Never Used     Comment: in college social smoker       VITALS:  Vitals:    10/17/18 1447   BP: 110/78   Patient Site: Left Arm   Patient Position: Sitting   Cuff Size: Adult Large   Pulse: 64   SpO2: 99%   Weight: 205 lb (93 kg)     Wt Readings from Last 3 Encounters:   10/17/18 205 lb (93 kg)   05/16/18 205 lb 14.4 oz (93.4 kg)   12/21/17 203 lb 12.8 oz (92.4 kg)     Body mass index is 27.8 kg/(m^2).    PHYSICAL EXAM:  Constitutional:  Reveals an alert, pleasant, talkative man. Affect appropriate.  Vitals:  Per nursing notes.  Oropharynx:  Without posterior nasal drainage or thrush.  Cardiac:  Regular rate and rhythm without murmurs, rubs, or gallops. Legs without edema. Palpation of the radial pulse regular.  Lungs: Clear.  Respiratory effort normal.  Neurologic:  Cranial nerves II-XII intact.     Psychiatric:  Mood appropriate, memory intact.     MEDICATIONS:  Current Outpatient Prescriptions   Medication Sig Dispense Refill     alendronate (FOSAMAX) 70 MG tablet Take 1 tablet (70 mg total) by mouth every 7 days. 12 tablet 3     aspirin 81 MG EC tablet Take 81 mg by mouth daily.       escitalopram oxalate (LEXAPRO) 10 MG tablet Take 1  tablet (10 mg total) by mouth daily. 90 tablet 3     levothyroxine (SYNTHROID, LEVOTHROID) 150 MCG tablet Take 1 tablet (150 mcg total) by mouth daily. 90 tablet 3     OMEGA-3/DHA/EPA/FISH OIL (FISH OIL-OMEGA-3 FATTY ACIDS) 300-1,000 mg capsule Take 1 g by mouth daily.       rosuvastatin (CRESTOR) 5 MG tablet Take 1 tablet (5 mg total) by mouth daily. 90 tablet 3     amitriptyline (ELAVIL) 10 MG tablet Take 1 tablet (10 mg total) by mouth at bedtime. 30 tablet 11     febuxostat (ULORIC) 40 mg tablet Take 1 tablet (40 mg total) by mouth daily. 90 tablet 3     topiramate (TOPAMAX) 25 MG tablet Take 1 tablet (25 mg total) by mouth 2 (two) times a day. 60 tablet 11     Current Facility-Administered Medications   Medication Dose Route Frequency Provider Last Rate Last Dose     cyanocobalamin injection 1,000 mcg  1,000 mcg Intramuscular Q30 Days Jalil Manjarrez MD   1,000 mcg at 10/17/18 1552       ADDITIONAL HISTORY SUMMARIZED (2): Reviewed May 2015 note regarding medications.   DECISION TO OBTAIN EXTRA INFORMATION (1): None.   RADIOLOGY TESTS (1): None.  LABS (1): Labs from 5/16/2018 reviewed. Labs ordered.   MEDICINE TESTS (1): None.  INDEPENDENT REVIEW (2 each): None.     The visit lasted a total of 36 minutes face to face with the patient. Over 50% of the time was spent counseling and educating the patient about his fatigue, dry eyes, and cramps.    ITerrence, am scribing for and in the presence of, Dr. Manjarrez.    I, Dr. Manjarrez, personally performed the services described in this documentation, as scribed by Terrence Gaston in my presence, and it is both accurate and complete.    Total data points: 3

## 2021-06-23 NOTE — TELEPHONE ENCOUNTER
Refill Approved    Rx renewed per Medication Renewal Policy. Medication was last renewed on 4/30/18. 4/30/18. 12/13/18. 10/17/18    Vesna Sims, [facility] Triage/Med Refill 2/6/2019     Requested Prescriptions   Pending Prescriptions Disp Refills     escitalopram oxalate (LEXAPRO) 10 MG tablet 90 tablet 3     Sig: Take 1 tablet (10 mg total) by mouth daily.    SSRI Refill Protocol  Passed - 2/6/2019  8:49 AM       Passed - PCP or prescribing provider visit in last year    Last office visit with prescriber/PCP: 10/17/2018 Jalil Manjarrez MD OR same dept: 10/17/2018 Jalil Manjarrez MD OR same specialty: 10/17/2018 Jalil Manjarrez MD  Last physical: 5/16/2018 Last MTM visit: Visit date not found   Next visit within 3 mo: Visit date not found  Next physical within 3 mo: Visit date not found  Prescriber OR PCP: Jalil Manjarrez MD  Last diagnosis associated with med order: 1. Major depression, single episode  - escitalopram oxalate (LEXAPRO) 10 MG tablet; Take 1 tablet (10 mg total) by mouth daily.  Dispense: 90 tablet; Refill: 3    2. Lipid disorder  - rosuvastatin (CRESTOR) 5 MG tablet; Take 1 tablet (5 mg total) by mouth daily.  Dispense: 90 tablet; Refill: 3    3. Neuropathy (H)  - topiramate (TOPAMAX) 25 MG tablet; Take 1 tablet (25 mg total) by mouth 2 (two) times a day.  Dispense: 60 tablet; Refill: 11    If protocol passes may refill for 12 months if within 3 months of last provider visit (or a total of 15 months).             levothyroxine (SYNTHROID, LEVOTHROID) 150 MCG tablet 90 tablet 3     Sig: Take 1 tablet (150 mcg total) by mouth daily.    Thyroid Hormones Protocol Passed - 2/6/2019  8:49 AM       Passed - Provider visit in past 12 months or next 3 months    Last office visit with prescriber/PCP: 10/17/2018 Jalil Manjarrez MD OR same dept: 10/17/2018 Jalil Manjarrez MD OR same specialty: 10/17/2018 Jalil Manjarrez MD  Last physical: 5/16/2018 Last MTM visit: Visit  date not found   Next visit within 3 mo: Visit date not found  Next physical within 3 mo: Visit date not found  Prescriber OR PCP: Jalil Manjarrez MD  Last diagnosis associated with med order: 1. Major depression, single episode  - escitalopram oxalate (LEXAPRO) 10 MG tablet; Take 1 tablet (10 mg total) by mouth daily.  Dispense: 90 tablet; Refill: 3    2. Lipid disorder  - rosuvastatin (CRESTOR) 5 MG tablet; Take 1 tablet (5 mg total) by mouth daily.  Dispense: 90 tablet; Refill: 3    3. Neuropathy (H)  - topiramate (TOPAMAX) 25 MG tablet; Take 1 tablet (25 mg total) by mouth 2 (two) times a day.  Dispense: 60 tablet; Refill: 11    If protocol passes may refill for 12 months if within 3 months of last provider visit (or a total of 15 months).            Passed - TSH on file in past 12 months for patient age 12 & older    TSH   Date Value Ref Range Status   10/17/2018 0.46 0.30 - 5.00 uIU/mL Final                   rosuvastatin (CRESTOR) 5 MG tablet 90 tablet 3     Sig: Take 1 tablet (5 mg total) by mouth daily.    Statins Refill Protocol (Hmg CoA Reductase Inhibitors) Passed - 2/6/2019  8:49 AM       Passed - PCP or prescribing provider visit in past 12 months     Last office visit with prescriber/PCP: 10/17/2018 Jalil Manjarrez MD OR same dept: 10/17/2018 Jalil Manjarrez MD OR same specialty: 10/17/2018 Jalil Manjarrez MD  Last physical: 5/16/2018 Last MTM visit: Visit date not found   Next visit within 3 mo: Visit date not found  Next physical within 3 mo: Visit date not found  Prescriber OR PCP: Jalil Manjarrez MD  Last diagnosis associated with med order: 1. Major depression, single episode  - escitalopram oxalate (LEXAPRO) 10 MG tablet; Take 1 tablet (10 mg total) by mouth daily.  Dispense: 90 tablet; Refill: 3    2. Lipid disorder  - rosuvastatin (CRESTOR) 5 MG tablet; Take 1 tablet (5 mg total) by mouth daily.  Dispense: 90 tablet; Refill: 3    3. Neuropathy (H)  - topiramate (TOPAMAX)  25 MG tablet; Take 1 tablet (25 mg total) by mouth 2 (two) times a day.  Dispense: 60 tablet; Refill: 11    If protocol passes may refill for 12 months if within 3 months of last provider visit (or a total of 15 months).             topiramate (TOPAMAX) 25 MG tablet 60 tablet 11     Sig: Take 1 tablet (25 mg total) by mouth 2 (two) times a day.    Topiramate Refill Protocol  Passed - 2/6/2019  8:49 AM       Passed - Bicarbonate/Electrolyte panel in last 12 months    Sodium   Date Value Ref Range Status   10/17/2018 139 136 - 145 mmol/L Final     Potassium   Date Value Ref Range Status   10/17/2018 4.4 3.5 - 5.0 mmol/L Final     Chloride   Date Value Ref Range Status   10/17/2018 107 98 - 107 mmol/L Final     CO2   Date Value Ref Range Status   10/17/2018 24 22 - 31 mmol/L Final               Passed - PCP or prescribing provider visit in last 12 months or next 3 months    Last office visit with prescriber/PCP: 10/17/2018 Jalil Manjarrez MD OR same dept: 10/17/2018 Jalil Manjarrez MD OR same specialty: 10/17/2018 Jalil Manjarrez MD  Last physical: 5/16/2018 Last MTM visit: Visit date not found   Next visit within 3 mo: Visit date not found  Next physical within 3 mo: Visit date not found  Prescriber OR PCP: Jalil Manjarrez MD  Last diagnosis associated with med order: 1. Major depression, single episode  - escitalopram oxalate (LEXAPRO) 10 MG tablet; Take 1 tablet (10 mg total) by mouth daily.  Dispense: 90 tablet; Refill: 3    2. Lipid disorder  - rosuvastatin (CRESTOR) 5 MG tablet; Take 1 tablet (5 mg total) by mouth daily.  Dispense: 90 tablet; Refill: 3    3. Neuropathy (H)  - topiramate (TOPAMAX) 25 MG tablet; Take 1 tablet (25 mg total) by mouth 2 (two) times a day.  Dispense: 60 tablet; Refill: 11    If protocol passes may refill for 12 months if within 3 months of last provider visit (or a total of 15 months).            Passed - Serum creatinine in last 12 months    Creatinine   Date Value  Ref Range Status   10/17/2018 0.72 0.70 - 1.30 mg/dL Final

## 2021-06-23 NOTE — TELEPHONE ENCOUNTER
Fax received from [employer], they have started the Prior Authorization Process via Cover My Meds    CoverMyMeds Key: [id number]    Medication Name: Topiramate    Insurance Plan: Medicare Part D  PBM: [employer]  Patient ID: not provided on fax    Please complete the PA process

## 2021-06-23 NOTE — TELEPHONE ENCOUNTER
Patient requesting refill sent to [facility] pharmacy     Refill Request  Did you contact pharmacy: Yes  Medication name:   Requested Prescriptions     Pending Prescriptions Disp Refills     escitalopram oxalate (LEXAPRO) 10 MG tablet 90 tablet 3     Sig: Take 1 tablet (10 mg total) by mouth daily.     levothyroxine (SYNTHROID, LEVOTHROID) 150 MCG tablet 90 tablet 3     Sig: Take 1 tablet (150 mcg total) by mouth daily.     rosuvastatin (CRESTOR) 5 MG tablet 90 tablet 3     Sig: Take 1 tablet (5 mg total) by mouth daily.     topiramate (TOPAMAX) 25 MG tablet 60 tablet 11     Sig: Take 1 tablet (25 mg total) by mouth 2 (two) times a day.     Who prescribed the medication: Jalil Manjarrez MD  Pharmacy Name and Location: [facility] pharmacy [facility]   Is patient out of medication: No patient is requesting all medications to match up together and will fill together  Patient notified refills processed in 72 hours:  no  Okay to leave a detailed message: yes

## 2021-06-23 NOTE — TELEPHONE ENCOUNTER
"We would recommend against testosterone treatment.  He is already on androgen deprivation treatment with Lupron for metastatic prostate cancer.  We reviewed oncology notes from [facility] which suggest that he will continue on that treatment.  Lupron would likely interfere with the effectiveness of the testosterone treatment, and testosterone is also contraindicated in prostate cancer per package labeling.      Evidence for testosterone use in prostate cancer patients is quite limited. At this time, there are no definitive guidelines about the appropriateness of therapy. The 2014 positional statment from the American Urological Association summarizes most of what I have found: \"Current evidence does not provide any definitive answers regarding the risks of testosterone therapy on prostate cancer and cardiovascular disease, and patients should be so informed.\" If testosterone therapy is considered, it is recommended only after curative treatment for prostate cancer. PSA monitoring is recommended before and 1 month after initiation of therapy to watch for cancer recurrence, then every 6 months thereafter. (Routing comment)   Francisco J Rowley, PharmD IV Candidate 2019   "

## 2021-06-23 NOTE — TELEPHONE ENCOUNTER
Medication Request  Medication name: Testosterone medication  Pharmacy Name and Location: [facility]   Reason for request: Requesting for increase in sexual functioning. Patient states after prostate cancer he is requesting Testosterone hormone. Spouse uses a testosterone application, patient is interested in that?   When did you use medication last?:  N/a   Okay to leave a detailed message: no, Requesting my chart message.

## 2021-06-24 NOTE — TELEPHONE ENCOUNTER
Okdeepti for Cialis prescription    Previously had refused testosterone supplements but Cialis is okay

## 2021-06-24 NOTE — TELEPHONE ENCOUNTER
This can be sent to a [facility], without insurance this is on their $4 list, cash price.  He would be able to get this dose for about $4 per month.  If we would like him to try this medication I would recommend going that route as it may or may not be covered through his insurance even with the addition of an appeal.     Would recommend to send the prescription to [facility] that is convenient for the patient, and in the comments of the prescription ask them to run through the $4 list.

## 2021-06-24 NOTE — TELEPHONE ENCOUNTER
Patient is wanting this done ASAP as he is at the pharmacy and wants to  now.    Medication Question or Clarification  Who is calling: Patient  What medication are you calling about?: tadalafil (CIALIS) 20 MG tablet  What dose do you take?: 20 mg  How often are you taking the medication?: PRN  Who prescribed the medication?: Jalil Manjarrez MD  What is your question/concern?: Patient calling to report medication is not covered and sildenafil is cheaper.  Patient is wanting this RX to be sent in to pharmacy as alternative.  Pharmacy: [id number]  Okay to leave a detailed message?: Yes  Site CMT - Please call the pharmacy to obtain any additional needed information.

## 2021-06-24 NOTE — TELEPHONE ENCOUNTER
Patient notified. He was in the car and meeting friends. He will call us back when he is done and give us the location of a near by [facility]

## 2021-06-24 NOTE — TELEPHONE ENCOUNTER
YAATCB.  Please relay Pharm D message below and get the name/location of a [facility] he would like script sent to.

## 2021-06-24 NOTE — TELEPHONE ENCOUNTER
Spoke with pt to get the name of a [facility], pt stated he hates [facility] pharmacy, and has chosen to just take 25 mg Topiramate 25 mg daily to make his medication last longer.    Pt was also wondering if PCP would prescribe cialis for him.  CA explained that PCP recommended he contact his urologist regarding those kind of medications, patient declined to do so and said PCP can prescribe it.

## 2021-06-25 ENCOUNTER — COMMUNICATION - HEALTHEAST (OUTPATIENT)
Dept: INTERNAL MEDICINE | Facility: CLINIC | Age: 76
End: 2021-06-25

## 2021-06-25 DIAGNOSIS — F32.9 MAJOR DEPRESSION, SINGLE EPISODE: ICD-10-CM

## 2021-06-25 DIAGNOSIS — G62.9 NEUROPATHY: ICD-10-CM

## 2021-06-25 NOTE — TELEPHONE ENCOUNTER
escitalopram oxalate (LEXAPRO) 10 MG tablet [[id number]]    Electronically signed by: Jalil Manjarrez MD on 04/13/20 1535 Status: Discontinued   Ordering user: Jalil Manjarrez MD 04/13/20 1535 Authorized by: Jalil Manjarrez MD   Frequency: DAILY 04/13/20 - 10/12/20  Discontinued by: Jalil Manjarrez MD 10/12/20 1525 [Therapy completed]   Diagnoses   Major depression, single episode [F32.9]   Neuropathy [G62.9]

## 2021-06-26 RX ORDER — ESCITALOPRAM OXALATE 10 MG/1
TABLET ORAL
Qty: 90 TABLET | Refills: 0 | Status: SHIPPED | OUTPATIENT
Start: 2021-06-26 | End: 2021-08-19
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
## 2021-07-03 NOTE — ADDENDUM NOTE
Addendum Note by Americo Kitchen LPN at 4/11/2019 10:00 AM     Author: Americo Kitchen LPN Service: -- Author Type: Licensed Nurse    Filed: 4/11/2019 11:22 AM Encounter Date: 4/11/2019 Status: Signed    : Americo Kitchen LPN (Licensed Nurse)    Addended by: AMERICO KITCHEN on: 4/11/2019 11:22 AM        Modules accepted: Orders

## 2021-07-03 NOTE — ADDENDUM NOTE
Addendum Note by Renee Cohen RN at 2/27/2017  5:13 PM     Author: Renee Cohen RN Service: -- Author Type: Registered Nurse    Filed: 2/27/2017  5:13 PM Encounter Date: 2/27/2017 Status: Signed    : Renee Cohen RN (Registered Nurse)    Addended by: RENEE COHEN on: 2/27/2017 05:13 PM        Modules accepted: Orders

## 2021-07-03 NOTE — ADDENDUM NOTE
Addendum Note by Turner Cody MD at 12/21/2017  3:29 PM     Author: Turner Cody MD Service: -- Author Type: Physician    Filed: 12/21/2017  3:29 PM Encounter Date: 12/21/2017 Status: Signed    : Turenr Cody MD (Physician)    Addended by: TURNER CODY on: 12/21/2017 03:29 PM        Modules accepted: Orders

## 2021-07-03 NOTE — ADDENDUM NOTE
Addendum Note by Jacque Stallings CMA at 5/19/2017  3:58 PM     Author: Jacque Stallings CMA Service: -- Author Type: Certified Medical Assistant    Filed: 5/19/2017  3:58 PM Encounter Date: 5/17/2017 Status: Signed    : Jacque Stallings CMA (Certified Medical Assistant)    Addended by: JACQUE STALLINGS on: 5/19/2017 03:58 PM        Modules accepted: Orders

## 2021-07-07 NOTE — TELEPHONE ENCOUNTER
RN cannot approve Refill Request    RN can NOT refill this medication medication not on med list. Last office visit: 4/10/2019 Jalil Manjarrez MD Last Physical: 5/16/2018 Last MTM visit: Visit date not found Last visit same specialty: 4/10/2019 Jalil Manjarrez MD.  Next visit within 3 mo: Visit date not found  Next physical within 3 mo: Visit date not found      Shivani Crouch, [facility] Triage/Med Refill 6/25/2021    Requested Prescriptions   Pending Prescriptions Disp Refills     escitalopram oxalate (LEXAPRO) 10 MG tablet [Pharmacy Med Name: Escitalopram Oxalate Oral Tablet 10 MG] 90 tablet 0     Sig: TAKE ONE TABLET BY MOUTH ONE TIME DAILY       SSRI Refill Protocol  Passed - 6/25/2021  5:10 PM        Passed - PCP or prescribing provider visit in last year     Last office visit with prescriber/PCP: 4/10/2019 Jalil Manjarrez MD OR same dept: Visit date not found OR same specialty: 4/10/2019 Jalil Manjarrez MD  Last physical: 5/16/2018 Last MTM visit: Visit date not found   Next visit within 3 mo: Visit date not found  Next physical within 3 mo: Visit date not found  Prescriber OR PCP: Jalil Manjarrez MD  Last diagnosis associated with med order: 1. Major depression, single episode  - escitalopram oxalate (LEXAPRO) 10 MG tablet [Pharmacy Med Name: Escitalopram Oxalate Oral Tablet 10 MG]; TAKE ONE TABLET BY MOUTH ONE TIME DAILY   Dispense: 90 tablet; Refill: 0    2. Neuropathy  - escitalopram oxalate (LEXAPRO) 10 MG tablet [Pharmacy Med Name: Escitalopram Oxalate Oral Tablet 10 MG]; TAKE ONE TABLET BY MOUTH ONE TIME DAILY   Dispense: 90 tablet; Refill: 0    If protocol passes may refill for 12 months if within 3 months of last provider visit (or a total of 15 months).

## 2021-08-18 DIAGNOSIS — G62.9 NEUROPATHY: ICD-10-CM

## 2021-08-18 DIAGNOSIS — F32.5 MAJOR DEPRESSIVE DISORDER WITH SINGLE EPISODE, IN REMISSION (H): Primary | ICD-10-CM

## 2021-08-18 DIAGNOSIS — E03.4 HYPOTHYROIDISM DUE TO ACQUIRED ATROPHY OF THYROID: ICD-10-CM

## 2021-08-18 DIAGNOSIS — F32.9 MAJOR DEPRESSION, SINGLE EPISODE: ICD-10-CM

## 2021-08-19 RX ORDER — LEVOTHYROXINE SODIUM 150 UG/1
TABLET ORAL
Qty: 90 TABLET | Refills: 0 | Status: SHIPPED | OUTPATIENT
Start: 2021-08-19 | End: 2021-08-23

## 2021-08-19 RX ORDER — ESCITALOPRAM OXALATE 10 MG/1
TABLET ORAL
Qty: 90 TABLET | Refills: 0 | Status: SHIPPED | OUTPATIENT
Start: 2021-08-19 | End: 2021-08-23

## 2021-08-19 NOTE — TELEPHONE ENCOUNTER
Pending Prescriptions:                       Disp   Refills    levothyroxine (SYNTHROID/LEVOTHROID) 150 *90 tab*0            Sig: [LEVOTHYROXINE (SYNTHROID, LEVOTHROID) 150 MCG           TABLET] TAKE ONE TABLET BY MOUTH ONE TIME DAILY    escitalopram (LEXAPRO) 10 MG tablet       90 tab*0            Sig: [ESCITALOPRAM OXALATE (LEXAPRO) 10 MG TABLET]           TAKE ONE TABLET BY MOUTH ONE TIME DAILY    Last office visit was 10/12/20 with Dr. Manjarrez.      Patient overdue for thyroid lab work.      Patient has a video visit with Dr. Manjarrez on 8/23/21.

## 2021-08-23 ENCOUNTER — VIRTUAL VISIT (OUTPATIENT)
Dept: INTERNAL MEDICINE | Facility: CLINIC | Age: 76
End: 2021-08-23
Payer: COMMERCIAL

## 2021-08-23 DIAGNOSIS — F32.5 MAJOR DEPRESSIVE DISORDER WITH SINGLE EPISODE, IN REMISSION (H): ICD-10-CM

## 2021-08-23 DIAGNOSIS — H40.002 GLAUCOMA SUSPECT, LEFT: ICD-10-CM

## 2021-08-23 DIAGNOSIS — C61 PROSTATE CANCER (H): ICD-10-CM

## 2021-08-23 DIAGNOSIS — M89.9 DISORDER OF BONE AND CARTILAGE: ICD-10-CM

## 2021-08-23 DIAGNOSIS — E55.9 VITAMIN D DEFICIENCY: ICD-10-CM

## 2021-08-23 DIAGNOSIS — E03.4 HYPOTHYROIDISM DUE TO ACQUIRED ATROPHY OF THYROID: ICD-10-CM

## 2021-08-23 DIAGNOSIS — K52.831 COLLAGENOUS COLITIS: ICD-10-CM

## 2021-08-23 DIAGNOSIS — G62.9 NEUROPATHY: ICD-10-CM

## 2021-08-23 DIAGNOSIS — Z00.00 ENCOUNTER FOR MEDICARE ANNUAL WELLNESS EXAM: Primary | ICD-10-CM

## 2021-08-23 DIAGNOSIS — M94.9 DISORDER OF BONE AND CARTILAGE: ICD-10-CM

## 2021-08-23 DIAGNOSIS — E78.00 PURE HYPERCHOLESTEROLEMIA: ICD-10-CM

## 2021-08-23 DIAGNOSIS — Z11.59 ENCOUNTER FOR HEPATITIS C SCREENING TEST FOR LOW RISK PATIENT: ICD-10-CM

## 2021-08-23 DIAGNOSIS — Z79.83 LONG TERM (CURRENT) USE OF BISPHOSPHONATES: ICD-10-CM

## 2021-08-23 PROBLEM — M10.9 GOUT: Status: RESOLVED | Noted: 2021-08-23 | Resolved: 2021-08-23

## 2021-08-23 PROBLEM — M10.9 GOUT: Status: ACTIVE | Noted: 2021-08-23

## 2021-08-23 PROCEDURE — 99397 PER PM REEVAL EST PAT 65+ YR: CPT | Mod: 95 | Performed by: INTERNAL MEDICINE

## 2021-08-23 RX ORDER — LEVOTHYROXINE SODIUM 150 UG/1
TABLET ORAL
Qty: 90 TABLET | Refills: 3 | Status: SHIPPED | OUTPATIENT
Start: 2021-08-23 | End: 2023-04-12

## 2021-08-23 RX ORDER — ESCITALOPRAM OXALATE 10 MG/1
TABLET ORAL
Qty: 90 TABLET | Refills: 3 | Status: SHIPPED | OUTPATIENT
Start: 2021-08-23 | End: 2022-12-02

## 2021-08-23 RX ORDER — ROSUVASTATIN CALCIUM 10 MG/1
TABLET, COATED ORAL
Qty: 90 TABLET | Refills: 3 | Status: SHIPPED | OUTPATIENT
Start: 2021-08-23 | End: 2022-08-23

## 2021-08-23 RX ORDER — ALENDRONATE SODIUM 70 MG/1
TABLET ORAL
Qty: 12 TABLET | Refills: 3 | Status: SHIPPED | OUTPATIENT
Start: 2021-08-23 | End: 2022-08-23

## 2021-08-23 ASSESSMENT — ACTIVITIES OF DAILY LIVING (ADL): CURRENT_FUNCTION: NO ASSISTANCE NEEDED

## 2021-08-23 NOTE — PROGRESS NOTES
ANNUAL WELLNESS VISIT--Video    Justin is a 75 year old male contacting the clinic today via video, who will use the platform: [employer] for the visit.  Phone # for [employer], or if Amwell drops:   Telephone Information:   Mobile [phone]          Assessment and Plan:     DX: 1. Encounter for Medicare annual wellness exam  - REVIEW OF HEALTH MAINTENANCE PROTOCOL ORDERS    2. Hypothyroidism due to acquired atrophy of thyroid  - levothyroxine (SYNTHROID/LEVOTHROID) 150 MCG tablet; [LEVOTHYROXINE (SYNTHROID, LEVOTHROID) 150 MCG TABLET] TAKE ONE TABLET BY MOUTH ONE TIME DAILY  Dispense: 90 tablet; Refill: 3  - TSH; Future  - Comprehensive metabolic panel; Future    3. Prostate cancer (H)  Recent increasing PSA accompanied by suspicious lymphadenopathy on bone scan.  Plan for radiation therapy this fall.  Original surgery 1992    4. Pure hypercholesterolemia  - rosuvastatin (CRESTOR) 10 MG tablet; [ROSUVASTATIN (CRESTOR) 10 MG TABLET] TAKE ONE TABLET BY MOUTH ONE TIME DAILY  Dispense: 90 tablet; Refill: 3  - Lipid panel reflex to direct LDL Fasting; Future    5. Neuropathy  - escitalopram (LEXAPRO) 10 MG tablet; [ESCITALOPRAM OXALATE (LEXAPRO) 10 MG TABLET] TAKE ONE TABLET BY MOUTH ONE TIME DAILY  Dispense: 90 tablet; Refill: 3    6. Major depressive disorder with single episode, in remission (H)  - escitalopram (LEXAPRO) 10 MG tablet; [ESCITALOPRAM OXALATE (LEXAPRO) 10 MG TABLET] TAKE ONE TABLET BY MOUTH ONE TIME DAILY  Dispense: 90 tablet; Refill: 3  - Vitamin B12; Future    7. Glaucoma suspect, left    8. Osteopenia  Due for updated bone density especially on Lupron  - alendronate (FOSAMAX) 70 MG tablet; [ALENDRONATE (FOSAMAX) 70 MG TABLET] TAKE ONE TABLET BY MOUTH EVERY 7 DAYS  Dispense: 12 tablet; Refill: 3  - DEXA HIP/PELVIS/SPINE - Future; Future    9. Vitamin D deficiency  - Vitamin D Deficiency; Future    10. Long term (current) use of bisphosphonates   - DEXA HIP/PELVIS/SPINE - Future; Future    11. Encounter  for hepatitis C screening test for low risk patient  - Hepatitis C antibody; Future    12. Collagenous colitis  No further diarrhea.  Reviewed colonoscopy 2017    Preventive Care Assessed: Reviewed vaccinations and cancer screening    Patient Instructions       Patient Education   Personalized Prevention Plan  You are due for the preventive services outlined below.  Your care team is available to assist you in scheduling these services.  If you have already completed any of these items, please share that information with your care team to update in your medical record.  Health Maintenance Due   Topic Date Due     ANNUAL REVIEW OF HM ORDERS  Never done     Depression Action Plan  Never done     COVID-19 Vaccine (1) Never done     Hepatitis C Screening  Never done     Annual Wellness Visit  05/16/2019     Diptheria Tetanus Pertussis (DTAP/TDAP/TD) Vaccine (2 - Td or Tdap) 08/25/2021         Refill meds    Update labs    Bone density    Covid booster shot     Primary Covid vaccine from [place]     Return in about 1 year (around 8/23/2022) for Annual Wellness Visit.    The patient's current medical problems were reviewed.    I have had an Advance Directives discussion with the patient.  The following are part of a depression follow up plan for the patient:  mental health care management and management of mental health treatment        The following health maintenance items are reviewed in Epic and correct as of today:  Health Maintenance Due   Topic Date Due     HEPATITIS C SCREENING  Never done     COVID-19 Vaccine (2 - Moderna 2-dose series) 05/11/2021     DTAP/TDAP/TD IMMUNIZATION (2 - Td or Tdap) 08/25/2021       Appropriate preventive services were discussed with this patient, including applicable screening as appropriate for cardiovascular disease, diabetes, osteopenia/osteoporosis, and glaucoma.  As appropriate for age/gender, discussed screening for colorectal cancer, prostate cancer, breast cancer, and  cervical cancer. Checklist reviewing preventive services available has been given to the patient.    Reviewed patients plan of care and provided an AVS. The Basic Care Plan for Justin meets the Care Plan requirement. This Care Plan has been established and reviewed with the Patient, and printed in the AVS.    The following health maintenance schedule was reviewed with the patient and provided in printed form in the after visit summary:   Health Maintenance   Topic Date Due     HEPATITIS C SCREENING  Never done     COVID-19 Vaccine (2 - Moderna 2-dose series) 05/11/2021     DTAP/TDAP/TD IMMUNIZATION (2 - Td or Tdap) 08/25/2021     INFLUENZA VACCINE (1) 09/01/2021     FALL RISK ASSESSMENT  10/12/2021     PHQ-9  02/23/2022     MEDICARE ANNUAL WELLNESS VISIT  08/23/2022     ANNUAL REVIEW OF HM ORDERS  08/23/2022     ADVANCE CARE PLANNING  08/23/2026     DEPRESSION ACTION PLAN  Completed     Pneumococcal Vaccine: 65+ Years  Completed     ZOSTER IMMUNIZATION  Completed     IPV IMMUNIZATION  Aged Out     MENINGITIS IMMUNIZATION  Aged Out     HEPATITIS B IMMUNIZATION  Aged Out        Subjective:   Justin is a 75 year old male contacting the clinic via video today for an Annual Wellness Visit.    CHIEF COMPLAINT:  Chief Complaint   Patient presents with     Physical     AWV       HPI: Recently had follow-up at [facility] in April.  PET scan showed increasing lymphadenopathy in PSA rising after years of being undetectable.  Given Lupron shot and plan for radiation in the fall    Mood and neuropathy well controlled on Lexapro    Collagenous colitis 2017 by colonoscopy without recurrence    Review of Systems: No diarrhea.  Stable weight.  Good mood.  Good sleep    Patient Care Team:  Jalil Manjarrez MD as PCP - General (Internal Medicine)  Turner Sahni MD, MD as MD (Hematology & Oncology)  Jalil Manjarrez MD as Assigned PCP     Patient Active Problem List   Diagnosis     Benign Adenomatous Polyp Of The Large  Intestine     Major depressive disorder, single episode     Osteopenia     Prostate cancer (H)     Vitamin B12 Deficiency     Vitamin D Deficiency     Lipid disorder     Hypothyroidism due to acquired atrophy of thyroid     Gout     Primary insomnia     Male Erectile Disorder     Neuropathy     Osteoarthritis     Sinus Bradycardia     Urinary incontinence     Glaucoma suspect, left     Collagenous colitis     Past Medical History:   Diagnosis Date     Gout      Hyperlipidemia      Hypothyroidism      Prostate cancer (H)       Past Surgical History:   Procedure Laterality Date     APPENDECTOMY       C APPENDECTOMY      Description: Appendectomy;  Recorded: 03/17/2008;     C REMV PROSTATE,RETROPUB,RAD,LTD NODES      Description: Prostatectomy Retropubic Radical With Lymph Node Biopsy(S);  Recorded: 03/25/2007;     CATARACT EXTRACTION       COLONOSCOPY  12/11/2017     HERNIA REPAIR       PROSTATE SURGERY  1992      Family History   Problem Relation Age of Onset     Multiple Sclerosis Mother      Heart Disease Father      Aortic aneurysm Father      Cerebrovascular Disease Sister      No Known Problems Sister      No Known Problems Daughter      No Known Problems Daughter       Social History     Socioeconomic History     Marital status: Single     Spouse name: Not on file     Number of children: 2     Years of education: Not on file     Highest education level: Not on file   Occupational History     Not on file   Tobacco Use     Smoking status: Never Smoker     Smokeless tobacco: Never Used     Tobacco comment: in college social smoker   Substance and Sexual Activity     Alcohol use: Yes     Alcohol/week: 10.0 - 12.0 standard drinks     Drug use: No     Sexual activity: Never   Other Topics Concern     Not on file   Social History Narrative     of [employer] in [place], now retired        Lives with a significant other.  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        [place]        [facility]     Social Determinants of Health     Financial Resource Strain:      Difficulty of Paying Living Expenses:    Food Insecurity:      Worried About Running Out of Food in the Last Year:      Ran Out of Food in the Last Year:    Transportation Needs:      Lack of Transportation (Medical):      Lack of Transportation (Non-Medical):    Physical Activity:      Days of Exercise per Week:      Minutes of Exercise per Session:    Stress:      Feeling of Stress :    Social Connections:      Frequency of Communication with Friends and Family:      Frequency of Social Gatherings with Friends and Family:      Attends Worship Services:      Active Member of Clubs or Organizations:      Attends Club or Organization Meetings:      Marital Status:    Intimate Partner Violence:      Fear of Current or Ex-Partner:      Emotionally Abused:      Physically Abused:      Sexually Abused:       Social History     Social History Narrative     of [employer] in [place], now retired        Lives with a significant other.  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       escitalopram (LEXAPRO) 10 MG tablet [ESCITALOPRAM OXALATE (LEXAPRO) 10 MG TABLET] TAKE ONE TABLET BY MOUTH ONE TIME DAILY 90 tablet 3     latanoprost (XALATAN) 0.005 % ophthalmic solution [LATANOPROST (XALATAN) 0.005 % OPHTHALMIC SOLUTION]        levothyroxine (SYNTHROID/LEVOTHROID) 150 MCG tablet [LEVOTHYROXINE (SYNTHROID, LEVOTHROID) 150 MCG TABLET] TAKE ONE TABLET BY MOUTH ONE TIME DAILY 90 tablet 3     rosuvastatin (CRESTOR) 10 MG tablet [ROSUVASTATIN (CRESTOR) 10 MG TABLET] TAKE ONE  "TABLET BY MOUTH ONE TIME DAILY 90 tablet 3      Objective:   There were no vitals taken for this visit. Estimated body mass index is 27.75 kg/m  as calculated from the following:    Height as of 4/11/19: 1.829 m (6').    Weight as of 4/11/19: 92.8 kg (204 lb 9.6 oz).    VisionScreening:  No exam data present     PHYSICAL EXAM   Constitutional:  Reveals Pleasant healthy alert man sitting on his boat in [facility] wearing a captain's hat.  Tanned and relaxed Psychiatric:  Alert and oriented.  Good mood    Prostate Specific Antigen Screen   Date Value Ref Range Status   10/13/2020 <0.1 0.00 - 6.50 ng/mL Final     No components found for: LIPID  No results found for: GLUCOSE    No results found for this or any previous visit (from the past 720 hour(s)).    No flowsheet data found.   Remembered 3 of 3 objects  Cognitive Screening   A Mini-Cog score of 0-2 suggests the possibility of dementia, score of 3-5 suggests no dementia    ROOMING STAFF:    Patient has been advised of split billing requirements and indicates understanding: Yes   Are you in the first 12 months of your Medicare coverage?  No    Healthy Habits:     In general, how would you rate your overall health?  Excellent    Frequency of exercise:  2-3 days/week    Duration of exercise:  15-30 minutes    Do you usually eat at least 4 servings of fruit and vegetables a day, include whole grains    & fiber and avoid regularly eating high fat or \"junk\" foods?  Yes    Taking medications regularly:  Yes    Medication side effects:  None    Ability to successfully perform activities of daily living:  No assistance needed    Home Safety:  No safety concerns identified    Hearing Impairment:  No hearing concerns    In the past 6 months, have you been bothered by leaking of urine? Yes    In general, how would you rate your overall mental or emotional health?  Excellent      PHQ-2 Total Score: 0    Additional concerns today:  No    Do you feel safe in your environment? " Yes    Have you ever done Advance Care Planning? (For example, a Health Directive, POLST, or a discussion with a medical provider or your loved ones about your wishes): Yes, advance care planning is on file.    Fall risk  Unable to complete due to virtual visit; need for additional assessment in future face-to-face visit      Do you have sleep apnea, excessive snoring or daytime drowsiness?: no    Reviewed and updated as needed this visit by clinical staff   Allergies  Meds  Problems             Review of external notes as documented above       Video Start Time: 10:43 AM  Video End time:  11:24 AM  Face to face plus orders: 41 minutes  Documentation time:  3 minutes    The visit lasted a total of 44 minutes     Patient has given verbal consent to a Video visit?  Yes  How would you like to obtain your AVS?  MyChart  Will anyone else be joining your video visit? No       Video-Visit Details  Type of service:  Video Visit  Originating Location (pt. Location): Home  Distant Location (provider location):    [facility]    Jalil Manjarrez MD  [facility]

## 2021-08-23 NOTE — PROGRESS NOTES
"Justin is a 75 year old male contacting the clinic today via video, who will use the platform: [employer] for the visit.  Phone # for [employer], or if Amwell drops:   Telephone Information:   Mobile [phone]          {Advised to have pill bottles ready and pen for instructions?  Yes}    ASSESSMENT:  {DX:735256::\" \"}    PLAN:  {Patient instructions and follow up:765469::\" \"}    CHIEF COMPLAINT:  Chief Complaint   Patient presents with     Physical     AWV         HISTORY OF PRESENT ILLNESS:  Justin is a 75 year old male contacting the clinic today via video for ***    REVIEW OF SYSTEMS:   ***      PFSH:  Social History     Social History Narrative    Preload  8/26/2013    Lives with a significant other.  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  There were no vitals taken for this visit. Estimated body mass index is 27.75 kg/m  as calculated from the following:    Height as of 4/11/19: 1.829 m (6').    Weight as of 4/11/19: 92.8 kg (204 lb 9.6 oz).    PHYSICAL EXAM:  (observations via Video)  ***    MEDICATIONS:   Current Outpatient Medications   Medication Sig Dispense Refill     alendronate (FOSAMAX) 70 MG tablet [ALENDRONATE (FOSAMAX) 70 MG TABLET] TAKE ONE TABLET BY MOUTH EVERY 7 DAYS 12 tablet 2     aspirin 81 MG EC tablet [ASPIRIN 81 MG EC TABLET] Take 81 mg by mouth daily.       escitalopram (LEXAPRO) 10 MG tablet [ESCITALOPRAM OXALATE (LEXAPRO) 10 MG TABLET] TAKE ONE TABLET BY MOUTH ONE TIME DAILY 90 tablet 0     latanoprost (XALATAN) 0.005 % ophthalmic solution [LATANOPROST (XALATAN) 0.005 % OPHTHALMIC SOLUTION]        levothyroxine (SYNTHROID/LEVOTHROID) 150 MCG tablet [LEVOTHYROXINE (SYNTHROID, LEVOTHROID) 150 MCG TABLET] TAKE ONE TABLET BY MOUTH ONE TIME DAILY 90 tablet 0     rosuvastatin (CRESTOR) 10 MG " tablet [ROSUVASTATIN (CRESTOR) 10 MG TABLET] TAKE ONE TABLET BY MOUTH ONE TIME DAILY  90 tablet 1       Outside Notes summarized: ***  Labs, x-rays, cardiology, GI tests reviewed: ***  Recent Labs   Lab Test 10/13/20  0933 04/15/20  0948   NA  --  137   POTASSIUM  --  4.6   CR  --  0.84   PSA <0.1  --    TSH  --  0.23*     New orders: No orders of the defined types were placed in this encounter.      Independent review of:    Supplemental history by:  ***    Patient has given verbal consent to a Video visit?  Yes  How would you like to obtain your AVS?  MyChart  Will anyone else be joining your video visit? No  ***     Video-Visit Details  Type of service:  Video Visit  Originating Location (pt. Location): Home  Distant Location (provider location):   [facility]    Rhonda Rodríguez CMA  [facility]    Video Start Time: 10:43 AM  Video End time:  {Time now:333193}  Face to face plus orders:  *** minutes  Documentation time:  3 minutes     The visit lasted a total of *** minutes

## 2021-08-23 NOTE — PATIENT INSTRUCTIONS
Patient Education   Personalized Prevention Plan  You are due for the preventive services outlined below.  Your care team is available to assist you in scheduling these services.  If you have already completed any of these items, please share that information with your care team to update in your medical record.  Health Maintenance Due   Topic Date Due     ANNUAL REVIEW OF HM ORDERS  Never done     Depression Action Plan  Never done     COVID-19 Vaccine (1) Never done     Hepatitis C Screening  Never done     Annual Wellness Visit  05/16/2019     Diptheria Tetanus Pertussis (DTAP/TDAP/TD) Vaccine (2 - Td or Tdap) 08/25/2021         Refill meds    Update labs    Bone density    Covid booster shot     Primary Covid vaccine from [place]

## 2021-08-23 NOTE — PROGRESS NOTES
"SUBJECTIVE:   Justin Pires is a 75 year old male who presents for Preventive Visit.      Patient has been advised of split billing requirements and indicates understanding: Yes   Are you in the first 12 months of your Medicare coverage?  No    Healthy Habits:     In general, how would you rate your overall health?  Excellent    Frequency of exercise:  2-3 days/week    Duration of exercise:  15-30 minutes    Do you usually eat at least 4 servings of fruit and vegetables a day, include whole grains    & fiber and avoid regularly eating high fat or \"junk\" foods?  Yes    Taking medications regularly:  Yes    Medication side effects:  None    Ability to successfully perform activities of daily living:  No assistance needed    Home Safety:  No safety concerns identified    Hearing Impairment:  No hearing concerns    In the past 6 months, have you been bothered by leaking of urine? Yes    In general, how would you rate your overall mental or emotional health?  Excellent      PHQ-2 Total Score: 0    Additional concerns today:  No    Do you feel safe in your environment? Yes    Have you ever done Advance Care Planning? (For example, a Health Directive, POLST, or a discussion with a medical provider or your loved ones about your wishes): Yes, advance care planning is on file.       Fall risk       Cognitive Screening   1) Repeat 3 items (Leader, Season, Table)    2) Clock draw: NORMAL  3) 3 item recall: Recalls 3 objects  Results: 3 items recalled: COGNITIVE IMPAIRMENT LESS LIKELY    Mini-CogTM Copyright VALERIA Carlton. Licensed by the author for use in [facility]; reprinted with permission (stephany@.[facility]). All rights reserved.      Do you have sleep apnea, excessive snoring or daytime drowsiness?: no    Reviewed and updated as needed this visit by clinical staff   Allergies  Meds              Reviewed and updated as needed this visit by Provider                Social History     Tobacco Use     Smoking status: Never " "Smoker     Smokeless tobacco: Never Used     Tobacco comment: in college social smoker   Substance Use Topics     Alcohol use: Yes     Alcohol/week: 10.0 - 12.0 standard drinks         Alcohol Use 2021   Prescreen: >3 drinks/day or >7 drinks/week? No               Current providers sharing in care for this patient include:   Patient Care Team:  Jalil Manjarrez MD as PCP - General (Internal Medicine)  Turner Sahni MD, MD as MD (Hematology & Oncology)  Jalil Manjarrez MD as Assigned PCP    The following health maintenance items are reviewed in Epic and correct as of today:  Health Maintenance Due   Topic Date Due     ANNUAL REVIEW OF  ORDERS  Never done     DEPRESSION ACTION PLAN  Never done     COVID-19 Vaccine (1) Never done     HEPATITIS C SCREENING  Never done     MEDICARE ANNUAL WELLNESS VISIT  2019     DTAP/TDAP/TD IMMUNIZATION (2 - Td or Tdap) 2021     {Chronicprobdata (optional):437617}  {Decision Support (Optional):033811}        Review of Systems  {ROS COMP (Optional):372807}    OBJECTIVE:   There were no vitals taken for this visit. Estimated body mass index is 27.75 kg/m  as calculated from the following:    Height as of 19: 1.829 m (6').    Weight as of 19: 92.8 kg (204 lb 9.6 oz).  Physical Exam  {Exam (Optional) :067335}    {Diagnostic Test Results (Optional):971178::\"Diagnostic Test Results:\",\"Labs reviewed in Epic\"}    ASSESSMENT / PLAN:   {Dia Picklist:647916}    Patient has been advised of split billing requirements and indicates understanding: {YES / NO:678189::\"Yes\"}  COUNSELING:  {Medicare Counselin}    Estimated body mass index is 27.75 kg/m  as calculated from the following:    Height as of 19: 1.829 m (6').    Weight as of 19: 92.8 kg (204 lb 9.6 oz).    {Weight Management Plan (ACO) Complete if BMI is abnormal-  Ages 18-64  BMI >24.9.  Age 65+ with BMI <23 or >30 (Optional):865359}    He reports that he has never smoked. He has " never used smokeless tobacco.      Appropriate preventive services were discussed with this patient, including applicable screening as appropriate for cardiovascular disease, diabetes, osteopenia/osteoporosis, and glaucoma.  As appropriate for age/gender, discussed screening for colorectal cancer, prostate cancer, breast cancer, and cervical cancer. Checklist reviewing preventive services available has been given to the patient.    Reviewed patients plan of care and provided an AVS. The {CarePlan:454477} for Justin meets the Care Plan requirement. This Care Plan has been established and reviewed with the {PATIENT, FAMILY MEMBER, CAREGIVER:344041}.    Counseling Resources:  ATP IV Guidelines  Pooled Cohorts Equation Calculator  Breast Cancer Risk Calculator  Breast Cancer: Medication to Reduce Risk  FRAX Risk Assessment  ICSI Preventive Guidelines  Dietary Guidelines for Americans, 2010  USDA's MyPlate  ASA Prophylaxis  Lung CA Screening    Jalil Manjarrez MD  [facility]    Identified Health Risks:

## 2021-08-24 ENCOUNTER — TELEPHONE (OUTPATIENT)
Dept: INTERNAL MEDICINE | Facility: CLINIC | Age: 76
End: 2021-08-24

## 2021-08-24 NOTE — TELEPHONE ENCOUNTER
----- Message from Jalil Manjarrez MD sent at 8/23/2021 11:04 AM CDT -----  Please schedule lab appointment, bone density and covid booster shot.  Please also update shot record as he had it in [facility] original   08/24 lm to call scheduling for bone density [phone] and then schedule lab and booster [phone]

## 2021-09-03 ENCOUNTER — LAB (OUTPATIENT)
Dept: LAB | Facility: CLINIC | Age: 76
End: 2021-09-03
Payer: COMMERCIAL

## 2021-09-03 ENCOUNTER — IMMUNIZATION (OUTPATIENT)
Dept: NURSING | Facility: CLINIC | Age: 76
End: 2021-09-03

## 2021-09-03 ENCOUNTER — ANCILLARY PROCEDURE (OUTPATIENT)
Dept: BONE DENSITY | Facility: CLINIC | Age: 76
End: 2021-09-03
Attending: INTERNAL MEDICINE
Payer: COMMERCIAL

## 2021-09-03 DIAGNOSIS — E78.00 PURE HYPERCHOLESTEROLEMIA: ICD-10-CM

## 2021-09-03 DIAGNOSIS — F32.5 MAJOR DEPRESSIVE DISORDER WITH SINGLE EPISODE, IN REMISSION (H): ICD-10-CM

## 2021-09-03 DIAGNOSIS — M94.9 DISORDER OF BONE AND CARTILAGE: ICD-10-CM

## 2021-09-03 DIAGNOSIS — Z11.59 ENCOUNTER FOR HEPATITIS C SCREENING TEST FOR LOW RISK PATIENT: ICD-10-CM

## 2021-09-03 DIAGNOSIS — M89.9 DISORDER OF BONE AND CARTILAGE: ICD-10-CM

## 2021-09-03 DIAGNOSIS — Z79.83 LONG TERM (CURRENT) USE OF BISPHOSPHONATES: ICD-10-CM

## 2021-09-03 DIAGNOSIS — E55.9 VITAMIN D DEFICIENCY: ICD-10-CM

## 2021-09-03 DIAGNOSIS — E03.4 HYPOTHYROIDISM DUE TO ACQUIRED ATROPHY OF THYROID: ICD-10-CM

## 2021-09-03 LAB
ALBUMIN SERPL-MCNC: 4 G/DL (ref 3.5–5)
ALP SERPL-CCNC: 55 U/L (ref 45–120)
ALT SERPL W P-5'-P-CCNC: 23 U/L (ref 0–45)
ANION GAP SERPL CALCULATED.3IONS-SCNC: 12 MMOL/L (ref 5–18)
AST SERPL W P-5'-P-CCNC: 25 U/L (ref 0–40)
BILIRUB SERPL-MCNC: 0.9 MG/DL (ref 0–1)
BUN SERPL-MCNC: 13 MG/DL (ref 8–28)
CALCIUM SERPL-MCNC: 9.3 MG/DL (ref 8.5–10.5)
CHLORIDE BLD-SCNC: 106 MMOL/L (ref 98–107)
CHOLEST SERPL-MCNC: 191 MG/DL
CO2 SERPL-SCNC: 22 MMOL/L (ref 22–31)
CREAT SERPL-MCNC: 0.76 MG/DL (ref 0.7–1.3)
FASTING STATUS PATIENT QL REPORTED: NO
GFR SERPL CREATININE-BSD FRML MDRD: 89 ML/MIN/1.73M2
GLUCOSE BLD-MCNC: 109 MG/DL (ref 70–125)
HDLC SERPL-MCNC: 39 MG/DL
LDLC SERPL CALC-MCNC: 103 MG/DL
LDLC SERPL CALC-MCNC: ABNORMAL MG/DL
POTASSIUM BLD-SCNC: 4.3 MMOL/L (ref 3.5–5)
PROT SERPL-MCNC: 6.9 G/DL (ref 6–8)
SODIUM SERPL-SCNC: 140 MMOL/L (ref 136–145)
TRIGL SERPL-MCNC: 455 MG/DL
TSH SERPL DL<=0.005 MIU/L-ACNC: 0.05 UIU/ML (ref 0.3–5)
VIT B12 SERPL-MCNC: 1576 PG/ML (ref 213–816)

## 2021-09-03 PROCEDURE — 82607 VITAMIN B-12: CPT | Performed by: INTERNAL MEDICINE

## 2021-09-03 PROCEDURE — 82306 VITAMIN D 25 HYDROXY: CPT | Performed by: INTERNAL MEDICINE

## 2021-09-03 PROCEDURE — 0013A PR COVID VAC MODERNA 100 MCG/0.5 ML IM: CPT

## 2021-09-03 PROCEDURE — 80061 LIPID PANEL: CPT | Performed by: INTERNAL MEDICINE

## 2021-09-03 PROCEDURE — 86803 HEPATITIS C AB TEST: CPT | Performed by: INTERNAL MEDICINE

## 2021-09-03 PROCEDURE — 80053 COMPREHEN METABOLIC PANEL: CPT | Performed by: INTERNAL MEDICINE

## 2021-09-03 PROCEDURE — 91301 PR COVID VAC MODERNA 100 MCG/0.5 ML IM: CPT

## 2021-09-03 PROCEDURE — 36415 COLL VENOUS BLD VENIPUNCTURE: CPT | Performed by: INTERNAL MEDICINE

## 2021-09-03 PROCEDURE — 84443 ASSAY THYROID STIM HORMONE: CPT | Performed by: INTERNAL MEDICINE

## 2021-09-03 PROCEDURE — 83721 ASSAY OF BLOOD LIPOPROTEIN: CPT | Mod: 59 | Performed by: INTERNAL MEDICINE

## 2021-09-03 PROCEDURE — 77080 DXA BONE DENSITY AXIAL: CPT | Performed by: INTERNAL MEDICINE

## 2021-09-06 LAB
DEPRECATED CALCIDIOL+CALCIFEROL SERPL-MC: 30 UG/L (ref 30–80)
HCV AB SERPL QL IA: NEGATIVE

## 2021-09-07 DIAGNOSIS — E03.4 HYPOTHYROIDISM DUE TO ACQUIRED ATROPHY OF THYROID: Primary | ICD-10-CM

## 2021-09-07 RX ORDER — LEVOTHYROXINE SODIUM 125 UG/1
125 TABLET ORAL DAILY
Qty: 90 TABLET | Refills: 3 | Status: SHIPPED | OUTPATIENT
Start: 2021-09-07 | End: 2022-08-23

## 2021-10-06 ENCOUNTER — TELEPHONE (OUTPATIENT)
Dept: ONCOLOGY | Facility: HOSPITAL | Age: 76
End: 2021-10-06

## 2021-10-07 ENCOUNTER — ONCOLOGY VISIT (OUTPATIENT)
Dept: ONCOLOGY | Facility: HOSPITAL | Age: 76
End: 2021-10-07
Attending: INTERNAL MEDICINE
Payer: COMMERCIAL

## 2021-10-07 VITALS
OXYGEN SATURATION: 95 % | WEIGHT: 199.1 LBS | DIASTOLIC BLOOD PRESSURE: 87 MMHG | BODY MASS INDEX: 27 KG/M2 | SYSTOLIC BLOOD PRESSURE: 135 MMHG | RESPIRATION RATE: 18 BRPM | HEART RATE: 70 BPM | TEMPERATURE: 98.2 F

## 2021-10-07 DIAGNOSIS — C61 PROSTATE CANCER METASTATIC TO INTRAPELVIC LYMPH NODE (H): Primary | ICD-10-CM

## 2021-10-07 DIAGNOSIS — C77.5 PROSTATE CANCER METASTATIC TO INTRAPELVIC LYMPH NODE (H): Primary | ICD-10-CM

## 2021-10-07 PROCEDURE — 99205 OFFICE O/P NEW HI 60 MIN: CPT | Performed by: INTERNAL MEDICINE

## 2021-10-07 PROCEDURE — G0463 HOSPITAL OUTPT CLINIC VISIT: HCPCS

## 2021-10-07 ASSESSMENT — PAIN SCALES - GENERAL: PAINLEVEL: NO PAIN (0)

## 2021-10-07 NOTE — LETTER
10/7/2021         RE: Justin Pires  [address] [place] [address]        Dear Colleague,    Thank you for referring your patient, Justin Pires, to the [facility]. Please see a copy of my visit note below.    Oncology Rooming Note    October 7, 2021 1:26 PM   Justin Pires is a 75 year old male who presents for:    Chief Complaint   Patient presents with     Oncology Clinic Visit     Prostate cancer (H)     Initial Vitals: /87   Pulse 70   Temp 98.2  F (36.8  C)   Resp 18   Wt 90.3 kg (199 lb 1.6 oz)   SpO2 95%   BMI 27.00 kg/m   Estimated body mass index is 27 kg/m  as calculated from the following:    Height as of 4/11/19: 1.829 m (6').    Weight as of this encounter: 90.3 kg (199 lb 1.6 oz). Body surface area is 2.14 meters squared.  No Pain (0) Comment: Data Unavailable   No LMP for male patient.  Allergies reviewed: Yes  Medications reviewed: Yes    Medications: Medication refills not needed today.  Pharmacy name entered into [facility]: [facility] # [id number] - [place], [place] - [address]    Clinical concerns: None       Liana Rick LPN              [facility] cancer Care Progress Note  Patient: Justin Pires   MRN:  [id number]   Date of Service : Oct 7, 2021         Reason for visit      1. Prostate cancer        Assessment     1.  Metastatic prostate cancer with involvement of the axillary lymph node, bones and intra-abdominal lymph nodes. He responded very well to treatment with Taxotere chemotherapy followed by hormonal therapy.  Was off hormonal therapy from I believe April 2018 until May 2021.  After starting hormonal therapy PSA is undetectable.  2.  Was given recommendation to consider salvage radiation therapy to treat pelvic lymph nodes.  3.  Hormone deprivation side effects.  4.  Musculoskeletal stiffness in his hands.     Plan     1.  Continue Lupron indefinitely.  2.  Discussed with him in my opinion he does not seem likely to benefit  from pelvic radiation therapy. Since we know that he has metastatic disease outside of that region.  3.  Continue with good diet and exercise.  4.  Need to monitor his bone density.  5. Follow-up with me in 6 months if he so desires.    Clinical stage      Prostate cancer    Primary site: Prostate    Clinical: Stage II (T2a, N0, M0) - Signed by Turner Sahni MD on 5/11/2016    Pathologic: Stage II (T2b, N0, M0) - Signed by Turner Sahni MD on 5/11/2016    Summary: Stage II (T2b, N0, M0)      History     Justin Pires is a very pleasant 75 year old  male with a history of elevated PSA. He is actually prior history of prostate cancer diagnosed in 1992 when he presented to [facility] for a executive physical and was found to have elevated PSA. He was completely is symptomatically that point. His workup then revealed that he indeed had prostate cancer. At that point at 47 years of age he underwent retropubic prostatectomy which revealed prostate cancer involving the left lobe of the prostate grade 2 out of 3 with negative margins and negative lymph nodes. He had been in remission ever since. In January 2016 he had a PSA checked on which actually came back at 10.7. It was thought at the time that he might have prostatitis. He was given some antibiotics. He then went down to [place]. There he had his PSA checked and was down to 6. He came back in April 2016 and had a repeat PSA checked which was elevated again at 15.7. Therefore Dr. Manjarrez asked him to come and see me.    He is also been seen by his urologist Dr. Fortunato Collazo. He had MRI CT scan and bone scan done. None of them revealed any lesion.    He had a ProstaScint scan which actually showed some uptake on the right prostatic fossa.  I sent him to Dr. Browne's for another ultrasound-guided biopsy of his right prostate bed.  The biopsy was negative for any detectable prostate cancer.    He then was seen at the [facility] and got a C11 PET-choline image that  showed metastatic disease with bone mets and lymph node mets. He had an axillary lymph node biopsied that was consistent with prostate cancer. He has been started on Lupron in June 2016 and in July 26th 2016 started on Taxotere. Had his first cycle end of July 2016. Tolerated it with expected side effects. His Psa basically became undetectable very quickly. He has finished 6 cycles. Last one in November 2016.    Had C11 PET scan at [facility] that showed near complete response.  Eventually stopped hormonal therapy sometime in spring 2018.  He had been followed by PSAs and C 11 choline PET scan.  His PSA started to rise in January 2021 it was 0.19.  His CA 11 choline PET scan was negative.    In April 2021 his PSA was 0.71.  His choline PET scan revealed several low-level tiny pelvic lymph nodes in the external and common iliac lymph node chains.  He started his hormonal therapy.  His testosterone level checked in 4/16/2021 showed normal testosterone level.    PSA checked on 10/5/2021 showed less than 0.1. His PET scan done on 10/4/2021 at [facility] still showed some persistent uptake in the iliac lymph nodes. He was recommended to consider radiation therapy to those lymph nodes. He is here to discuss that option.    Past Medical History     Past Medical History:   Diagnosis Date     Gout      Hyperlipidemia      Hypothyroidism      Prostate cancer        Review of Systems   Constitutional  Constitutional (WDL): Exceptions to WDL  Neurosensory  Neurosensory (WDL): All neurosensory elements are within defined limits  Cardiovascular  Cardiovascular (WDL): All cardiovascular elements are within defined limits  Pulmonary  Respiratory (WDL): Within Defined Limits  Gastrointestinal  Gastrointestinal (WDL): All gastrointestinal elements are within defined limits  Genitourinary  Genitourinary (WDL): All genitourinary elements are within defined limits  Integumentary  Integumentary (WDL): Exceptions to WDL (boil on bottom  healing)  Patient Coping  Patient Coping: Accepting  Accompanied by  Accompanied by: Alone    ECOG performance status and Distress Assessment      ECOG Performance:    ECOG Performance Status: 0    Distress Assessment  Distress Assessment Score: No distress:     Pain Status  Currently in Pain: No/denies        Vital Signs     /87   Pulse 70   Temp 98.2  F (36.8  C)   Resp 18   Wt 90.3 kg (199 lb 1.6 oz)   SpO2 95%   BMI 27.00 kg/m       Physical Exam     GENERAL: no acute distress. Cooperative in conversation.   HEENT: Chemotherapy associated alopecia. Pupils are equal, round and reactive. Oral mucosa is moist and intact.  RESP:Chest symmetric. Good air entry b/l. Regular respiratory rate. No stridor.  CVS: normal rate, regular rhythm, normal S1, S2, no murmurs, rubs, clicks or gallops.  ABD: Nondistended, soft. Non tender, no HSM. Bowel sounds normal.  EXTREMITIES: No lower extremity edema.   NEURO: non focal. Alert and oriented x3.   PSYCH: within normal limits. No depression or anxiety.  SKIN: warm dry intact.        Lab Results     No results found for this or any previous visit (from the past 240 hour(s)).     Imaging Results     No results found.       Turner Sahni MD           Again, thank you for allowing me to participate in the care of your patient.        Sincerely,        Turner Sahni MD, MD

## 2021-10-07 NOTE — PROGRESS NOTES
[facility] cancer Care Progress Note  Patient: Justin Pires   MRN:  [id number]   Date of Service : Oct 7, 2021         Reason for visit      1. Prostate cancer        Assessment     1.  Metastatic prostate cancer with involvement of the axillary lymph node, bones and intra-abdominal lymph nodes. He responded very well to treatment with Taxotere chemotherapy followed by hormonal therapy.  Was off hormonal therapy from I believe April 2018 until May 2021.  After starting hormonal therapy PSA is undetectable.  2.  Was given recommendation to consider salvage radiation therapy to treat pelvic lymph nodes.  3.  Hormone deprivation side effects.  4.  Musculoskeletal stiffness in his hands.     Plan     1.  Continue Lupron indefinitely.  2.  Discussed with him in my opinion he does not seem likely to benefit from pelvic radiation therapy. Since we know that he has metastatic disease outside of that region.  3.  Continue with good diet and exercise.  4.  Need to monitor his bone density.  5. Follow-up with me in 6 months if he so desires.    Clinical stage      Prostate cancer    Primary site: Prostate    Clinical: Stage II (T2a, N0, M0) - Signed by Turner Sahni MD on 5/11/2016    Pathologic: Stage II (T2b, N0, M0) - Signed by Turner Sahni MD on 5/11/2016    Summary: Stage II (T2b, N0, M0)      History     Justin Pires is a very pleasant 75 year old  male with a history of elevated PSA. He is actually prior history of prostate cancer diagnosed in 1992 when he presented to [facility] for a executive physical and was found to have elevated PSA. He was completely is symptomatically that point. His workup then revealed that he indeed had prostate cancer. At that point at 47 years of age he underwent retropubic prostatectomy which revealed prostate cancer involving the left lobe of the prostate grade 2 out of 3 with negative margins and negative lymph nodes. He had been in remission ever since. In January 2016 he  had a PSA checked on which actually came back at 10.7. It was thought at the time that he might have prostatitis. He was given some antibiotics. He then went down to [place]. There he had his PSA checked and was down to 6. He came back in April 2016 and had a repeat PSA checked which was elevated again at 15.7. Therefore Dr. Manjarrez asked him to come and see me.    He is also been seen by his urologist Dr. Fortunato Collazo. He had MRI CT scan and bone scan done. None of them revealed any lesion.    He had a ProstaScint scan which actually showed some uptake on the right prostatic fossa.  I sent him to Dr. Browne's for another ultrasound-guided biopsy of his right prostate bed.  The biopsy was negative for any detectable prostate cancer.    He then was seen at the [facility] and got a C11 PET-choline image that showed metastatic disease with bone mets and lymph node mets. He had an axillary lymph node biopsied that was consistent with prostate cancer. He has been started on Lupron in June 2016 and in July 26th 2016 started on Taxotere. Had his first cycle end of July 2016. Tolerated it with expected side effects. His Psa basically became undetectable very quickly. He has finished 6 cycles. Last one in November 2016.    Had C11 PET scan at [facility] that showed near complete response.  Eventually stopped hormonal therapy sometime in spring 2018.  He had been followed by PSAs and C 11 choline PET scan.  His PSA started to rise in January 2021 it was 0.19.  His CA 11 choline PET scan was negative.    In April 2021 his PSA was 0.71.  His choline PET scan revealed several low-level tiny pelvic lymph nodes in the external and common iliac lymph node chains.  He started his hormonal therapy.  His testosterone level checked in 4/16/2021 showed normal testosterone level.    PSA checked on 10/5/2021 showed less than 0.1. His PET scan done on 10/4/2021 at [facility] still showed some persistent uptake in the iliac lymph nodes. He was  recommended to consider radiation therapy to those lymph nodes. He is here to discuss that option.    Past Medical History     Past Medical History:   Diagnosis Date     Gout      Hyperlipidemia      Hypothyroidism      Prostate cancer        Review of Systems   Constitutional  Constitutional (WDL): Exceptions to WDL  Neurosensory  Neurosensory (WDL): All neurosensory elements are within defined limits  Cardiovascular  Cardiovascular (WDL): All cardiovascular elements are within defined limits  Pulmonary  Respiratory (WDL): Within Defined Limits  Gastrointestinal  Gastrointestinal (WDL): All gastrointestinal elements are within defined limits  Genitourinary  Genitourinary (WDL): All genitourinary elements are within defined limits  Integumentary  Integumentary (WDL): Exceptions to WDL (boil on bottom healing)  Patient Coping  Patient Coping: Accepting  Accompanied by  Accompanied by: Alone    ECOG performance status and Distress Assessment      ECOG Performance:    ECOG Performance Status: 0    Distress Assessment  Distress Assessment Score: No distress:     Pain Status  Currently in Pain: No/denies        Vital Signs     /87   Pulse 70   Temp 98.2  F (36.8  C)   Resp 18   Wt 90.3 kg (199 lb 1.6 oz)   SpO2 95%   BMI 27.00 kg/m       Physical Exam     GENERAL: no acute distress. Cooperative in conversation.   HEENT: Chemotherapy associated alopecia. Pupils are equal, round and reactive. Oral mucosa is moist and intact.  RESP:Chest symmetric. Good air entry b/l. Regular respiratory rate. No stridor.  CVS: normal rate, regular rhythm, normal S1, S2, no murmurs, rubs, clicks or gallops.  ABD: Nondistended, soft. Non tender, no HSM. Bowel sounds normal.  EXTREMITIES: No lower extremity edema.   NEURO: non focal. Alert and oriented x3.   PSYCH: within normal limits. No depression or anxiety.  SKIN: warm dry intact.        Lab Results     No results found for this or any previous visit (from the past 240  hour(s)).     Imaging Results     No results found.       Turner Sahni MD

## 2021-10-07 NOTE — PROGRESS NOTES
Oncology Rooming Note    October 7, 2021 1:26 PM   Justin Pires is a 75 year old male who presents for:    Chief Complaint   Patient presents with     Oncology Clinic Visit     Prostate cancer (H)     Initial Vitals: /87   Pulse 70   Temp 98.2  F (36.8  C)   Resp 18   Wt 90.3 kg (199 lb 1.6 oz)   SpO2 95%   BMI 27.00 kg/m   Estimated body mass index is 27 kg/m  as calculated from the following:    Height as of 4/11/19: 1.829 m (6').    Weight as of this encounter: 90.3 kg (199 lb 1.6 oz). Body surface area is 2.14 meters squared.  No Pain (0) Comment: Data Unavailable   No LMP for male patient.  Allergies reviewed: Yes  Medications reviewed: Yes    Medications: Medication refills not needed today.  Pharmacy name entered into [facility]: [id number] - [place], [place] - [address]    Clinical concerns: None       Liana Rick LPN

## 2021-10-09 ENCOUNTER — HEALTH MAINTENANCE LETTER (OUTPATIENT)
Age: 76
End: 2021-10-09

## 2021-11-24 DIAGNOSIS — C61 PROSTATE CANCER METASTATIC TO INTRAPELVIC LYMPH NODE (H): Primary | ICD-10-CM

## 2021-11-24 DIAGNOSIS — C77.5 PROSTATE CANCER METASTATIC TO INTRAPELVIC LYMPH NODE (H): Primary | ICD-10-CM

## 2021-12-01 ENCOUNTER — LAB (OUTPATIENT)
Dept: INFUSION THERAPY | Facility: HOSPITAL | Age: 76
End: 2021-12-01
Attending: NURSE PRACTITIONER
Payer: COMMERCIAL

## 2021-12-01 DIAGNOSIS — C77.5 PROSTATE CANCER METASTATIC TO INTRAPELVIC LYMPH NODE (H): ICD-10-CM

## 2021-12-01 DIAGNOSIS — C61 PROSTATE CANCER METASTATIC TO INTRAPELVIC LYMPH NODE (H): ICD-10-CM

## 2021-12-01 LAB — PSA SERPL-MCNC: <0.1 UG/L (ref 0–6.5)

## 2021-12-01 PROCEDURE — 36415 COLL VENOUS BLD VENIPUNCTURE: CPT

## 2021-12-01 PROCEDURE — 84153 ASSAY OF PSA TOTAL: CPT

## 2022-01-29 ENCOUNTER — HEALTH MAINTENANCE LETTER (OUTPATIENT)
Age: 77
End: 2022-01-29

## 2022-02-09 DIAGNOSIS — C77.5 PROSTATE CANCER METASTATIC TO INTRAPELVIC LYMPH NODE (H): Primary | ICD-10-CM

## 2022-02-09 DIAGNOSIS — C61 PROSTATE CANCER METASTATIC TO INTRAPELVIC LYMPH NODE (H): Primary | ICD-10-CM

## 2022-02-14 ENCOUNTER — LAB (OUTPATIENT)
Dept: INFUSION THERAPY | Facility: HOSPITAL | Age: 77
End: 2022-02-14
Attending: INTERNAL MEDICINE
Payer: COMMERCIAL

## 2022-02-14 DIAGNOSIS — C77.5 PROSTATE CANCER METASTATIC TO INTRAPELVIC LYMPH NODE (H): ICD-10-CM

## 2022-02-14 DIAGNOSIS — C61 PROSTATE CANCER METASTATIC TO INTRAPELVIC LYMPH NODE (H): ICD-10-CM

## 2022-02-14 LAB — PSA SERPL-MCNC: <0.1 UG/L (ref 0–6.5)

## 2022-02-14 PROCEDURE — 84153 ASSAY OF PSA TOTAL: CPT

## 2022-02-14 PROCEDURE — 36415 COLL VENOUS BLD VENIPUNCTURE: CPT

## 2022-04-20 ENCOUNTER — TELEPHONE (OUTPATIENT)
Dept: ONCOLOGY | Facility: HOSPITAL | Age: 77
End: 2022-04-20
Payer: COMMERCIAL

## 2022-05-05 ENCOUNTER — INFUSION THERAPY VISIT (OUTPATIENT)
Dept: INFUSION THERAPY | Facility: HOSPITAL | Age: 77
End: 2022-05-05
Attending: INTERNAL MEDICINE
Payer: COMMERCIAL

## 2022-05-05 ENCOUNTER — ONCOLOGY VISIT (OUTPATIENT)
Dept: ONCOLOGY | Facility: HOSPITAL | Age: 77
End: 2022-05-05
Attending: INTERNAL MEDICINE
Payer: COMMERCIAL

## 2022-05-05 ENCOUNTER — PATIENT OUTREACH (OUTPATIENT)
Dept: ONCOLOGY | Facility: HOSPITAL | Age: 77
End: 2022-05-05

## 2022-05-05 VITALS
RESPIRATION RATE: 16 BRPM | WEIGHT: 205.8 LBS | DIASTOLIC BLOOD PRESSURE: 86 MMHG | OXYGEN SATURATION: 98 % | BODY MASS INDEX: 27.91 KG/M2 | HEART RATE: 52 BPM | SYSTOLIC BLOOD PRESSURE: 137 MMHG | TEMPERATURE: 97.6 F

## 2022-05-05 DIAGNOSIS — C61 PROSTATE CANCER METASTATIC TO INTRAPELVIC LYMPH NODE (H): Primary | ICD-10-CM

## 2022-05-05 DIAGNOSIS — C77.5 PROSTATE CANCER METASTATIC TO INTRAPELVIC LYMPH NODE (H): Primary | ICD-10-CM

## 2022-05-05 PROCEDURE — G0463 HOSPITAL OUTPT CLINIC VISIT: HCPCS | Mod: 25

## 2022-05-05 PROCEDURE — 250N000011 HC RX IP 250 OP 636: Performed by: INTERNAL MEDICINE

## 2022-05-05 PROCEDURE — 96402 CHEMO HORMON ANTINEOPL SQ/IM: CPT

## 2022-05-05 PROCEDURE — 99215 OFFICE O/P EST HI 40 MIN: CPT | Performed by: INTERNAL MEDICINE

## 2022-05-05 RX ADMIN — LEUPROLIDE ACETATE 45 MG: KIT SUBCUTANEOUS at 10:53

## 2022-05-05 ASSESSMENT — PAIN SCALES - GENERAL: PAINLEVEL: NO PAIN (0)

## 2022-05-05 NOTE — PROGRESS NOTES
Infusion Nursing Note:  Justin Pires presents today for Eligard.    Patient seen by provider today: Yes: Dr Sahni   present during visit today: Not Applicable.    Note: He was educated on Eligard use and potential side effects..      Intravenous Access:  No IV access needed.    Treatment Conditions:  Not Applicable.      Post Infusion Assessment:  Patient tolerated injection without incident.       Discharge Plan:   Patient discharged in stable condition accompanied by: self.      Anai Miner RN

## 2022-05-05 NOTE — PROGRESS NOTES
Oncology Rooming Note    May 5, 2022 9:09 AM   Justin Pires is a 76 year old male who presents for:    Chief Complaint   Patient presents with     Oncology Clinic Visit     Initial Vitals: /86   Pulse 52   Temp 97.6  F (36.4  C)   Resp 16   Wt 93.4 kg (205 lb 12.8 oz)   SpO2 98%   BMI 27.91 kg/m   Estimated body mass index is 27.91 kg/m  as calculated from the following:    Height as of 4/11/19: 1.829 m (6').    Weight as of this encounter: 93.4 kg (205 lb 12.8 oz). Body surface area is 2.18 meters squared.  No Pain (0) Comment: Data Unavailable   No LMP for male patient.  Allergies reviewed: Yes  Medications reviewed: Yes    Medications: Medication refills not needed today.  Pharmacy name entered into [facility]: [facility] [id number] [place], [place] - [address]    Clinical concerns:  Some questions about ADT shot, and information he got from the [facility] visit      Liana Rick LPN

## 2022-05-05 NOTE — LETTER
5/5/2022         RE: Justin Pires  [address] [place] [address]        Dear Colleague,    Thank you for referring your patient, Justin Pires, to the [facility]. Please see a copy of my visit note below.    Oncology Rooming Note    May 5, 2022 9:09 AM   Justin Pires is a 76 year old male who presents for:    Chief Complaint   Patient presents with     Oncology Clinic Visit     Initial Vitals: /86   Pulse 52   Temp 97.6  F (36.4  C)   Resp 16   Wt 93.4 kg (205 lb 12.8 oz)   SpO2 98%   BMI 27.91 kg/m   Estimated body mass index is 27.91 kg/m  as calculated from the following:    Height as of 4/11/19: 1.829 m (6').    Weight as of this encounter: 93.4 kg (205 lb 12.8 oz). Body surface area is 2.18 meters squared.  No Pain (0) Comment: Data Unavailable   No LMP for male patient.  Allergies reviewed: Yes  Medications reviewed: Yes    Medications: Medication refills not needed today.  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  Metastatic prostate cancer with involvement of the axillary lymph node, bones and intra-abdominal lymph nodes. He responded very well to treatment with Taxotere chemotherapy followed by hormonal therapy.  Was off hormonal therapy from I believe April 2018 until May 2021.  After starting hormonal therapy PSA is undetectable.  Choline PET scan in April 2022 still showed low-level activity in the iliac lymph nodes.  Question of small uptake in the thoracic  vertebral body which was felt to be not clearly metastatic on the MRI which was done subsequently.  2.  Was given recommendation to consider salvage radiation therapy to treat pelvic lymph nodes.  3.  Hormone deprivation side effects.  Justin is due for his Eligard.  4.  Musculoskeletal stiffness in his hands.       Plan      Justin and I had a long discussion about his overall clinical situation.  Justin is in a very fortunate situation that his PSA still undetectable.  His choline PET scan is showing low-level uptake in the iliac lymph nodes.  It is quite possible that some of those iliac nodes harbor some prostate cancer cells.  However it is also equally possible that there are prostate cancer cells outside of that local region which are not yet visible on the choline PET scan.  Therefore in the absence of any local symptoms from this pelvic lymph nodes radiation therapy is only given to give more symptoms and very little benefit.  He will still need to be on androgen deprivation therapy so he is not getting relief from those symptoms either.  Therefore my recommendation at this time is to continue to follow PSA.  Once the PSA starts to go up immediately and abiraterone.  We also have a clinical trial that Justin can consider that includes first-line treatment with enzalutamide along with rucaparib versus a placebo.  Clinical trial [id number].  Standard therapy with enzalutamide is also another option.    We also talked about the role of chemotherapy.  Justin had Taxotere for 6 cycles and did actually remarkably well with that.  It is quite possible that he derived decent benefit from it.  So it is a good option to keep in mind as second line therapy after failure of either enzalutamide or abiraterone.  Obviously if there is some other better therapy available at the time of that decision we will discuss that as well.      Medical decision making      Extremely complex.      Risk      Significant risk of morbidity mortality.   Significant risk of side effects from intervention.      Cancer Staging  No matching staging information was found for the patient.        History of present illness        Mr. Justin Pires male with a history of elevated PSA. He is actually prior history of prostate cancer diagnosed in 1992 when he presented to [facility] for a executive physical and was found to have elevated PSA. He was completely is symptomatically that point. His workup then revealed that he indeed had prostate cancer. At that point at 47 years of age he underwent retropubic prostatectomy which revealed prostate cancer involving the left lobe of the prostate grade 2 out of 3 with negative margins and negative lymph nodes. He had been in remission ever since. In January 2016 he had a PSA checked on which actually came back at 10.7. It was thought at the time that he might have prostatitis. He was given some antibiotics. He then went down to [place]. There he had his PSA checked and was down to 6. He came back in April 2016 and had a repeat PSA checked which was elevated again at 15.7. Therefore Dr. Manjarrez asked him to come and see me.     He is also been seen by his urologist Dr. Fortunato Collazo. He had MRI CT scan and bone scan done. None of them revealed any lesion.     He had a ProstaScint scan which actually showed some uptake on the right prostatic fossa.  I sent him to Dr. Browne's for another ultrasound-guided biopsy of his right prostate bed.  The biopsy was negative for any detectable prostate cancer.     He then was seen at the [facility] and got a C11 PET-choline image that showed metastatic disease with bone mets and lymph node mets. He had an axillary lymph node biopsied that was consistent with prostate cancer. He has been started on Lupron in June 2016 and in July 26th 2016 started on Taxotere. Had his first cycle end of July 2016. Tolerated it with expected side effects. His Psa basically became undetectable very quickly. He has  finished 6 cycles. Last one in November 2016.     Had C11 PET scan at [place] that showed near complete response.  Eventually stopped hormonal therapy sometime in spring 2018.  He had been followed by PSAs and C 11 choline PET scan.  His PSA started to rise in January 2021 it was 0.19.  His CA 11 choline PET scan was negative.     In April 2021 his PSA was 0.71.  His choline PET scan revealed several low-level tiny pelvic lymph nodes in the external and common iliac lymph node chains.  He started his hormonal therapy.  His testosterone level checked in 4/16/2021 showed normal testosterone level.     PSA checked on 10/5/2021 showed less than 0.1. His PET scan done on 10/4/2021 at [facility] still showed some persistent uptake in the iliac lymph nodes. He was recommended to consider radiation therapy to those lymph nodes. He is here to discuss that option.    However in October 2021 I recommended against radiation therapy.  Justin is happy with the recommendation.  Continued on Lupron therapy.  The PSA in the meantime has stayed undetectable.    Justin had another choline PET scan done at [facility] in April 2022.  It showed persistent uptake in the iliac lymph nodes.  However not a very high level of uptake.  There was a question of an uptake in the thoracic vertebral body.  The thoracic vertebral body was further evaluated with the help of an MRI which suggested that this perhaps could be inflammatory from a humeral node rather than a metastatic lesion.    Justin had consult with Dr. Richard Rey radiation oncologist at [facility].  Justin was given option of pelvic radiation up to 64 cGy including the prostate bed versus combination of Lupron with abiraterone.  Justin is here to discuss what is his next best step.  His PSA still continues to be undetectable.  He feels well.  Plays golf and does yard work.  Performance status is 0.      Review of system      Details noted in the history of present  illness.  A 14 point review of systems is otherwise negative.        Past medical history      Past Medical History:   Diagnosis Date     Gout      Hyperlipidemia      Hypothyroidism      Prostate cancer (H)        Past Surgical History:   Procedure Laterality Date     APPENDECTOMY       CATARACT EXTRACTION       COLONOSCOPY  12/11/2017     HERNIA REPAIR       PROSTATE SURGERY  1992     [facility] APPENDECTOMY      Description: Appendectomy;  Recorded: 03/17/2008;     [facility] REMV PROSTATE,RETROPUB,RAD,LTD NODES      Description: Prostatectomy Retropubic Radical With Lymph Node Biopsy(S);  Recorded: 03/25/2007;       Physical exam        /86   Pulse 52   Temp 97.6  F (36.4  C)   Resp 16   Wt 93.4 kg (205 lb 12.8 oz)   SpO2 98%   BMI 27.91 kg/m      GENERAL: no acute distress. Cooperative in conversation.   HEENT: pupils are equal, round and reactive. Oral mucosa is moist and intact.  RESP:Chest symmetric. Regular respiratory rate. No stridor.  ABD: Nondistended, soft.  EXTREMITIES: No lower extremity edema.   NEURO: non focal. Alert and oriented x3.   PSYCH: within normal limits. No depression or anxiety.  SKIN: warm dry intact       Lab results      No results found for this or any previous visit (from the past 168 hour(s)).  Reviewed his PSA from [facility] which was less than 0.1.    Imaging results        No results found.  Reviewed the last 2 choline-11 PET scans done at [facility].  Slight uptake noted in the iliac lymph nodes.  Not significantly changed from April 2021.  Reviewed the MRI results as well.    Total time spent was 60 minutes on today's visit.    Signed by: Turner Sahni MD,       This note has been dictated using voice recognition software. Any grammatical or context distortions are unintentional and inherent to the software        Again, thank you for allowing me to participate in the care of your patient.        Sincerely,        Turner Sahni MD, MD

## 2022-05-05 NOTE — PROGRESS NOTES
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  Metastatic prostate cancer with involvement of the axillary lymph node, bones and intra-abdominal lymph nodes. He responded very well to treatment with Taxotere chemotherapy followed by hormonal therapy.  Was off hormonal therapy from I believe April 2018 until May 2021.  After starting hormonal therapy PSA is undetectable.  Choline PET scan in April 2022 still showed low-level activity in the iliac lymph nodes.  Question of small uptake in the thoracic vertebral body which was felt to be not clearly metastatic on the MRI which was done subsequently.  2.  Was given recommendation to consider salvage radiation therapy to treat pelvic lymph nodes.  3.  Hormone deprivation side effects.  Justin is due for his Eligard.  4.  Musculoskeletal stiffness in his hands.       Plan      Justin and I had a long discussion about his overall clinical situation.  Justin is in a very fortunate situation that his PSA still undetectable.  His choline PET scan is showing low-level uptake in the iliac lymph nodes.  It is quite possible that some of those iliac nodes harbor some prostate cancer cells.  However it is also equally possible that there are prostate cancer cells outside of that local region which are not yet visible on the choline PET scan.  Therefore in the absence of any local symptoms from this pelvic lymph nodes radiation therapy is only given to give more symptoms and very little benefit.  He will still need to be on androgen deprivation therapy so he is not getting relief from those symptoms either.  Therefore my recommendation at this time is to continue to follow PSA.  Once the PSA starts to go up immediately and  abiraterone.  We also have a clinical trial that Justin can consider that includes first-line treatment with enzalutamide along with rucaparib versus a placebo.  Clinical trial [id number].  Standard therapy with enzalutamide is also another option.    We also talked about the role of chemotherapy.  Justin had Taxotere for 6 cycles and did actually remarkably well with that.  It is quite possible that he derived decent benefit from it.  So it is a good option to keep in mind as second line therapy after failure of either enzalutamide or abiraterone.  Obviously if there is some other better therapy available at the time of that decision we will discuss that as well.      Medical decision making      Extremely complex.      Risk      Significant risk of morbidity mortality.  Significant risk of side effects from intervention.      Cancer Staging  No matching staging information was found for the patient.        History of present illness        Mr. Justin Pires male with a history of elevated PSA. He is actually prior history of prostate cancer diagnosed in 1992 when he presented to [facility] for a executive physical and was found to have elevated PSA. He was completely is symptomatically that point. His workup then revealed that he indeed had prostate cancer. At that point at 47 years of age he underwent retropubic prostatectomy which revealed prostate cancer involving the left lobe of the prostate grade 2 out of 3 with negative margins and negative lymph nodes. He had been in remission ever since. In January 2016 he had a PSA checked on which actually came back at 10.7. It was thought at the time that he might have prostatitis. He was given some antibiotics. He then went down to [place]. There he had his PSA checked and was down to 6. He came back in April 2016 and had a repeat PSA checked which was elevated again at 15.7. Therefore Dr. Manjarrez asked him to come and see me.     He is also been seen by his urologist   Fortunato Collazo. He had MRI CT scan and bone scan done. None of them revealed any lesion.     He had a ProstaScint scan which actually showed some uptake on the right prostatic fossa.  I sent him to Dr. Browne's for another ultrasound-guided biopsy of his right prostate bed.  The biopsy was negative for any detectable prostate cancer.     He then was seen at the [facility] and got a C11 PET-choline image that showed metastatic disease with bone mets and lymph node mets. He had an axillary lymph node biopsied that was consistent with prostate cancer. He has been started on Lupron in June 2016 and in July 26th 2016 started on Taxotere. Had his first cycle end of July 2016. Tolerated it with expected side effects. His Psa basically became undetectable very quickly. He has finished 6 cycles. Last one in November 2016.     Had C11 PET scan at [place] that showed near complete response.  Eventually stopped hormonal therapy sometime in spring 2018.  He had been followed by PSAs and C 11 choline PET scan.  His PSA started to rise in January 2021 it was 0.19.  His CA 11 choline PET scan was negative.     In April 2021 his PSA was 0.71.  His choline PET scan revealed several low-level tiny pelvic lymph nodes in the external and common iliac lymph node chains.  He started his hormonal therapy.  His testosterone level checked in 4/16/2021 showed normal testosterone level.     PSA checked on 10/5/2021 showed less than 0.1. His PET scan done on 10/4/2021 at [facility] still showed some persistent uptake in the iliac lymph nodes. He was recommended to consider radiation therapy to those lymph nodes. He is here to discuss that option.    However in October 2021 I recommended against radiation therapy.  Justin is happy with the recommendation.  Continued on Lupron therapy.  The PSA in the meantime has stayed undetectable.    Justin had another choline PET scan done at [facility] in April 2022.  It showed persistent uptake in the iliac lymph  nodes.  However not a very high level of uptake.  There was a question of an uptake in the thoracic vertebral body.  The thoracic vertebral body was further evaluated with the help of an MRI which suggested that this perhaps could be inflammatory from a humeral node rather than a metastatic lesion.    Justin had consult with Dr. Richard Rey radiation oncologist at [facility].  Justin was given option of pelvic radiation up to 64 cGy including the prostate bed versus combination of Lupron with abiraterone.  Justin is here to discuss what is his next best step.  His PSA still continues to be undetectable.  He feels well.  Plays golf and does yard work.  Performance status is 0.      Review of system      Details noted in the history of present illness.  A 14 point review of systems is otherwise negative.        Past medical history      Past Medical History:   Diagnosis Date     Gout      Hyperlipidemia      Hypothyroidism      Prostate cancer (H)        Past Surgical History:   Procedure Laterality Date     APPENDECTOMY       CATARACT EXTRACTION       COLONOSCOPY  12/11/2017     HERNIA REPAIR       PROSTATE SURGERY  1992     [facility] APPENDECTOMY      Description: Appendectomy;  Recorded: 03/17/2008;     [facility] REMV PROSTATE,RETROPUB,RAD,LTD NODES      Description: Prostatectomy Retropubic Radical With Lymph Node Biopsy(S);  Recorded: 03/25/2007;       Physical exam        /86   Pulse 52   Temp 97.6  F (36.4  C)   Resp 16   Wt 93.4 kg (205 lb 12.8 oz)   SpO2 98%   BMI 27.91 kg/m      GENERAL: no acute distress. Cooperative in conversation.   HEENT: pupils are equal, round and reactive. Oral mucosa is moist and intact.  RESP:Chest symmetric. Regular respiratory rate. No stridor.  ABD: Nondistended, soft.  EXTREMITIES: No lower extremity edema.   NEURO: non focal. Alert and oriented x3.   PSYCH: within normal limits. No depression or anxiety.  SKIN: warm dry intact       Lab results      No  results found for this or any previous visit (from the past 168 hour(s)).  Reviewed his PSA from [facility] which was less than 0.1.    Imaging results        No results found.  Reviewed the last 2 choline-11 PET scans done at [facility].  Slight uptake noted in the iliac lymph nodes.  Not significantly changed from April 2021.  Reviewed the MRI results as well.    Total time spent was 60 minutes on today's visit.    Signed by: Turner Sahni MD,       This note has been dictated using voice recognition software. Any grammatical or context distortions are unintentional and inherent to the software

## 2022-05-12 ENCOUNTER — TELEPHONE (OUTPATIENT)
Dept: INTERNAL MEDICINE | Facility: CLINIC | Age: 77
End: 2022-05-12
Payer: COMMERCIAL

## 2022-05-12 DIAGNOSIS — H40.002 GLAUCOMA SUSPECT, LEFT: Primary | ICD-10-CM

## 2022-05-12 NOTE — TELEPHONE ENCOUNTER
Reason for Call:  Medication or medication refill:    Do you use a [facility]?  Name of the pharmacy and phone number for the current request:  [facility]-updated    Name of the medication requested: latanoprost (XALATAN) 0.005 % ophthalmic solution    Other request: pt uses every night and and is out.    Can we leave a detailed message on this number? YES    Phone number patient can be reached at: Cell number on file:    Telephone Information:   Mobile [phone]       Best Time: any    Call taken on 5/12/2022 at 11:27 AM by Pam J. Behr

## 2022-05-13 RX ORDER — LATANOPROST 50 UG/ML
SOLUTION/ DROPS OPHTHALMIC
Qty: 7.5 ML | Refills: 3 | Status: SHIPPED | OUTPATIENT
Start: 2022-05-13 | End: 2022-12-02

## 2022-06-05 ENCOUNTER — MYC MEDICAL ADVICE (OUTPATIENT)
Dept: INTERNAL MEDICINE | Facility: CLINIC | Age: 77
End: 2022-06-05
Payer: COMMERCIAL

## 2022-06-05 DIAGNOSIS — N48.89 IRRITATION OF PENIS: Primary | ICD-10-CM

## 2022-06-05 DIAGNOSIS — B37.42 CANDIDAL BALANITIS: ICD-10-CM

## 2022-06-06 RX ORDER — CLOTRIMAZOLE 1 %
CREAM (GRAM) TOPICAL 2 TIMES DAILY
Qty: 30 G | Refills: 11 | Status: SHIPPED | OUTPATIENT
Start: 2022-06-06

## 2022-06-06 RX ORDER — MUPIROCIN 20 MG/G
OINTMENT TOPICAL
Qty: 30 G | Refills: 3 | Status: SHIPPED | OUTPATIENT
Start: 2022-06-06 | End: 2024-04-15

## 2022-07-12 DIAGNOSIS — L71.9 ROSACEA: Primary | ICD-10-CM

## 2022-07-12 RX ORDER — AZELAIC ACID 0.15 G/G
GEL TOPICAL
Qty: 50 G | Refills: 0 | Status: SHIPPED | OUTPATIENT
Start: 2022-07-12 | End: 2024-04-15

## 2022-07-12 NOTE — TELEPHONE ENCOUNTER
Routing refill request to provider for review/approval because:  Drug not on the [facility] refill protocol   Drug not active on patient's medication list    Last Written Prescription Date:    Last Fill Quantity: ,  # refills:    Last office visit provider:  8/23/21     Requested Prescriptions   Pending Prescriptions Disp Refills     azelaic acid (FINACIA) 15 % external gel [Pharmacy Med Name: Azelaic Acid External Gel 15 %] 50 g 0     Sig: AFTER SKIN IS THROUGHLY WASHED AND DRY, GENTLY & THOROUGHLY MASSAGE A THIN FILM INTO AFFECTED AREA TWICE DAILY.       There is no refill protocol information for this order          Vesna Sims, RN 07/12/22 4:46 PM

## 2022-07-19 ENCOUNTER — LAB (OUTPATIENT)
Dept: INFUSION THERAPY | Facility: HOSPITAL | Age: 77
End: 2022-07-19
Attending: INTERNAL MEDICINE
Payer: COMMERCIAL

## 2022-07-19 DIAGNOSIS — C77.5 PROSTATE CANCER METASTATIC TO INTRAPELVIC LYMPH NODE (H): ICD-10-CM

## 2022-07-19 DIAGNOSIS — C61 PROSTATE CANCER METASTATIC TO INTRAPELVIC LYMPH NODE (H): ICD-10-CM

## 2022-07-19 PROCEDURE — 36415 COLL VENOUS BLD VENIPUNCTURE: CPT

## 2022-07-19 PROCEDURE — 84153 ASSAY OF PSA TOTAL: CPT

## 2022-08-22 DIAGNOSIS — E03.4 HYPOTHYROIDISM DUE TO ACQUIRED ATROPHY OF THYROID: ICD-10-CM

## 2022-08-22 DIAGNOSIS — E78.00 PURE HYPERCHOLESTEROLEMIA: ICD-10-CM

## 2022-08-22 DIAGNOSIS — M89.9 DISORDER OF BONE AND CARTILAGE: ICD-10-CM

## 2022-08-22 DIAGNOSIS — M94.9 DISORDER OF BONE AND CARTILAGE: ICD-10-CM

## 2022-08-23 ENCOUNTER — MYC MEDICAL ADVICE (OUTPATIENT)
Dept: ONCOLOGY | Facility: HOSPITAL | Age: 77
End: 2022-08-23

## 2022-08-23 RX ORDER — LEVOTHYROXINE SODIUM 125 UG/1
TABLET ORAL
Qty: 90 TABLET | Refills: 0 | Status: SHIPPED | OUTPATIENT
Start: 2022-08-23 | End: 2022-11-20

## 2022-08-23 RX ORDER — ALENDRONATE SODIUM 70 MG/1
TABLET ORAL
Qty: 12 TABLET | Refills: 0 | Status: SHIPPED | OUTPATIENT
Start: 2022-08-23 | End: 2022-12-05

## 2022-08-23 RX ORDER — ROSUVASTATIN CALCIUM 10 MG/1
TABLET, COATED ORAL
Qty: 90 TABLET | Refills: 0 | Status: SHIPPED | OUTPATIENT
Start: 2022-08-23 | End: 2022-12-02

## 2022-08-23 NOTE — TELEPHONE ENCOUNTER
"Routing refill request to provider for review/approval because:  Labs out of range:  TSH  Patient needs to be seen because it has been more than 1 year since last office visit.    Last Written Prescription Date:  8/23/21  Last Fill Quantity: 12/90,  # refills: 3   Last office visit provider:  8/23/21     Requested Prescriptions   Pending Prescriptions Disp Refills     rosuvastatin (CRESTOR) 10 MG tablet [Pharmacy Med Name: Rosuvastatin Calcium Oral Tablet 10 MG] 90 tablet 0     Sig: TAKE ONE TABLET BY MOUTH ONE TIME DAILY       Statins Protocol Passed - 8/23/2022  7:56 AM        Passed - LDL on file in past 12 months     Recent Labs   Lab Test 09/03/21  1433                Passed - No abnormal creatine kinase in past 12 months     No lab results found.             Passed - Recent (12 mo) or future (30 days) visit within the authorizing provider's specialty     Patient has had an office visit with the authorizing provider or a provider within the authorizing providers department within the previous 12 mos or has a future within next 30 days. See \"Patient Info\" tab in inbasket, or \"Choose Columns\" in Meds & Orders section of the refill encounter.              Passed - Medication is active on med list        Passed - Patient is age 18 or older           levothyroxine (SYNTHROID/LEVOTHROID) 125 MCG tablet [Pharmacy Med Name: Levothyroxine Sodium Oral Tablet 125 MCG] 90 tablet 0     Sig: TAKE ONE TABLET BY MOUTH ONE TIME DAILY       Thyroid Protocol Failed - 8/22/2022 10:32 AM        Failed - Normal TSH on file in past 12 months     Recent Labs   Lab Test 09/03/21  1433   TSH 0.05*              Passed - Patient is 12 years or older        Passed - Recent (12 mo) or future (30 days) visit within the authorizing provider's specialty     Patient has had an office visit with the authorizing provider or a provider within the authorizing providers department within the previous 12 mos or has a future within next 30 " "days. See \"Patient Info\" tab in inbasket, or \"Choose Columns\" in Meds & Orders section of the refill encounter.              Passed - Medication is active on med list           alendronate (FOSAMAX) 70 MG tablet [Pharmacy Med Name: Alendronate Sodium Oral Tablet 70 MG] 12 tablet 0     Sig: TAKE ONE TABLET BY MOUTH WEEKLY       Bisphosphonates Passed - 8/22/2022 10:32 AM        Passed - Recent (12 mo) or future (30 days) visit within the authorizing provider's specialty     Patient has had an office visit with the authorizing provider or a provider within the authorizing providers department within the previous 12 mos or has a future within next 30 days. See \"Patient Info\" tab in inbasket, or \"Choose Columns\" in Meds & Orders section of the refill encounter.              Passed - Dexa on file within past 2 years     Please review last Dexa result.           Passed - Medication is active on med list        Passed - Patient is age 18 or older        Passed - Normal serum creatinine on file within past 12 months     Recent Labs   Lab Test 09/03/21  1433   CR 0.76       Ok to refill medication if creatinine is low               Pavel Dowell RN 08/23/22 7:56 AM  "

## 2022-09-17 ENCOUNTER — HEALTH MAINTENANCE LETTER (OUTPATIENT)
Age: 77
End: 2022-09-17

## 2022-10-10 ENCOUNTER — TELEPHONE (OUTPATIENT)
Dept: ONCOLOGY | Facility: HOSPITAL | Age: 77
End: 2022-10-10

## 2022-11-17 ENCOUNTER — LAB (OUTPATIENT)
Dept: INFUSION THERAPY | Facility: HOSPITAL | Age: 77
End: 2022-11-17
Attending: INTERNAL MEDICINE
Payer: COMMERCIAL

## 2022-11-17 ENCOUNTER — ONCOLOGY VISIT (OUTPATIENT)
Dept: ONCOLOGY | Facility: HOSPITAL | Age: 77
End: 2022-11-17
Attending: INTERNAL MEDICINE
Payer: COMMERCIAL

## 2022-11-17 VITALS
OXYGEN SATURATION: 96 % | HEART RATE: 62 BPM | DIASTOLIC BLOOD PRESSURE: 74 MMHG | SYSTOLIC BLOOD PRESSURE: 122 MMHG | HEIGHT: 72 IN | WEIGHT: 202 LBS | BODY MASS INDEX: 27.36 KG/M2

## 2022-11-17 DIAGNOSIS — C61 PROSTATE CANCER METASTATIC TO INTRAPELVIC LYMPH NODE (H): Primary | ICD-10-CM

## 2022-11-17 DIAGNOSIS — C61 PROSTATE CANCER METASTATIC TO INTRAPELVIC LYMPH NODE (H): ICD-10-CM

## 2022-11-17 DIAGNOSIS — C41.2 MALIGNANT NEOPLASM OF VERTEBRAL COLUMN, EXCLUDING SACRUM AND COCCYX (H): ICD-10-CM

## 2022-11-17 DIAGNOSIS — C77.5 PROSTATE CANCER METASTATIC TO INTRAPELVIC LYMPH NODE (H): ICD-10-CM

## 2022-11-17 DIAGNOSIS — C77.5 PROSTATE CANCER METASTATIC TO INTRAPELVIC LYMPH NODE (H): Primary | ICD-10-CM

## 2022-11-17 LAB
HOLD SPECIMEN: NORMAL
HOLD SPECIMEN: NORMAL
PSA SERPL-MCNC: <0.01 NG/ML (ref 0–6.5)

## 2022-11-17 PROCEDURE — 250N000011 HC RX IP 250 OP 636: Performed by: INTERNAL MEDICINE

## 2022-11-17 PROCEDURE — 96402 CHEMO HORMON ANTINEOPL SQ/IM: CPT

## 2022-11-17 PROCEDURE — 84153 ASSAY OF PSA TOTAL: CPT

## 2022-11-17 PROCEDURE — 36415 COLL VENOUS BLD VENIPUNCTURE: CPT

## 2022-11-17 PROCEDURE — 99214 OFFICE O/P EST MOD 30 MIN: CPT | Performed by: INTERNAL MEDICINE

## 2022-11-17 PROCEDURE — G0463 HOSPITAL OUTPT CLINIC VISIT: HCPCS | Mod: 25

## 2022-11-17 RX ADMIN — LEUPROLIDE ACETATE 45 MG: KIT SUBCUTANEOUS at 09:49

## 2022-11-17 ASSESSMENT — PAIN SCALES - GENERAL: PAINLEVEL: NO PAIN (0)

## 2022-11-17 NOTE — PROGRESS NOTES
Oncology Rooming Note    November 17, 2022 8:45 AM   Justin Pires is a 76 year old male who presents for:    Chief Complaint   Patient presents with     Oncology Clinic Visit     Prostate cancer metastatic to intrapelvic lymph node     Initial Vitals: /74   Pulse 62   Ht 1.829 m (6')   Wt 91.6 kg (202 lb)   SpO2 96%   BMI 27.40 kg/m   Estimated body mass index is 27.4 kg/m  as calculated from the following:    Height as of this encounter: 1.829 m (6').    Weight as of this encounter: 91.6 kg (202 lb). Body surface area is 2.16 meters squared.  No Pain (0) Comment: Data Unavailable   No LMP for male patient.  Allergies reviewed: Yes  Medications reviewed: Yes    Medications: Medication refills not needed today.  Pharmacy name entered into Ring: [facility] # [id number] [place], [place] - [address]    Clinical concerns: labs and injection    Kirsten Bledsoe

## 2022-11-17 NOTE — PROGRESS NOTES
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  Metastatic prostate cancer with involvement of the axillary lymph node, bones and intra-abdominal lymph nodes. He responded very well to treatment with Taxotere chemotherapy followed by hormonal therapy.  Was off hormonal therapy from I believe April 2018 until May 2021.  After starting hormonal therapy PSA is undetectable.  Choline PET scan in April 2022 still showed low-level activity in the iliac lymph nodes.  Question of small uptake in the thoracic vertebral body which was felt to be not clearly metastatic on the MRI which was done subsequently.  Currently treatment with with Anti androgen therapy with Lupron alone.  2.  Was given recommendation to consider salvage radiation therapy to treat pelvic lymph nodes.  3.  Hormone deprivation side effects.  Justin is due for his Eligard.  4.  Musculoskeletal stiffness in his hands.       Plan      1.  Lupron 45 mg SQ/IM today.  2.  Continue monitoring of the PSA. We can also do another C-11 PET scan in August next year.  3.  Continue with alendronate to treat bone density.  4.  Continue good diet exercise.  5.  Continue with pandemic precautions.      Medical decision making      Moderately complex.      Risk      Significant risk of morbidity mortality.  Significant risk of side effects from intervention.       Cancer Staging   No matching staging information was found for the patient.        History of present illness        Mr. Justin Pires male with a history of elevated PSA. He is actually prior history of prostate cancer diagnosed in 1992 when he presented to [facility] for a executive physical and was found to have elevated PSA. He was completely is  symptomatically that point. His workup then revealed that he indeed had prostate cancer. At that point at 47 years of age he underwent retropubic prostatectomy which revealed prostate cancer involving the left lobe of the prostate grade 2 out of 3 with negative margins and negative lymph nodes. He had been in remission ever since. In January 2016 he had a PSA checked on which actually came back at 10.7. It was thought at the time that he might have prostatitis. He was given some antibiotics. He then went down to [place]. There he had his PSA checked and was down to 6. He came back in April 2016 and had a repeat PSA checked which was elevated again at 15.7. Therefore Dr. Manjarrez asked him to come and see me.     He is also been seen by his urologist Dr. Fortunato Collazo. He had MRI CT scan and bone scan done. None of them revealed any lesion.     He had a ProstaScint scan which actually showed some uptake on the right prostatic fossa.  I sent him to Dr. Browne's for another ultrasound-guided biopsy of his right prostate bed.  The biopsy was negative for any detectable prostate cancer.     He then was seen at the [facility] and got a C11 PET-choline image that showed metastatic disease with bone mets and lymph node mets. He had an axillary lymph node biopsied that was consistent with prostate cancer. He has been started on Lupron in June 2016 and in July 26th 2016 started on Taxotere. Had his first cycle end of July 2016. Tolerated it with expected side effects. His Psa basically became undetectable very quickly. He has finished 6 cycles. Last one in November 2016.     Had C11 PET scan at [place] that showed near complete response.  Eventually stopped hormonal therapy sometime in spring 2018.  He had been followed by PSAs and C 11 choline PET scan.  His PSA started to rise in January 2021 it was 0.19.  His CA 11 choline PET scan was negative.     In April 2021 his PSA was 0.71.  His choline PET scan revealed several  low-level tiny pelvic lymph nodes in the external and common iliac lymph node chains.  He started his hormonal therapy.  His testosterone level checked in 4/16/2021 showed normal testosterone level.     PSA checked on 10/5/2021 showed less than 0.1. His PET scan done on 10/4/2021 at [facility] still showed some persistent uptake in the iliac lymph nodes. He was recommended to consider radiation therapy to those lymph nodes. He is here to discuss that option.    However in October 2021 I recommended against radiation therapy.  Justin is happy with the recommendation.  Continued on Lupron therapy.  The PSA in the meantime has stayed undetectable.    Justin had another choline PET scan done at [facility] in April 2022.  It showed persistent uptake in the iliac lymph nodes.  However not a very high level of uptake.  There was a question of an uptake in the thoracic vertebral body.  The thoracic vertebral body was further evaluated with the help of an MRI which suggested that this perhaps could be inflammatory from a humeral node rather than a metastatic lesion.    Justin had consult with Dr. Richard Rey radiation oncologist at [facility].  Justin was given option of pelvic radiation up to 64 cGy including the prostate bed versus combination of Lupron with abiraterone.  I recommended against radiation and proceeding with Anti androgen therapy with Lupron alone.  Justin has been doing exceedingly well.  PSA continues to be undetectable.  Choline PET scan done in August 2022 showed improvement in all the areas of the disease that was visible before.  No new areas.    Comes in today for his scheduled appointment for the injection.  No new side effects reported.  Some urinary symptoms which are to some extent expected.  Some dryness of the eyes.  No musculoskeletal symptoms of new nature.    Review of system      Details noted in the history of present illness.  A 14 point review of systems is otherwise  negative.        Past medical history      Past Medical History:   Diagnosis Date     Gout      Hyperlipidemia      Hypothyroidism      Prostate cancer (H)        Past Surgical History:   Procedure Laterality Date     APPENDECTOMY       CATARACT EXTRACTION       COLONOSCOPY  12/11/2017     HERNIA REPAIR       PROSTATE SURGERY  1992     [facility] APPENDECTOMY      Description: Appendectomy;  Recorded: 03/17/2008;     [facility] REMV PROSTATE,RETROPUB,RAD,LTD NODES      Description: Prostatectomy Retropubic Radical With Lymph Node Biopsy(S);  Recorded: 03/25/2007;       Physical exam        /74   Pulse 62   Ht 1.829 m (6')   Wt 91.6 kg (202 lb)   SpO2 96%   BMI 27.40 kg/m    GENERAL: No acute distress. Cooperative in conversation.   HEENT:  Pupils are equal, round and reactive. Oral mucosa is clean and intact. No ulcerations or mucositis noted. No bleeding noted.  RESP:Chest symmetric lungs are clear bilaterally per auscultation. Regular respiratory rate. No wheezes or rhonchi.  CV: Normal S1 S2 Regular, rate and rhythm. No murmurs.    ABD: Nondistended, soft, nontender. Positive bowel sounds. No organomegaly.   EXTREMITIES: No lower extremity edema.   NEURO: Non- focal. Alert and oriented x3.  Cranial nerves appear intact.  PSYCH: Within normal limits. No depression or anxiety.  SKIN: Warm dry intact.      Lab results      No results found for this or any previous visit (from the past 168 hour(s)).  Reviewed his PSA from [facility] which was less than 0.1.    Imaging results      Anatomical Region Laterality Modality   Body -- Positron Emission Tomography (PET)   Nuclear Medicine PET RST LOS -- Positron Emission Tomography (PET)   PET [facility] -- --   Nuclear Medicine PET [facility] -- --   Nuclear Medicine -- --     Impression    1. Interval reduction in the mild increased choline uptake seen in the small left common iliac node.   No new choline uptake within the pelvic or abdominal nodes.   2. There has been interval  significant improvement of the choline uptake previously noted in the T5   vertebral body. No increased choline uptake is seen in the T9 vertebral body.   3. No new increased choline uptake within the bones is noted.   4. No abnormal choline uptake is seen to indicate tumor recurrence in the prostate bed.  Narrative    EXAM:  PET CT CHOLINE     RADIOPHARMACEUTICAL/MEDS:   Route: intravenous   choline C 11 injection (CHOLINE C-11),13.21 millicurie     TECHNIQUE:  C-11 Choline PET/CT scan was performed from the orbits through the thighs with low dose,   non-contrast, free-breathing CT images for attenuation correction and anatomic localization (AC/AL),   with imaging beginning at approximately 5 minutes after radiotracer injection.     COMPARISON:  Previous choline PET scan dated 04/18/2022.     INDICATION:  Restaging prostate carcinoma. Rule out metastatic prostate carcinoma. Previous MR of   the thoracic spine showed possible metastasis and the superior T9 vertebral body. Previous choline   PET scan showed possible metastasis in the left iliac lymph node and T5 vertebral body. Subsequent   treatment strategy.     The patient reports no recent vaccinations.     FINDINGS:  Choline PET scan images show no increased choline uptake within the prostate bed or lower   pelvis. There is again mild increased tracer uptake within the left common iliac node (PET/CT image   236 on the current scan). The choline SUV maximum has decreased from the previous 1.7 to now measure   1.2 with the node again measuring 1.0 x 0.4 cm. No increased uptake seen in the remainder the pelvic   nodes.     Multiple tiny bilateral inguinal nodes with mild increased choline uptake are again noted and   unchanged from the previous scan. These appear to be nonspecific and possibly inflammatory.     No increased choline uptake seen in the abdominal or thoracic nodes.     Previous noted increased choline uptake in the T5 vertebral body has decreased over  the interval   (PET/CT image 91) with the SUV maximum measuring 1.4 previously measuring 3.2. The choline uptake is   only minimally above background.     No increased choline uptake is seen in the T9 vertebral body correlate with the findings on the MR   scan from 04/22/2022. No additional or new increased choline uptake within the bones.     Additional findings on the noncontrast low-dose CT: No change from the previous scan.  Procedure Note      No results found.      Signed by: Turner Sahni MD,       This note has been dictated using voice recognition software. Any grammatical or context distortions are unintentional and inherent to the software

## 2022-11-17 NOTE — LETTER
11/17/2022         RE: Justin Pires  [address] [place] [address]        Dear Colleague,    Thank you for referring your patient, Justin Pires, to the [facility]. Please see a copy of my visit note below.    Oncology Rooming Note    November 17, 2022 8:45 AM   Justin Pires is a 76 year old male who presents for:    Chief Complaint   Patient presents with     Oncology Clinic Visit     Prostate cancer metastatic to intrapelvic lymph node     Initial Vitals: /74   Pulse 62   Ht 1.829 m (6')   Wt 91.6 kg (202 lb)   SpO2 96%   BMI 27.40 kg/m   Estimated body mass index is 27.4 kg/m  as calculated from the following:    Height as of this encounter: 1.829 m (6').    Weight as of this encounter: 91.6 kg (202 lb). Body surface area is 2.16 meters squared.  No Pain (0) Comment: Data Unavailable   No LMP for male patient.  Allergies reviewed: Yes  Medications reviewed: Yes    Medications: Medication refills not needed today.  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  Metastatic prostate cancer with involvement of the axillary lymph node, bones and intra-abdominal lymph nodes. He responded very well to treatment with Taxotere chemotherapy followed by hormonal therapy.  Was off hormonal therapy from I believe April 2018 until May 2021.  After starting hormonal therapy PSA is undetectable.  Choline PET scan in April 2022 still showed low-level activity in the iliac lymph nodes.  Question of small uptake in the thoracic vertebral body which was felt to  be not clearly metastatic on the MRI which was done subsequently.  Currently treatment with with Anti androgen therapy with Lupron alone.  2.  Was given recommendation to consider salvage radiation therapy to treat pelvic lymph nodes.  3.  Hormone deprivation side effects.  Justin is due for his Eligard.  4.  Musculoskeletal stiffness in his hands.       Plan      1.  Lupron 45 mg SQ/IM today.  2.  Continue monitoring of the PSA. We can also do another C-11 PET scan in August next year.  3.  Continue with alendronate to treat bone density.  4.  Continue good diet exercise.  5.  Continue with pandemic precautions.      Medical decision making      Moderately complex.      Risk      Significant risk of morbidity mortality.  Significant risk of side effects from intervention.       Cancer Staging   No matching staging information was found for the patient.        History of present illness        Mr. Justin Pires male with a history of elevated PSA. He is actually prior history of prostate cancer diagnosed in 1992 when he presented to [facility] for a executive physical and was found to have elevated PSA. He was completely is symptomatically that point. His workup then revealed that he indeed had prostate cancer. At that point at 47 years of age he underwent retropubic prostatectomy which revealed prostate cancer involving the left lobe of the prostate grade 2 out of 3 with negative margins and negative lymph nodes. He had been in remission ever since. In January 2016 he had a PSA checked on which actually came back at 10.7. It was thought at the time that he might have prostatitis. He was given some antibiotics. He then went down to [place]. There he had his PSA checked and was down to 6. He came back in April 2016 and had a repeat PSA checked which was elevated again at 15.7. Therefore Dr. Manjarrez asked him to come and see me.     He is also been seen by his urologist Dr. Fortunato Collazo. He had MRI CT scan and bone  scan done. None of them revealed any lesion.     He had a ProstaScint scan which actually showed some uptake on the right prostatic fossa.  I sent him to Dr. Browne's for another ultrasound-guided biopsy of his right prostate bed.  The biopsy was negative for any detectable prostate cancer.     He then was seen at the [facility] and got a C11 PET-choline image that showed metastatic disease with bone mets and lymph node mets. He had an axillary lymph node biopsied that was consistent with prostate cancer. He has been started on Lupron in June 2016 and in July 26th 2016 started on Taxotere. Had his first cycle end of July 2016. Tolerated it with expected side effects. His Psa basically became undetectable very quickly. He has finished 6 cycles. Last one in November 2016.     Had C11 PET scan at [facility] that showed near complete response.  Eventually stopped hormonal therapy sometime in spring 2018.  He had been followed by PSAs and C 11 choline PET scan.  His PSA started to rise in January 2021 it was 0.19.  His CA 11 choline PET scan was negative.     In April 2021 his PSA was 0.71.  His choline PET scan revealed several low-level tiny pelvic lymph nodes in the external and common iliac lymph node chains.  He started his hormonal therapy.  His testosterone level checked in 4/16/2021 showed normal testosterone level.     PSA checked on 10/5/2021 showed less than 0.1. His PET scan done on 10/4/2021 at [facility] still showed some persistent uptake in the iliac lymph nodes. He was recommended to consider radiation therapy to those lymph nodes. He is here to discuss that option.    However in October 2021 I recommended against radiation therapy.  Justin is happy with the recommendation.  Continued on Lupron therapy.  The PSA in the meantime has stayed undetectable.    Justin had another choline PET scan done at [facility] in April 2022.  It showed persistent uptake in the iliac lymph nodes.  However not a very high level of  uptake.  There was a question of an uptake in the thoracic vertebral body.  The thoracic vertebral body was further evaluated with the help of an MRI which suggested that this perhaps could be inflammatory from a humeral node rather than a metastatic lesion.    Justin had consult with Dr. Richard Rey radiation oncologist at [facility].  Justin was given option of pelvic radiation up to 64 cGy including the prostate bed versus combination of Lupron with abiraterone.  I recommended against radiation and proceeding with Anti androgen therapy with Lupron alone.  Justin has been doing exceedingly well.  PSA continues to be undetectable.  Choline PET scan done in August 2022 showed improvement in all the areas of the disease that was visible before.  No new areas.    Comes in today for his scheduled appointment for the injection.  No new side effects reported.  Some urinary symptoms which are to some extent expected.  Some dryness of the eyes.  No musculoskeletal symptoms of new nature.    Review of system      Details noted in the history of present illness.  A 14 point review of systems is otherwise negative.        Past medical history      Past Medical History:   Diagnosis Date     Gout      Hyperlipidemia      Hypothyroidism      Prostate cancer (H)        Past Surgical History:   Procedure Laterality Date     APPENDECTOMY       CATARACT EXTRACTION       COLONOSCOPY  12/11/2017     HERNIA REPAIR       PROSTATE SURGERY  1992     [facility] APPENDECTOMY      Description: Appendectomy;  Recorded: 03/17/2008;     [facility] REMV PROSTATE,RETROPUB,RAD,LTD NODES      Description: Prostatectomy Retropubic Radical With Lymph Node Biopsy(S);  Recorded: 03/25/2007;       Physical exam        /74   Pulse 62   Ht 1.829 m (6')   Wt 91.6 kg (202 lb)   SpO2 96%   BMI 27.40 kg/m    GENERAL: No acute distress. Cooperative in conversation.   HEENT:  Pupils are equal, round and reactive. Oral mucosa is clean and  intact. No ulcerations or mucositis noted. No bleeding noted.  RESP:Chest symmetric lungs are clear bilaterally per auscultation. Regular respiratory rate. No wheezes or rhonchi.  CV: Normal S1 S2 Regular, rate and rhythm. No murmurs.    ABD: Nondistended, soft, nontender. Positive bowel sounds. No organomegaly.   EXTREMITIES: No lower extremity edema.   NEURO: Non- focal. Alert and oriented x3.  Cranial nerves appear intact.  PSYCH: Within normal limits. No depression or anxiety.  SKIN: Warm dry intact.      Lab results      No results found for this or any previous visit (from the past 168 hour(s)).  Reviewed his PSA from [facility] which was less than 0.1.    Imaging results      Anatomical Region Laterality Modality   Body -- Positron Emission Tomography (PET)   Nuclear Medicine PET RST LOS -- Positron Emission Tomography (PET)   PET [facility] LOS -- --   Nuclear Medicine PET FLA LOS -- --   Nuclear Medicine -- --     Impression    1. Interval reduction in the mild increased choline uptake seen in the small left common iliac node.   No new choline uptake within the pelvic or abdominal nodes.   2. There has been interval significant improvement of the choline uptake previously noted in the T5   vertebral body. No increased choline uptake is seen in the T9 vertebral body.   3. No new increased choline uptake within the bones is noted.   4. No abnormal choline uptake is seen to indicate tumor recurrence in the prostate bed.  Narrative    EXAM:  PET CT CHOLINE     RADIOPHARMACEUTICAL/MEDS:   Route: intravenous   choline C 11 injection (CHOLINE C-11),13.21 millicurie     TECHNIQUE:  C-11 Choline PET/CT scan was performed from the orbits through the thighs with low dose,   non-contrast, free-breathing CT images for attenuation correction and anatomic localization (AC/AL),   with imaging beginning at approximately 5 minutes after radiotracer injection.     COMPARISON:  Previous choline PET scan dated 04/18/2022.      INDICATION:  Restaging prostate carcinoma. Rule out metastatic prostate carcinoma. Previous MR of   the thoracic spine showed possible metastasis and the superior T9 vertebral body. Previous choline   PET scan showed possible metastasis in the left iliac lymph node and T5 vertebral body. Subsequent   treatment strategy.     The patient reports no recent vaccinations.     FINDINGS:  Choline PET scan images show no increased choline uptake within the prostate bed or lower   pelvis. There is again mild increased tracer uptake within the left common iliac node (PET/CT image   236 on the current scan). The choline SUV maximum has decreased from the previous 1.7 to now measure   1.2 with the node again measuring 1.0 x 0.4 cm. No increased uptake seen in the remainder the pelvic   nodes.     Multiple tiny bilateral inguinal nodes with mild increased choline uptake are again noted and   unchanged from the previous scan. These appear to be nonspecific and possibly inflammatory.     No increased choline uptake seen in the abdominal or thoracic nodes.     Previous noted increased choline uptake in the T5 vertebral body has decreased over the interval   (PET/CT image 91) with the SUV maximum measuring 1.4 previously measuring 3.2. The choline uptake is   only minimally above background.     No increased choline uptake is seen in the T9 vertebral body correlate with the findings on the MR   scan from 04/22/2022. No additional or new increased choline uptake within the bones.     Additional findings on the noncontrast low-dose CT: No change from the previous scan.  Procedure Note      No results found.      Signed by: Turner Sahni MD,       This note has been dictated using voice recognition software. Any grammatical or context distortions are unintentional and inherent to the software        Again, thank you for allowing me to participate in the care of your patient.        Sincerely,        Turner Sahni MD, MD

## 2022-11-17 NOTE — PROGRESS NOTES
PT here ambulatory for eligard inj. Injection reviewed and administered in left upper arm/bandaid to site. Follow up reviewed with pt and pt dc'd steady gait.

## 2022-11-18 DIAGNOSIS — E03.4 HYPOTHYROIDISM DUE TO ACQUIRED ATROPHY OF THYROID: ICD-10-CM

## 2022-11-19 NOTE — TELEPHONE ENCOUNTER
"Routing refill request to provider for review/approval because:  Labs not current:  tsh  Patient needs to be seen because it has been more than 1 year since last office visit.    Last Written Prescription Date:  8/23/22  Last Fill Quantity: 90,  # refills: 0   Last office visit provider:  8/23/22     Requested Prescriptions   Pending Prescriptions Disp Refills     levothyroxine (SYNTHROID/LEVOTHROID) 125 MCG tablet [Pharmacy Med Name: Levothyroxine Sodium Oral Tablet 125 MCG] 90 tablet 0     Sig: TAKE ONE TABLET BY MOUTH ONE TIME DAILY       Thyroid Protocol Failed - 11/18/2022  9:10 AM        Failed - Recent (12 mo) or future (30 days) visit within the authorizing provider's specialty     Patient has had an office visit with the authorizing provider or a provider within the authorizing providers department within the previous 12 mos or has a future within next 30 days. See \"Patient Info\" tab in inbasket, or \"Choose Columns\" in Meds & Orders section of the refill encounter.              Failed - Normal TSH on file in past 12 months     Recent Labs   Lab Test 09/03/21  1433   TSH 0.05*              Passed - Patient is 12 years or older        Passed - Medication is active on med list             Bud, Vesna, RN 11/19/22 11:39 AM  "

## 2022-11-20 RX ORDER — LEVOTHYROXINE SODIUM 125 UG/1
TABLET ORAL
Qty: 90 TABLET | Refills: 0 | Status: SHIPPED | OUTPATIENT
Start: 2022-11-20 | End: 2023-02-23

## 2022-11-22 ENCOUNTER — IMMUNIZATION (OUTPATIENT)
Dept: NURSING | Facility: CLINIC | Age: 77
End: 2022-11-22
Payer: COMMERCIAL

## 2022-11-22 DIAGNOSIS — R19.7 DIARRHEA OF PRESUMED INFECTIOUS ORIGIN: Primary | ICD-10-CM

## 2022-11-22 PROCEDURE — G0008 ADMIN INFLUENZA VIRUS VAC: HCPCS

## 2022-11-22 PROCEDURE — 91313 COVID-19 VACCINE BIVALENT BOOSTER 18+ (MODERNA): CPT

## 2022-11-22 PROCEDURE — 0134A COVID-19 VACCINE BIVALENT BOOSTER 18+ (MODERNA): CPT

## 2022-11-22 PROCEDURE — 90662 IIV NO PRSV INCREASED AG IM: CPT

## 2022-12-01 ENCOUNTER — MYC MEDICAL ADVICE (OUTPATIENT)
Dept: INTERNAL MEDICINE | Facility: CLINIC | Age: 77
End: 2022-12-01

## 2022-12-01 ENCOUNTER — LAB (OUTPATIENT)
Dept: LAB | Facility: CLINIC | Age: 77
End: 2022-12-01
Payer: COMMERCIAL

## 2022-12-01 DIAGNOSIS — F32.5 MAJOR DEPRESSIVE DISORDER WITH SINGLE EPISODE, IN REMISSION (H): ICD-10-CM

## 2022-12-01 DIAGNOSIS — H40.002 GLAUCOMA SUSPECT, LEFT: ICD-10-CM

## 2022-12-01 DIAGNOSIS — E78.00 PURE HYPERCHOLESTEROLEMIA: ICD-10-CM

## 2022-12-01 DIAGNOSIS — G62.9 NEUROPATHY: ICD-10-CM

## 2022-12-01 DIAGNOSIS — R19.7 DIARRHEA OF PRESUMED INFECTIOUS ORIGIN: ICD-10-CM

## 2022-12-01 LAB — C DIFF TOX B STL QL: NEGATIVE

## 2022-12-01 PROCEDURE — 87209 SMEAR COMPLEX STAIN: CPT

## 2022-12-01 PROCEDURE — 87177 OVA AND PARASITES SMEARS: CPT

## 2022-12-01 PROCEDURE — 87493 C DIFF AMPLIFIED PROBE: CPT

## 2022-12-02 DIAGNOSIS — F32.5 MAJOR DEPRESSIVE DISORDER WITH SINGLE EPISODE, IN REMISSION (H): ICD-10-CM

## 2022-12-02 DIAGNOSIS — M89.9 DISORDER OF BONE AND CARTILAGE: ICD-10-CM

## 2022-12-02 DIAGNOSIS — G62.9 NEUROPATHY: ICD-10-CM

## 2022-12-02 DIAGNOSIS — E78.00 PURE HYPERCHOLESTEROLEMIA: ICD-10-CM

## 2022-12-02 DIAGNOSIS — M94.9 DISORDER OF BONE AND CARTILAGE: ICD-10-CM

## 2022-12-02 LAB — O+P STL MICRO: NEGATIVE

## 2022-12-02 RX ORDER — ROSUVASTATIN CALCIUM 10 MG/1
TABLET, COATED ORAL
Qty: 90 TABLET | Refills: 3 | Status: SHIPPED | OUTPATIENT
Start: 2022-12-02 | End: 2024-03-21

## 2022-12-02 RX ORDER — LATANOPROST 50 UG/ML
SOLUTION/ DROPS OPHTHALMIC
Qty: 7.5 ML | Refills: 3 | Status: SHIPPED | OUTPATIENT
Start: 2022-12-02 | End: 2023-05-30

## 2022-12-02 RX ORDER — ESCITALOPRAM OXALATE 10 MG/1
TABLET ORAL
Qty: 90 TABLET | Refills: 3 | Status: SHIPPED | OUTPATIENT
Start: 2022-12-02 | End: 2023-10-03

## 2022-12-04 RX ORDER — ROSUVASTATIN CALCIUM 10 MG/1
TABLET, COATED ORAL
Qty: 90 TABLET | Refills: 0 | OUTPATIENT
Start: 2022-12-04

## 2022-12-04 RX ORDER — ESCITALOPRAM OXALATE 10 MG/1
TABLET ORAL
Qty: 90 TABLET | Refills: 0 | OUTPATIENT
Start: 2022-12-04

## 2022-12-05 RX ORDER — ALENDRONATE SODIUM 70 MG/1
TABLET ORAL
Qty: 12 TABLET | Refills: 0 | Status: SHIPPED | OUTPATIENT
Start: 2022-12-05 | End: 2023-01-16

## 2022-12-05 NOTE — TELEPHONE ENCOUNTER
"Routing Alendronate refill request to provider for review/approval because:     Patient needs to be seen because it has been more than 1 year since last office visit.    Last Written Prescription Date: 8/23/2022  Last Fill Quantity:12,  # refills:0   Last office visit provider: Virtual visit 8/23/2021 with PCP Dr KEYON Manjarrez     Note: message sent to pharmacy for refill requests of Crestor and Lexapro: both were reordered 12/2/2022.      Requested Prescriptions   Pending Prescriptions Disp Refills     alendronate (FOSAMAX) 70 MG tablet [Pharmacy Med Name: Alendronate Sodium Oral Tablet 70 MG] 12 tablet 0     Sig: TAKE 1 TABLET BY MOUTH ONCE WEEKLY       Bisphosphonates Failed - 12/2/2022 11:22 AM        Failed - Recent (12 mo) or future (30 days) visit within the authorizing provider's specialty     Patient has had an office visit with the authorizing provider or a provider within the authorizing providers department within the previous 12 mos or has a future within next 30 days. See \"Patient Info\" tab in inbasket, or \"Choose Columns\" in Meds & Orders section of the refill encounter.              Failed - Normal serum creatinine on file within past 12 months     Recent Labs   Lab Test 09/03/21  1433   CR 0.76       Ok to refill medication if creatinine is low          Passed - Dexa on file within past 2 years     Please review last Dexa result.           Passed - Medication is active on med list        Passed - Patient is age 18 or older           rosuvastatin (CRESTOR) 10 MG tablet [Pharmacy Med Name: Rosuvastatin Calcium Oral Tablet 10 MG] 90 tablet 0     Sig: TAKE ONE TABLET BY MOUTH ONE TIME DAILY       Statins Protocol Failed - 12/2/2022 11:22 AM        Failed - LDL on file in past 12 months     Recent Labs   Lab Test 09/03/21  1433                Failed - Recent (12 mo) or future (30 days) visit within the authorizing provider's specialty     Patient has had an office visit with the authorizing provider " "or a provider within the authorizing providers department within the previous 12 mos or has a future within next 30 days. See \"Patient Info\" tab in inbasket, or \"Choose Columns\" in Meds & Orders section of the refill encounter.              Passed - No abnormal creatine kinase in past 12 months     No lab results found.             Passed - Medication is active on med list        Passed - Patient is age 18 or older           escitalopram (LEXAPRO) 10 MG tablet [Pharmacy Med Name: Escitalopram Oxalate Oral Tablet 10 MG] 90 tablet 0     Sig: TAKE ONE TABLET BY MOUTH ONE TIME DAILY       SSRIs Protocol Failed - 12/2/2022 11:22 AM        Failed - PHQ-9 score less than 5 in past 6 months     Please review last PHQ-9 score.           Failed - Recent (6 mo) or future (30 days) visit within the authorizing provider's specialty     Patient had office visit in the last 6 months or has a visit in the next 30 days with authorizing provider or within the authorizing provider's specialty.  See \"Patient Info\" tab in inbasket, or \"Choose Columns\" in Meds & Orders section of the refill encounter.            Passed - Medication is active on med list        Passed - Patient is age 18 or older             Mary Rothman RN 12/04/22 6:05 PM  "

## 2022-12-24 ENCOUNTER — MYC MEDICAL ADVICE (OUTPATIENT)
Dept: INTERNAL MEDICINE | Facility: CLINIC | Age: 77
End: 2022-12-24

## 2022-12-27 DIAGNOSIS — S43.006S DISLOCATION OF SHOULDER REGION, UNSPECIFIED LATERALITY, SEQUELA: Primary | ICD-10-CM

## 2022-12-29 NOTE — TELEPHONE ENCOUNTER
DIAGNOSIS: Dislocation of shoulder region, unspecified laterality, sequela \ [facility]\ [facility]\ ortho con     APPOINTMENT DATE: 1.5.23   NOTES STATUS DETAILS   OFFICE NOTE from referring provider Internal 12.27.22 Jalil Manjarrez MD     OFFICE NOTE from other specialist Internal [facility]:  12.7.22 Coty Galarza M.B.B.S.   MEDICATION LIST Internal    XRAYS (IMAGES & REPORTS) [facility]:  12.7.22 R shoulder  12.7.22 R humerus       Action December 29, 2022 10:46 AM [facility]   Action Taken Faxed request xray images to [facility]. Fax [phone]     1.3.23 9:04 AM -- Images are in [facility]. Click on the date of images and scroll to the bottom where there is a blue link to under Retrieving doc. That link should direct you to the image.

## 2023-01-05 ENCOUNTER — PRE VISIT (OUTPATIENT)
Dept: ORTHOPEDICS | Facility: CLINIC | Age: 78
End: 2023-01-05

## 2023-01-05 DIAGNOSIS — M25.511 RIGHT SHOULDER PAIN: Primary | ICD-10-CM

## 2023-01-11 DIAGNOSIS — M94.9 DISORDER OF BONE AND CARTILAGE: ICD-10-CM

## 2023-01-11 DIAGNOSIS — M89.9 DISORDER OF BONE AND CARTILAGE: ICD-10-CM

## 2023-01-12 RX ORDER — ALENDRONATE SODIUM 70 MG/1
TABLET ORAL
Qty: 12 TABLET | Refills: 0 | OUTPATIENT
Start: 2023-01-12

## 2023-01-13 DIAGNOSIS — M89.9 DISORDER OF BONE AND CARTILAGE: ICD-10-CM

## 2023-01-13 DIAGNOSIS — M94.9 DISORDER OF BONE AND CARTILAGE: ICD-10-CM

## 2023-01-16 RX ORDER — ALENDRONATE SODIUM 70 MG/1
TABLET ORAL
Qty: 12 TABLET | Refills: 0 | Status: SHIPPED | OUTPATIENT
Start: 2023-01-16 | End: 2023-04-12

## 2023-01-16 NOTE — TELEPHONE ENCOUNTER
"Routing refill request to provider for review/approval because:  Patient needs to be seen because it has been more than 1 year since last office visit.    Last Written Prescription Date:  12/5/22  Last Fill Quantity: 12,  # refills: 0   Last office visit provider:  8/23/21     Requested Prescriptions   Pending Prescriptions Disp Refills     alendronate (FOSAMAX) 70 MG tablet [Pharmacy Med Name: Alendronate Sodium Oral Tablet 70 MG] 12 tablet 0     Sig: TAKE ONE TABLET BY MOUTH WEEKLY       Bisphosphonates Failed - 1/13/2023  4:23 PM        Failed - Recent (12 mo) or future (30 days) visit within the authorizing provider's specialty     Patient has had an office visit with the authorizing provider or a provider within the authorizing providers department within the previous 12 mos or has a future within next 30 days. See \"Patient Info\" tab in inbasket, or \"Choose Columns\" in Meds & Orders section of the refill encounter.              Failed - Normal serum creatinine on file within past 12 months     Recent Labs   Lab Test 09/03/21  1433   CR 0.76       Ok to refill medication if creatinine is low          Passed - Dexa on file within past 2 years     Please review last Dexa result.           Passed - Medication is active on med list        Passed - Patient is age 18 or older             Vesna Sims RN 01/16/23 10:05 AM  "

## 2023-01-28 ENCOUNTER — HEALTH MAINTENANCE LETTER (OUTPATIENT)
Age: 78
End: 2023-01-28

## 2023-02-22 DIAGNOSIS — E03.4 HYPOTHYROIDISM DUE TO ACQUIRED ATROPHY OF THYROID: ICD-10-CM

## 2023-02-23 RX ORDER — LEVOTHYROXINE SODIUM 125 UG/1
TABLET ORAL
Qty: 90 TABLET | Refills: 0 | Status: SHIPPED | OUTPATIENT
Start: 2023-02-23 | End: 2023-04-12

## 2023-02-23 NOTE — TELEPHONE ENCOUNTER
"Routing refill request to provider for review/approval because:  Labs not current:  TSH  Patient needs to be seen because it has been more than 1 year since last office visit.    Last Written Prescription Date:  11/20/22  Last Fill Quantity: 90,  # refills: 0   Last office visit provider:  8/23/21 - virtual visit with Dr. Manjarrez - Medicare annual wellness     Requested Prescriptions   Pending Prescriptions Disp Refills     levothyroxine (SYNTHROID/LEVOTHROID) 125 MCG tablet [Pharmacy Med Name: Levothyroxine Sodium Oral Tablet 125 MCG] 90 tablet 0     Sig: TAKE ONE TABLET BY MOUTH ONE TIME DAILY       Thyroid Protocol Failed - 2/22/2023  9:05 AM        Failed - Recent (12 mo) or future (30 days) visit within the authorizing provider's specialty     Patient has had an office visit with the authorizing provider or a provider within the authorizing providers department within the previous 12 mos or has a future within next 30 days. See \"Patient Info\" tab in inbasket, or \"Choose Columns\" in Meds & Orders section of the refill encounter.              Failed - Normal TSH on file in past 12 months     Recent Labs   Lab Test 09/03/21  1433   TSH 0.05*              Passed - Patient is 12 years or older        Passed - Medication is active on med list             Teresa Joe RN 02/23/23 11:29 AM  "

## 2023-02-23 NOTE — TELEPHONE ENCOUNTER
02/23 sent a my chert message to pt to schedule an appt virtual or in person, to continue to refill medication

## 2023-03-13 LAB — PSA SERPL DL<=0.01 NG/ML-MCNC: <0.01 NG/ML (ref 0–6.5)

## 2023-03-17 ENCOUNTER — TELEPHONE (OUTPATIENT)
Dept: INTERNAL MEDICINE | Facility: CLINIC | Age: 78
End: 2023-03-17
Payer: COMMERCIAL

## 2023-03-17 NOTE — TELEPHONE ENCOUNTER
General Call    Contacts       Type Contact Phone/Fax    03/17/2023 11:59 AM CDT Phone (Incoming) Justin Pires (Self) [phone] (H)        Reason for Call:  Gout in toe    What are your questions or concerns:  Patient stated that Dr. Manjarrez in the past had prescribed Probenecid 500 mg, and he is asking if Dr. Manjarrez could send a new prescription in to his pharmacy(pharmacy updated) for this    Date of last appointment with provider: n/a     would you prefer to receive a phone call?:   Patient would prefer a phone call     Okay to leave a detailed message?: Yes at Home number on file [phone] (home)

## 2023-04-12 ENCOUNTER — VIRTUAL VISIT (OUTPATIENT)
Dept: INTERNAL MEDICINE | Facility: CLINIC | Age: 78
End: 2023-04-12
Payer: COMMERCIAL

## 2023-04-12 DIAGNOSIS — Z00.00 PREVENTATIVE HEALTH CARE: ICD-10-CM

## 2023-04-12 DIAGNOSIS — F32.5 MAJOR DEPRESSIVE DISORDER WITH SINGLE EPISODE, IN REMISSION (H): ICD-10-CM

## 2023-04-12 DIAGNOSIS — C77.5 PROSTATE CANCER METASTATIC TO INTRAPELVIC LYMPH NODE (H): ICD-10-CM

## 2023-04-12 DIAGNOSIS — M89.9 DISORDER OF BONE AND CARTILAGE: ICD-10-CM

## 2023-04-12 DIAGNOSIS — E03.4 HYPOTHYROIDISM DUE TO ACQUIRED ATROPHY OF THYROID: ICD-10-CM

## 2023-04-12 DIAGNOSIS — G62.9 NEUROPATHY: Primary | ICD-10-CM

## 2023-04-12 DIAGNOSIS — Z79.83 LONG TERM (CURRENT) USE OF BISPHOSPHONATES: ICD-10-CM

## 2023-04-12 DIAGNOSIS — C61 PROSTATE CANCER METASTATIC TO INTRAPELVIC LYMPH NODE (H): ICD-10-CM

## 2023-04-12 DIAGNOSIS — E78.00 PURE HYPERCHOLESTEROLEMIA: ICD-10-CM

## 2023-04-12 DIAGNOSIS — M94.9 DISORDER OF BONE AND CARTILAGE: ICD-10-CM

## 2023-04-12 PROBLEM — R97.21 RISING PSA FOLLOWING TREATMENT FOR MALIGNANT NEOPLASM OF PROSTATE: Status: ACTIVE | Noted: 2021-10-07

## 2023-04-12 PROCEDURE — 96127 BRIEF EMOTIONAL/BEHAV ASSMT: CPT | Mod: VID | Performed by: INTERNAL MEDICINE

## 2023-04-12 PROCEDURE — 99215 OFFICE O/P EST HI 40 MIN: CPT | Mod: VID | Performed by: INTERNAL MEDICINE

## 2023-04-12 RX ORDER — AMOXICILLIN 500 MG/1
CAPSULE ORAL
COMMUNITY
Start: 2023-01-03 | End: 2024-07-09

## 2023-04-12 RX ORDER — LEVOTHYROXINE SODIUM 125 UG/1
125 TABLET ORAL DAILY
Qty: 90 TABLET | Refills: 3 | Status: SHIPPED | OUTPATIENT
Start: 2023-04-12 | End: 2024-04-15

## 2023-04-12 RX ORDER — OMEGA-3 FATTY ACIDS/FISH OIL 300-1000MG
600 CAPSULE ORAL 2 TIMES DAILY
COMMUNITY
Start: 2023-04-12 | End: 2024-03-21

## 2023-04-12 RX ORDER — ALENDRONATE SODIUM 70 MG/1
TABLET ORAL
Qty: 12 TABLET | Refills: 3 | Status: SHIPPED | OUTPATIENT
Start: 2023-04-12 | End: 2024-03-19

## 2023-04-12 ASSESSMENT — PATIENT HEALTH QUESTIONNAIRE - PHQ9
SUM OF ALL RESPONSES TO PHQ QUESTIONS 1-9: 0
10. IF YOU CHECKED OFF ANY PROBLEMS, HOW DIFFICULT HAVE THESE PROBLEMS MADE IT FOR YOU TO DO YOUR WORK, TAKE CARE OF THINGS AT HOME, OR GET ALONG WITH OTHER PEOPLE: NOT DIFFICULT AT ALL
SUM OF ALL RESPONSES TO PHQ QUESTIONS 1-9: 0

## 2023-04-12 NOTE — PROGRESS NOTES
"Justin is a 77 year old who is being evaluated via a billable video visit.      How would you like to obtain your AVS? MyChart  If the video visit is dropped, the invitation should be resent by: Text to cell phone: [phone]  Will anyone else be joining your video visit? No  {If patient encounters technical issues they should call [phone] :515602}        {PROVIDER CHARTING PREFERENCE:525125}    Subjective   Justin is a 77 year old, presenting for the following health issues:  Pain and Recheck Medication (Pt reports that he has pain off and on in his left foot that feels like stabbing pain, and mostly at night.)         View : No data to display.              Pain         Pt reports that he has continuous pain in his left foot.  {additonal problems for provider to add (Optional):718653}      Review of Systems   {ROS COMP (Optional):374077}      Objective           Vitals:  No vitals were obtained today due to virtual visit.    Physical Exam   {video visit exam brief selected:654658::\"GENERAL: Healthy, alert and no distress\",\"EYES: Eyes grossly normal to inspection.  No discharge or erythema, or obvious scleral/conjunctival abnormalities.\",\"RESP: No audible wheeze, cough, or visible cyanosis.  No visible retractions or increased work of breathing.  \",\"SKIN: Visible skin clear. No significant rash, abnormal pigmentation or lesions.\",\"NEURO: Cranial nerves grossly intact.  Mentation and speech appropriate for age.\",\"PSYCH: Mentation appears normal, affect normal/bright, judgement and insight intact, normal speech and appearance well-groomed.\"}    {Diagnostic Test Results (Optional):699294}    {AMBULATORY ATTESTATION (Optional):455598}        Video-Visit Details    Type of service:  Video Visit   Video Start Time: {video visit start/end time for provider to select:416958}  Video End Time:{video visit start/end time for provider to select:100071}    Originating Location (pt. Location): Home  {PROVIDER LOCATION On-site " should be selected for visits conducted from your clinic location or adjoining [facility] hospital, academic office, or other nearby [facility] building. Off-site should be selected for all other provider locations, including home:192271}  Distant Location (provider location):  Off-site  Platform used for Video Visit: Deanne

## 2023-04-12 NOTE — PATIENT INSTRUCTIONS
Update labs to include TSH and lipid profile    Other labs to mail    Bone density    Refill levothyroxine 125 and alendronate    Suspect lumbar 4 disc with radiculopathy    Ibuprofen 600 mg twice daily for 5 days    Gabapentin 100 mg daily as needed-may take wife's supply    Follow-up through [facility] and oncology

## 2023-04-12 NOTE — PROGRESS NOTES
Justin is a 77 year old male contacting the clinic today via video, who will use the platform: Siasto for the visit.  Phone # for Doximity, or if [facility] drops:   Telephone Information:   Mobile [phone]          ASSESSMENT and PLAN:  1. Neuropathy  Suspect compression neuropathy from lumbar 4 disc.  Consider popliteal or tarsal tunnel.  Treat and monitor  - ibuprofen (ADVIL/MOTRIN) 200 MG capsule; Take 3 capsules (600 mg) by mouth 2 times daily    2. 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  Reviewed PSA and [facility]    3. Major depressive disorder with single episode, in remission (H)  Stable on current medicines    4. Preventative health care  Stable and up-to-date  - REVIEW OF HEALTH MAINTENANCE PROTOCOL ORDERS    5. Hypothyroidism due to acquired atrophy of thyroid  Reviewed lowering of dose 1 year ago.  Recommend updating TSH and refill  - levothyroxine (SYNTHROID/LEVOTHROID) 125 MCG tablet; Take 1 tablet (125 mcg) by mouth daily  Dispense: 90 tablet; Refill: 3  - TSH; Future    6. Pure hypercholesterolemia  Recommend cholesterol profile  - Lipid Profile; Future    7. Osteopenia  Refill alendronate and update bone density  - alendronate (FOSAMAX) 70 MG tablet; TAKE ONE TABLET BY MOUTH WEEKLY  Dispense: 12 tablet; Refill: 3  - DEXA HIP/PELVIS/SPINE - Future; Future    8. Long term (current) use of bisphosphonates  - DEXA HIP/PELVIS/SPINE - Future; Future       Patient Instructions   Update labs to include TSH and lipid profile    Other labs to mail    Bone density    Refill levothyroxine 125 and alendronate    Suspect lumbar 4 disc with radiculopathy    Ibuprofen 600 mg twice daily for 5 days    Gabapentin 100 mg daily as needed-may take wife's supply    Follow-up through [facility] and oncology            Return in about 6 months (around 10/12/2023) for using a video visit.       CHIEF COMPLAINT:  Chief Complaint   Patient presents with     Pain     Recheck  Medication     Pt reports that he has pain off and on in his left foot that feels like stabbing pain, and mostly at night.       HISTORY OF PRESENT ILLNESS:  Justin is a 77 year old male contacting the clinic today via video for complaints of numbness.  He reports numbness that began on the top of his left foot approximately 1 week ago.  No specific injury.  Has read about neuropathy but has this and no other place.  Although initially denies foot, ankle or back pain, does agree to back pain and endorses a lot of lifting and chores over the last several weeks.  No rash or bruise.  100 mg of his wife's gabapentin helped nicely    Being [facility] for prostate cancer at the [facility].  Underwent prostatectomy 1992 with recurrence recently.  Debated between radiation and hormonal therapy and now currently on hormonal therapy.  Feels well    Mood and energy are stable    Dislocated his shoulder.  Corresponded through MyChart and advice given.  Healing well without surgery    On alendronate for osteoporosis    Thyroid dose decreased in September 2021 and has not had TSH updated since that time    History of Present Illness       Reason for visit:  Foot pain    He eats 2-3 servings of fruits and vegetables daily.He consumes 0 sweetened beverage(s) daily.He exercises with enough effort to increase his heart rate 20 to 29 minutes per day.  He exercises with enough effort to increase his heart rate 7 days per week.   He is taking medications regularly.    Today's PHQ-9         PHQ-9 Total Score: 0    PHQ-9 Q9 Thoughts of better off dead/self-harm past 2 weeks :   Not at all    How difficult have these problems made it for you to do your work, take care of things at home, or get along with other people: Not difficult at all      REVIEW OF SYSTEMS:   Otherwise feels very well    PFSH:  Social History     Social History Narrative     of [employer] in [place], now retired        Lives with a significant other.   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  There were no vitals taken for this visit. Estimated body mass index is 27.4 kg/m  as calculated from the following:    Height as of 11/17/22: 1.829 m (6').    Weight as of 11/17/22: 91.6 kg (202 lb).    PHYSICAL EXAM:  (observations via Video)  Alert and oriented    MEDICATIONS:   Current Outpatient Medications   Medication Sig Dispense Refill     alendronate (FOSAMAX) 70 MG tablet TAKE ONE TABLET BY MOUTH WEEKLY 12 tablet 3     aspirin 81 MG EC tablet Take 81 mg by mouth daily       azelaic acid (FINACIA) 15 % external gel AFTER SKIN IS THROUGHLY WASHED AND DRY, GENTLY & THOROUGHLY MASSAGE A THIN FILM INTO AFFECTED AREA TWICE DAILY. 50 g 0     clotrimazole (LOTRIMIN) 1 % external cream Apply topically 2 times daily 30 g 11     escitalopram (LEXAPRO) 10 MG tablet [ESCITALOPRAM OXALATE (LEXAPRO) 10 MG TABLET] TAKE ONE TABLET BY MOUTH ONE TIME DAILY 90 tablet 3     ibuprofen (ADVIL/MOTRIN) 200 MG capsule Take 3 capsules (600 mg) by mouth 2 times daily       latanoprost (XALATAN) 0.005 % ophthalmic solution [LATANOPROST (XALATAN) 0.005 % OPHTHALMIC SOLUTION] 7.5 mL 3     levothyroxine (SYNTHROID/LEVOTHROID) 125 MCG tablet Take 1 tablet (125 mcg) by mouth daily 90 tablet 3     mupirocin (BACTROBAN) 2 % external ointment Apply a small amount to irritated area on penis three times daily until resolved. 30 g 3     rosuvastatin (CRESTOR) 10 MG tablet TAKE ONE TABLET BY MOUTH ONE TIME DAILY 90 tablet 3     amoxicillin (AMOXIL) 500 MG capsule          Outside Notes summarized: [facility] oncology.  Local oncology.  MyChart x5  Labs, x-rays, cardiology, GI tests reviewed: Labs as below.  Bone density  Recent  Labs   Lab Test 11/17/22  0829 07/19/22  1006 12/01/21  1300 09/03/21  1433   NA  --   --   --  140   POTASSIUM  --   --   --  4.3   CR  --   --   --  0.76   PSA <0.01 <0.01   < >  --    B12  --   --   --  1,576*   TSH  --   --   --  0.05*   VITDT  --   --   --  30    < > = values in this interval not displayed.     No results found for: [id number]  Lab Results   Component Value Date    CHOL 191 09/03/2021     New orders:   Orders Placed This Encounter   Procedures     REVIEW OF HEALTH MAINTENANCE PROTOCOL ORDERS     DEXA HIP/PELVIS/SPINE - Future     Lipid Profile     TSH       Independent review of:     Jalil Manjarrez MD  [facility]    Video-Visit Details  Type of service:  Video Visit  Patient has given verbal consent to a Video visit?  Yes  How would you like to obtain your AVS?  MyChart  Will anyone else be joining your video visit, giving supplemental history? No    Originating location (pt location): At [facility]    Distant Location (provider location):  Off-site    Video Start Time: 9:43 AM  Video End time:  10:26 AM  Face to face plus orders: 43 minutes  Documentation time:  3 minutes     The visit lasted a total of 46 minutes

## 2023-05-01 ENCOUNTER — TELEPHONE (OUTPATIENT)
Dept: ONCOLOGY | Facility: HOSPITAL | Age: 78
End: 2023-05-01
Payer: COMMERCIAL

## 2023-05-18 ENCOUNTER — TELEPHONE (OUTPATIENT)
Dept: ONCOLOGY | Facility: HOSPITAL | Age: 78
End: 2023-05-18
Payer: COMMERCIAL

## 2023-05-19 ENCOUNTER — TELEPHONE (OUTPATIENT)
Dept: ONCOLOGY | Facility: HOSPITAL | Age: 78
End: 2023-05-19
Payer: COMMERCIAL

## 2023-05-30 DIAGNOSIS — H40.002 GLAUCOMA SUSPECT, LEFT: ICD-10-CM

## 2023-05-30 RX ORDER — LATANOPROST 50 UG/ML
SOLUTION/ DROPS OPHTHALMIC
Qty: 7.5 ML | Refills: 3 | Status: SHIPPED | OUTPATIENT
Start: 2023-05-30 | End: 2023-05-31

## 2023-05-30 NOTE — TELEPHONE ENCOUNTER
General Call    Contacts       Type Contact Phone/Fax    05/30/2023 09:50 AM CDT Phone (Incoming) Justin Pires (Self) [phone] (H)        Reason for Call:  Patient out of medication    What are your questions or concerns:  Patient is traveling and is requesting medication today, and would like a phone call when medication is sent to pharmacy    Date of last appointment with provider: n/a     would you prefer to receive a phone call?:   Patient would prefer a phone call     Okay to leave a detailed message?: Yes at Home number on file [phone] ([place])

## 2023-05-31 ENCOUNTER — TELEPHONE (OUTPATIENT)
Dept: INTERNAL MEDICINE | Facility: CLINIC | Age: 78
End: 2023-05-31
Payer: COMMERCIAL

## 2023-05-31 DIAGNOSIS — H40.002 GLAUCOMA SUSPECT, LEFT: ICD-10-CM

## 2023-05-31 RX ORDER — LATANOPROST 50 UG/ML
1 SOLUTION/ DROPS OPHTHALMIC DAILY
Qty: 7.5 ML | Refills: 3 | Status: SHIPPED | OUTPATIENT
Start: 2023-05-31 | End: 2023-12-21

## 2023-05-31 NOTE — TELEPHONE ENCOUNTER
General Call      Reason for Call:  Pharmacy calling to get directions on:  Latanoprost solution - Eye drop    Please advise    What are your questions or concerns:  n/a    Date of last appointment with provider: n/a    Could we send this information to you in [facility] or would you prefer to receive a phone call?:   No preference   Okay to leave a detailed message?: Yes at Other phone number:  [phone]

## 2023-06-15 ENCOUNTER — ONCOLOGY VISIT (OUTPATIENT)
Dept: ONCOLOGY | Facility: HOSPITAL | Age: 78
End: 2023-06-15
Attending: INTERNAL MEDICINE
Payer: COMMERCIAL

## 2023-06-15 ENCOUNTER — LAB (OUTPATIENT)
Dept: INFUSION THERAPY | Facility: HOSPITAL | Age: 78
End: 2023-06-15
Attending: INTERNAL MEDICINE
Payer: COMMERCIAL

## 2023-06-15 VITALS
BODY MASS INDEX: 27.83 KG/M2 | WEIGHT: 205.5 LBS | HEART RATE: 66 BPM | TEMPERATURE: 97.8 F | OXYGEN SATURATION: 93 % | HEIGHT: 72 IN | DIASTOLIC BLOOD PRESSURE: 88 MMHG | RESPIRATION RATE: 18 BRPM | SYSTOLIC BLOOD PRESSURE: 133 MMHG

## 2023-06-15 DIAGNOSIS — C77.5 PROSTATE CANCER METASTATIC TO INTRAPELVIC LYMPH NODE (H): ICD-10-CM

## 2023-06-15 DIAGNOSIS — C61 PROSTATE CANCER METASTATIC TO INTRAPELVIC LYMPH NODE (H): Primary | ICD-10-CM

## 2023-06-15 DIAGNOSIS — C61 PROSTATE CANCER METASTATIC TO INTRAPELVIC LYMPH NODE (H): ICD-10-CM

## 2023-06-15 DIAGNOSIS — C77.5 PROSTATE CANCER METASTATIC TO INTRAPELVIC LYMPH NODE (H): Primary | ICD-10-CM

## 2023-06-15 DIAGNOSIS — C41.2 MALIGNANT NEOPLASM OF VERTEBRAL COLUMN, EXCLUDING SACRUM AND COCCYX (H): ICD-10-CM

## 2023-06-15 LAB
ALBUMIN SERPL BCG-MCNC: 4.4 G/DL (ref 3.5–5.2)
ALP SERPL-CCNC: 53 U/L (ref 40–129)
ALT SERPL W P-5'-P-CCNC: 30 U/L (ref 0–70)
ANION GAP SERPL CALCULATED.3IONS-SCNC: 12 MMOL/L (ref 7–15)
AST SERPL W P-5'-P-CCNC: 36 U/L (ref 0–45)
BILIRUB SERPL-MCNC: 0.8 MG/DL
BUN SERPL-MCNC: 15.2 MG/DL (ref 8–23)
CALCIUM SERPL-MCNC: 9.5 MG/DL (ref 8.8–10.2)
CHLORIDE SERPL-SCNC: 102 MMOL/L (ref 98–107)
CREAT SERPL-MCNC: 0.89 MG/DL (ref 0.67–1.17)
DEPRECATED HCO3 PLAS-SCNC: 24 MMOL/L (ref 22–29)
ERYTHROCYTE [DISTWIDTH] IN BLOOD BY AUTOMATED COUNT: 11.7 % (ref 10–15)
GFR SERPL CREATININE-BSD FRML MDRD: 88 ML/MIN/1.73M2
GLUCOSE SERPL-MCNC: 100 MG/DL (ref 70–99)
HCT VFR BLD AUTO: 42.4 % (ref 40–53)
HGB BLD-MCNC: 15.2 G/DL (ref 13.3–17.7)
MCH RBC QN AUTO: 34.2 PG (ref 26.5–33)
MCHC RBC AUTO-ENTMCNC: 35.8 G/DL (ref 31.5–36.5)
MCV RBC AUTO: 95 FL (ref 78–100)
PLATELET # BLD AUTO: 230 10E3/UL (ref 150–450)
POTASSIUM SERPL-SCNC: 4.4 MMOL/L (ref 3.4–5.3)
PROT SERPL-MCNC: 6.8 G/DL (ref 6.4–8.3)
PSA SERPL DL<=0.01 NG/ML-MCNC: <0.01 NG/ML (ref 0–6.5)
RBC # BLD AUTO: 4.45 10E6/UL (ref 4.4–5.9)
SODIUM SERPL-SCNC: 138 MMOL/L (ref 136–145)
WBC # BLD AUTO: 5.7 10E3/UL (ref 4–11)

## 2023-06-15 PROCEDURE — 99214 OFFICE O/P EST MOD 30 MIN: CPT | Performed by: INTERNAL MEDICINE

## 2023-06-15 PROCEDURE — 250N000011 HC RX IP 250 OP 636: Performed by: INTERNAL MEDICINE

## 2023-06-15 PROCEDURE — 80053 COMPREHEN METABOLIC PANEL: CPT

## 2023-06-15 PROCEDURE — G0463 HOSPITAL OUTPT CLINIC VISIT: HCPCS | Performed by: INTERNAL MEDICINE

## 2023-06-15 PROCEDURE — 85027 COMPLETE CBC AUTOMATED: CPT

## 2023-06-15 PROCEDURE — 84153 ASSAY OF PSA TOTAL: CPT

## 2023-06-15 PROCEDURE — 96402 CHEMO HORMON ANTINEOPL SQ/IM: CPT

## 2023-06-15 PROCEDURE — 36415 COLL VENOUS BLD VENIPUNCTURE: CPT

## 2023-06-15 RX ADMIN — LEUPROLIDE ACETATE 45 MG: KIT SUBCUTANEOUS at 11:13

## 2023-06-15 ASSESSMENT — PAIN SCALES - GENERAL: PAINLEVEL: NO PAIN (0)

## 2023-06-15 NOTE — PROGRESS NOTES
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    Weight as of this encounter: 93.2 kg (205 lb 8 oz). Body surface area is 2.18 meters squared.  No Pain (0) Comment: Data Unavailable   No LMP for male patient.  Allergies reviewed: Yes  Medications reviewed: Yes    Medications: Medication refills not needed today.  Pharmacy name entered into EPIC:    [id number] - [place], [place] - [address]  [facility] # [id number] - [place], [place] - [address] [facility], [address] [id number] - [place], [place] - [address]    Clinical concerns: None       Liana Rick LPN

## 2023-06-15 NOTE — LETTER
6/15/2023         RE: Justin Pires  [address] [place] [address]        Dear Colleague,    Thank you for referring your patient, Justin Pires, to the [facility]. Please see a copy of my visit note below.    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    Weight as of this encounter: 93.2 kg (205 lb 8 oz). Body surface area is 2.18 meters squared.  No Pain (0) Comment: Data Unavailable   No LMP for male patient.  Allergies reviewed: Yes  Medications reviewed: Yes    Medications: Medication refills not needed today.  Pharmacy name entered into [employer]:    [facility] # [id number] - [place], [place] - [address]  [id number] - [place], [place] - [address] [place] [address], [address] [facility][id number] - [place], [place] - [address] [place] [address] AT SEC OF [address] [facility] [address]    Clinical concerns: None       Liana Rick LPN              [facility] Hematology and Oncology Progress Note    Patient: Justin Pires  MRN: [id number]  Date of Service: Roderick 15, 2023       Reason for visit         Problem List Items Addressed This Visit        Immune    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  Metastatic prostate cancer with involvement of the axillary lymph node, bones and intra-abdominal lymph nodes. He responded very well to treatment with Taxotere  chemotherapy followed by hormonal therapy.  Was off hormonal therapy from I believe April 2018 until May 2021.  After starting hormonal therapy PSA is undetectable.  Choline PET scan in April 2022 still showed low-level activity in the iliac lymph nodes.  Question of small uptake in the thoracic vertebral body which was felt to be not clearly metastatic on the MRI which was done subsequently.  Currently treatment with with Anti androgen therapy with Lupron alone. Most recent C-14 PET scan shows no active disease. PSA is undetectable.  2.  Was given recommendation to consider salvage radiation therapy to treat pelvic lymph nodes.  3.  Hormone deprivation side effects.  Justin is due for his Eligard.  4.  Musculoskeletal stiffness in his hands.       Plan      1.  Continue with Eligard every 6 monthly shot.  2.  Justin will come back in November for his next shot.  3.  Continue follow-up on the PSA.  4.  Continue with alendronate to maintain bone density.  5.  Continue good diet and exercise.  6.  Use cialis prn.    Medical decision making      Moderately complex.      Risk      Significant risk of morbidity mortality.  Significant risk of side effects from intervention.       Cancer Staging   No matching staging information was found for the patient.        History of present illness        Mr. Justin Pires male with a history of elevated PSA. He is actually prior history of prostate cancer diagnosed in 1992 when he presented to [facility] for a executive physical and was found to have elevated PSA. He was completely is symptomatically that point. His workup then revealed that he indeed had prostate cancer. At that point at 47 years of age he underwent retropubic prostatectomy which revealed prostate cancer involving the left lobe of the prostate grade 2 out of 3 with negative margins and negative lymph nodes. He had been in remission ever since. In January 2016 he had a PSA checked on which actually came back at 10.7.  It was thought at the time that he might have prostatitis. He was given some antibiotics. He then went down to [place]. There he had his PSA checked and was down to 6. He came back in April 2016 and had a repeat PSA checked which was elevated again at 15.7. Therefore Dr. Manjarrez asked him to come and see me.     He is also been seen by his urologist Dr. Fortunato Collazo. He had MRI CT scan and bone scan done. None of them revealed any lesion.     He had a ProstaScint scan which actually showed some uptake on the right prostatic fossa.  I sent him to Dr. Browne's for another ultrasound-guided biopsy of his right prostate bed.  The biopsy was negative for any detectable prostate cancer.     He then was seen at the [facility] and got a C11 PET-choline image that showed metastatic disease with bone mets and lymph node mets. He had an axillary lymph node biopsied that was consistent with prostate cancer. He has been started on Lupron in June 2016 and in July 26th 2016 started on Taxotere. Had his first cycle end of July 2016. Tolerated it with expected side effects. His Psa basically became undetectable very quickly. He has finished 6 cycles. Last one in November 2016.     Had C11 PET scan at [facility] that showed near complete response.  Eventually stopped hormonal therapy sometime in spring 2018.  He had been followed by PSAs and C 11 choline PET scan.  His PSA started to rise in January 2021 it was 0.19.  His CA 11 choline PET scan was negative.     In April 2021 his PSA was 0.71.  His choline PET scan revealed several low-level tiny pelvic lymph nodes in the external and common iliac lymph node chains.  He started his hormonal therapy.  His testosterone level checked in 4/16/2021 showed normal testosterone level.     PSA checked on 10/5/2021 showed less than 0.1. His PET scan done on 10/4/2021 at [facility] still showed some persistent uptake in the iliac lymph nodes. He was recommended to consider radiation therapy to  those lymph nodes. He is here to discuss that option.    However in October 2021 I recommended against radiation therapy.  Justin is happy with the recommendation.  Continued on Lupron therapy.  The PSA in the meantime has stayed undetectable.    Justin had another choline PET scan done at [facility] in April 2022.  It showed persistent uptake in the iliac lymph nodes.  However not a very high level of uptake.  There was a question of an uptake in the thoracic vertebral body.  The thoracic vertebral body was further evaluated with the help of an MRI which suggested that this perhaps could be inflammatory from a humeral node rather than a metastatic lesion.    Justin had consult with Dr. Richard Rey radiation oncologist at [facility].  Justin was given option of pelvic radiation up to 64 cGy including the prostate bed versus combination of Lupron with abiraterone.  I recommended against radiation and proceeding with Anti androgen therapy with Lupron alone.  uJstin has been doing exceedingly well.  PSA continues to be undetectable.  Choline PET scan done in August 2022 showed improvement in all the areas of the disease that was visible before.  No new areas.    Justin comes in today for follow-up.  He did have a CT-14 PET scan done at [facility].  That showed no evidence of any metastatic disease or any active disease for that matter.  PSA continues to be undetectable.  Overall Justin is tolerating the treatment quite well.  He did put off taking his Lupron/Eligard shot by few days.  Besides the lack of androgenic effects he is tolerating the treatment quite well.  Some hot flashes here and there.    Review of system      A 14 point review of systems was obtained.  Positive findings noted in the history.  Rest of the review of system is otherwise negative.     Past medical history      Past Medical History:   Diagnosis Date     Gout      Hyperlipidemia      Hypothyroidism      Prostate cancer (H)         Past Surgical History:   Procedure Laterality Date     APPENDECTOMY       CATARACT EXTRACTION       COLONOSCOPY  12/11/2017     HERNIA REPAIR       PROSTATE SURGERY  1992     [facility] APPENDECTOMY      Description: Appendectomy;  Recorded: 03/17/2008;     [facility] REMV PROSTATE,RETROPUB,RAD,LTD NODES      Description: Prostatectomy Retropubic Radical With Lymph Node Biopsy(S);  Recorded: 03/25/2007;       Physical exam        /88   Pulse 66   Temp 97.8  F (36.6  C)   Resp 18   Ht 1.829 m (6')   Wt 93.2 kg (205 lb 8 oz)   SpO2 93%   BMI 27.87 kg/m    GENERAL: No acute distress. Cooperative in conversation.   HEENT:  Pupils are equal, round and reactive. Oral mucosa is clean and intact. No ulcerations or mucositis noted. No bleeding noted.  RESP:Chest symmetric lungs are clear bilaterally per auscultation. Regular respiratory rate. No wheezes or rhonchi.  CV: Normal S1 S2 Regular, rate and rhythm. No murmurs.    ABD: Nondistended, soft, nontender. Positive bowel sounds. No organomegaly.   EXTREMITIES: No lower extremity edema.   NEURO: Non- focal. Alert and oriented x3.  Cranial nerves appear intact.  PSYCH: Within normal limits. No depression or anxiety.  SKIN: Warm dry intact.     Lab results      Recent Results (from the past 168 hour(s))   CBC with platelets   Result Value Ref Range    WBC Count 5.7 4.0 - 11.0 10e3/uL    RBC Count 4.45 4.40 - 5.90 10e6/uL    Hemoglobin 15.2 13.3 - 17.7 g/dL    Hematocrit 42.4 40.0 - 53.0 %    MCV 95 78 - 100 fL    MCH 34.2 (H) 26.5 - 33.0 pg    MCHC 35.8 31.5 - 36.5 g/dL    RDW 11.7 10.0 - 15.0 %    Platelet Count 230 150 - 450 10e3/uL   Comprehensive metabolic panel   Result Value Ref Range    Sodium 138 136 - 145 mmol/L    Potassium 4.4 3.4 - 5.3 mmol/L    Chloride 102 98 - 107 mmol/L    Carbon Dioxide (CO2) 24 22 - 29 mmol/L    Anion Gap 12 7 - 15 mmol/L    Urea Nitrogen 15.2 8.0 - 23.0 mg/dL    Creatinine 0.89 0.67 - 1.17 mg/dL    Calcium 9.5 8.8 - 10.2 mg/dL     Glucose 100 (H) 70 - 99 mg/dL    Alkaline Phosphatase 53 40 - 129 U/L    AST 36 0 - 45 U/L    ALT 30 0 - 70 U/L    Protein Total 6.8 6.4 - 8.3 g/dL    Albumin 4.4 3.5 - 5.2 g/dL    Bilirubin Total 0.8 <=1.2 mg/dL    GFR Estimate 88 >60 mL/min/1.73m2     Reviewed his PSA from [facility] which was less than 0.1.    Imaging results      Anatomical Region Laterality Modality   Body -- Positron Emission Tomography (PET)   Nuclear Medicine PET RST LOS -- Positron Emission Tomography (PET)   PET ARZ LOS -- --   Nuclear Medicine PET FLA LOS -- --   Nuclear Medicine -- --     Impression    EXAM:  PET CT CHOLINE     RADIOPHARMACEUTICAL/MEDS:   Route: intravenous   choline C 11 injection (CHOLINE C-11),13.34 millicurie     TECHNIQUE:  C-11 Choline PET/CT scan was performed from the vertex through the thighs with low dose,   non-contrast, free-breathing CT images for attenuation correction and anatomic localization (AC/AL),   with imaging beginning at approximately 5 minutes after radiotracer injection.     COMPARISON:  Choline PET/CT 8/19/2022, 4/18/2022     INDICATION:  Restaging advanced prostate cancer. Subsequent treatment strategy.     The patient reports no recent vaccinations.     FINDINGS:   PROSTATE BED: Mild choline uptake near the left aspect of the vesicourethral anastomosis (image   317), more conspicuous compared to prior.     LYMPH NODES: Stable mild choline uptake in a small left common iliac lymph node (image 275).  No new   suspicious choline avid lymph nodes. Similar reactive pattern of choline uptake in inguinal lymph   nodes.     OSSEOUS DISEASE: No suspicious choline avid osseous lesions. Decreased choline uptake in the T9 and   T10 vertebral bodies compared to the background marrow uptake.     OTHER METASTATIC DISEASE: None. Unchanged inflammatory pattern, and uptake near the left inguinal   canal. Degenerative/reactive pattern of choline uptake in the right shoulder.     Additional findings on the  noncontrast low-dose CT: Prostatectomy and pelvic node dissection.   Vascular calcifications including the coronary arteries. Hepatic cyst. Degenerative changes in the   spine. Diffuse hepatic steatosis. Cholelithiasis.  Procedure Note    Jaspal Lopez M.D. - 05/05/2023   Formatting of this note might be different from the original.   EXAM:  PET CT CHOLINE     Procedure Note      No results found.      Signed by: Turner Sahni MD,       This note has been dictated using voice recognition software. Any grammatical or context distortions are unintentional and inherent to the software        Again, thank you for allowing me to participate in the care of your patient.        Sincerely,        Turner Sahni MD, MD

## 2023-06-15 NOTE — PROGRESS NOTES
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  Metastatic prostate cancer with involvement of the axillary lymph node, bones and intra-abdominal lymph nodes. He responded very well to treatment with Taxotere chemotherapy followed by hormonal therapy.  Was off hormonal therapy from I believe April 2018 until May 2021.  After starting hormonal therapy PSA is undetectable.  Choline PET scan in April 2022 still showed low-level activity in the iliac lymph nodes.  Question of small uptake in the thoracic vertebral body which was felt to be not clearly metastatic on the MRI which was done subsequently.  Currently treatment with with Anti androgen therapy with Lupron alone. Most recent C-14 PET scan shows no active disease. PSA is undetectable.  2.  Was given recommendation to consider salvage radiation therapy to treat pelvic lymph nodes.  3.  Hormone deprivation side effects.  Justin is due for his Eligard.  4.  Musculoskeletal stiffness in his hands.       Plan      1.  Continue with Eligard every 6 monthly shot.  2.  Justin will come back in November for his next shot.  3.  Continue follow-up on the PSA.  4.  Continue with alendronate to maintain bone density.  5.  Continue good diet and exercise.  6.  Use cialis prn.    Medical decision making      Moderately complex.      Risk      Significant risk of morbidity mortality.  Significant risk of side effects from intervention.       Cancer Staging   No matching staging information was found for the patient.        History of present illness        . Justin Pires male with a history of elevated PSA. He is  actually prior history of prostate cancer diagnosed in 1992 when he presented to [facility] for a executive physical and was found to have elevated PSA. He was completely is symptomatically that point. His workup then revealed that he indeed had prostate cancer. At that point at 47 years of age he underwent retropubic prostatectomy which revealed prostate cancer involving the left lobe of the prostate grade 2 out of 3 with negative margins and negative lymph nodes. He had been in remission ever since. In January 2016 he had a PSA checked on which actually came back at 10.7. It was thought at the time that he might have prostatitis. He was given some antibiotics. He then went down to [place]. There he had his PSA checked and was down to 6. He came back in April 2016 and had a repeat PSA checked which was elevated again at 15.7. Therefore Dr. Manjarrez asked him to come and see me.     He is also been seen by his urologist Dr. Fortunato Collazo. He had MRI CT scan and bone scan done. None of them revealed any lesion.     He had a ProstaScint scan which actually showed some uptake on the right prostatic fossa.  I sent him to Dr. Browne's for another ultrasound-guided biopsy of his right prostate bed.  The biopsy was negative for any detectable prostate cancer.     He then was seen at the [facility] and got a C11 PET-choline image that showed metastatic disease with bone mets and lymph node mets. He had an axillary lymph node biopsied that was consistent with prostate cancer. He has been started on Lupron in June 2016 and in July 26th 2016 started on Taxotere. Had his first cycle end of July 2016. Tolerated it with expected side effects. His Psa basically became undetectable very quickly. He has finished 6 cycles. Last one in November 2016.     Had C11 PET scan at [place] that showed near complete response.  Eventually stopped hormonal therapy sometime in spring 2018.  He had been followed by PSAs and C 11 choline PET scan.   His PSA started to rise in January 2021 it was 0.19.  His CA 11 choline PET scan was negative.     In April 2021 his PSA was 0.71.  His choline PET scan revealed several low-level tiny pelvic lymph nodes in the external and common iliac lymph node chains.  He started his hormonal therapy.  His testosterone level checked in 4/16/2021 showed normal testosterone level.     PSA checked on 10/5/2021 showed less than 0.1. His PET scan done on 10/4/2021 at [facility] still showed some persistent uptake in the iliac lymph nodes. He was recommended to consider radiation therapy to those lymph nodes. He is here to discuss that option.    However in October 2021 I recommended against radiation therapy.  Justin is happy with the recommendation.  Continued on Lupron therapy.  The PSA in the meantime has stayed undetectable.    Justin had another choline PET scan done at [facility] in April 2022.  It showed persistent uptake in the iliac lymph nodes.  However not a very high level of uptake.  There was a question of an uptake in the thoracic vertebral body.  The thoracic vertebral body was further evaluated with the help of an MRI which suggested that this perhaps could be inflammatory from a humeral node rather than a metastatic lesion.    Justin had consult with Dr. Richard Rey radiation oncologist at [facility].  Justin was given option of pelvic radiation up to 64 cGy including the prostate bed versus combination of Lupron with abiraterone.  I recommended against radiation and proceeding with Anti androgen therapy with Lupron alone.  Justin has been doing exceedingly well.  PSA continues to be undetectable.  Choline PET scan done in August 2022 showed improvement in all the areas of the disease that was visible before.  No new areas.    Justin comes in today for follow-up.  He did have a CT-14 PET scan done at [facility].  That showed no evidence of any metastatic disease or any active disease for that  matter.  PSA continues to be undetectable.  Overall Justin is tolerating the treatment quite well.  He did put off taking his Lupron/Eligard shot by few days.  Besides the lack of androgenic effects he is tolerating the treatment quite well.  Some hot flashes here and there.    Review of system      A 14 point review of systems was obtained.  Positive findings noted in the history.  Rest of the review of system is otherwise negative.     Past medical history      Past Medical History:   Diagnosis Date     Gout      Hyperlipidemia      Hypothyroidism      Prostate cancer (H)        Past Surgical History:   Procedure Laterality Date     APPENDECTOMY       CATARACT EXTRACTION       COLONOSCOPY  12/11/2017     HERNIA REPAIR       PROSTATE SURGERY  1992     [facility] APPENDECTOMY      Description: Appendectomy;  Recorded: 03/17/2008;     [facility] REMV PROSTATE,RETROPUB,RAD,LTD NODES      Description: Prostatectomy Retropubic Radical With Lymph Node Biopsy(S);  Recorded: 03/25/2007;       Physical exam        /88   Pulse 66   Temp 97.8  F (36.6  C)   Resp 18   Ht 1.829 m (6')   Wt 93.2 kg (205 lb 8 oz)   SpO2 93%   BMI 27.87 kg/m    GENERAL: No acute distress. Cooperative in conversation.   HEENT:  Pupils are equal, round and reactive. Oral mucosa is clean and intact. No ulcerations or mucositis noted. No bleeding noted.  RESP:Chest symmetric lungs are clear bilaterally per auscultation. Regular respiratory rate. No wheezes or rhonchi.  CV: Normal S1 S2 Regular, rate and rhythm. No murmurs.    ABD: Nondistended, soft, nontender. Positive bowel sounds. No organomegaly.   EXTREMITIES: No lower extremity edema.   NEURO: Non- focal. Alert and oriented x3.  Cranial nerves appear intact.  PSYCH: Within normal limits. No depression or anxiety.  SKIN: Warm dry intact.     Lab results      Recent Results (from the past 168 hour(s))   CBC with platelets   Result Value Ref Range    WBC Count 5.7 4.0 - 11.0 10e3/uL    RBC Count 4.45  4.40 - 5.90 10e6/uL    Hemoglobin 15.2 13.3 - 17.7 g/dL    Hematocrit 42.4 40.0 - 53.0 %    MCV 95 78 - 100 fL    MCH 34.2 (H) 26.5 - 33.0 pg    MCHC 35.8 31.5 - 36.5 g/dL    RDW 11.7 10.0 - 15.0 %    Platelet Count 230 150 - 450 10e3/uL   Comprehensive metabolic panel   Result Value Ref Range    Sodium 138 136 - 145 mmol/L    Potassium 4.4 3.4 - 5.3 mmol/L    Chloride 102 98 - 107 mmol/L    Carbon Dioxide (CO2) 24 22 - 29 mmol/L    Anion Gap 12 7 - 15 mmol/L    Urea Nitrogen 15.2 8.0 - 23.0 mg/dL    Creatinine 0.89 0.67 - 1.17 mg/dL    Calcium 9.5 8.8 - 10.2 mg/dL    Glucose 100 (H) 70 - 99 mg/dL    Alkaline Phosphatase 53 40 - 129 U/L    AST 36 0 - 45 U/L    ALT 30 0 - 70 U/L    Protein Total 6.8 6.4 - 8.3 g/dL    Albumin 4.4 3.5 - 5.2 g/dL    Bilirubin Total 0.8 <=1.2 mg/dL    GFR Estimate 88 >60 mL/min/1.73m2     Reviewed his PSA from [facility] which was less than 0.1.    Imaging results      Anatomical Region Laterality Modality   Body -- Positron Emission Tomography (PET)   Nuclear Medicine PET T LOS -- Positron Emission Tomography (PET)   PET [facility] -- --   Nuclear Medicine PET [facility] -- --   Nuclear Medicine -- --     Impression    EXAM:  PET CT CHOLINE     RADIOPHARMACEUTICAL/MEDS:   Route: intravenous   choline C 11 injection (CHOLINE C-11),13.34 millicurie     TECHNIQUE:  C-11 Choline PET/CT scan was performed from the vertex through the thighs with low dose,   non-contrast, free-breathing CT images for attenuation correction and anatomic localization (AC/AL),   with imaging beginning at approximately 5 minutes after radiotracer injection.     COMPARISON:  Choline PET/CT 8/19/2022, 4/18/2022     INDICATION:  Restaging advanced prostate cancer. Subsequent treatment strategy.     The patient reports no recent vaccinations.     FINDINGS:   PROSTATE BED: Mild choline uptake near the left aspect of the vesicourethral anastomosis (image   317), more conspicuous compared to prior.     LYMPH NODES: Stable  mild choline uptake in a small left common iliac lymph node (image 275).  No new   suspicious choline avid lymph nodes. Similar reactive pattern of choline uptake in inguinal lymph   nodes.     OSSEOUS DISEASE: No suspicious choline avid osseous lesions. Decreased choline uptake in the T9 and   T10 vertebral bodies compared to the background marrow uptake.     OTHER METASTATIC DISEASE: None. Unchanged inflammatory pattern, and uptake near the left inguinal   canal. Degenerative/reactive pattern of choline uptake in the right shoulder.     Additional findings on the noncontrast low-dose CT: Prostatectomy and pelvic node dissection.   Vascular calcifications including the coronary arteries. Hepatic cyst. Degenerative changes in the   spine. Diffuse hepatic steatosis. Cholelithiasis.  Procedure Note    Jaspal Lopez M.D. - 05/05/2023   Formatting of this note might be different from the original.   EXAM:  PET CT CHOLINE     Procedure Note      No results found.      Signed by: Turner Sahni MD,       This note has been dictated using voice recognition software. Any grammatical or context distortions are unintentional and inherent to the software

## 2023-06-15 NOTE — PROGRESS NOTES
Infusion Nursing Note:  Justin Pires presents today for Eligard injection.    Patient seen by provider today: Yes: Dr. Sahni   present during visit today: Not Applicable.    Note: Administered Eligard subcutaneous (left arm) as ordered per provider. No issues.       Intravenous Access:  Labs drawn without difficulty.    Treatment Conditions:  Not Applicable.      Post Infusion Assessment:  Patient tolerated injection without incident.       Discharge Plan:   Patient discharged in stable condition accompanied by: self.  Departure Mode: Ambulatory.      FRANCINE CRUZ RN

## 2023-10-03 DIAGNOSIS — G62.9 NEUROPATHY: ICD-10-CM

## 2023-10-03 DIAGNOSIS — F32.5 MAJOR DEPRESSIVE DISORDER WITH SINGLE EPISODE, IN REMISSION (H): ICD-10-CM

## 2023-10-03 RX ORDER — ESCITALOPRAM OXALATE 10 MG/1
TABLET ORAL
Qty: 90 TABLET | Refills: 3 | Status: SHIPPED | OUTPATIENT
Start: 2023-10-03 | End: 2024-04-15

## 2023-10-24 ENCOUNTER — TRANSFERRED RECORDS (OUTPATIENT)
Dept: HEALTH INFORMATION MANAGEMENT | Facility: CLINIC | Age: 78
End: 2023-10-24
Payer: COMMERCIAL

## 2023-11-28 DIAGNOSIS — R60.0 PERIPHERAL EDEMA: ICD-10-CM

## 2023-11-28 DIAGNOSIS — E78.00 PURE HYPERCHOLESTEROLEMIA: Primary | ICD-10-CM

## 2023-11-28 RX ORDER — TRIAMTERENE/HYDROCHLOROTHIAZID 37.5-25 MG
1 TABLET ORAL DAILY
Qty: 90 TABLET | Refills: 3 | Status: SHIPPED | OUTPATIENT
Start: 2023-11-28 | End: 2024-03-21

## 2023-11-28 RX ORDER — FLUVASTATIN 20 MG/1
20 CAPSULE ORAL AT BEDTIME
Qty: 90 CAPSULE | Refills: 3 | Status: SHIPPED | OUTPATIENT
Start: 2023-11-28 | End: 2024-04-15

## 2023-12-04 ENCOUNTER — ONCOLOGY VISIT (OUTPATIENT)
Dept: ONCOLOGY | Facility: HOSPITAL | Age: 78
End: 2023-12-04
Attending: INTERNAL MEDICINE
Payer: COMMERCIAL

## 2023-12-04 ENCOUNTER — TELEPHONE (OUTPATIENT)
Dept: ONCOLOGY | Facility: HOSPITAL | Age: 78
End: 2023-12-04
Payer: COMMERCIAL

## 2023-12-04 ENCOUNTER — PATIENT OUTREACH (OUTPATIENT)
Dept: CARE COORDINATION | Facility: CLINIC | Age: 78
End: 2023-12-04

## 2023-12-04 ENCOUNTER — LAB (OUTPATIENT)
Dept: INFUSION THERAPY | Facility: HOSPITAL | Age: 78
End: 2023-12-04
Attending: INTERNAL MEDICINE
Payer: COMMERCIAL

## 2023-12-04 VITALS
HEART RATE: 95 BPM | RESPIRATION RATE: 16 BRPM | DIASTOLIC BLOOD PRESSURE: 79 MMHG | SYSTOLIC BLOOD PRESSURE: 135 MMHG | BODY MASS INDEX: 27.13 KG/M2 | HEIGHT: 72 IN | OXYGEN SATURATION: 95 % | TEMPERATURE: 97.6 F | WEIGHT: 200.3 LBS

## 2023-12-04 DIAGNOSIS — C61 PROSTATE CANCER METASTATIC TO INTRAPELVIC LYMPH NODE (H): Primary | ICD-10-CM

## 2023-12-04 DIAGNOSIS — C77.5 PROSTATE CANCER METASTATIC TO INTRAPELVIC LYMPH NODE (H): ICD-10-CM

## 2023-12-04 DIAGNOSIS — C41.2 MALIGNANT NEOPLASM OF VERTEBRAL COLUMN, EXCLUDING SACRUM AND COCCYX (H): ICD-10-CM

## 2023-12-04 DIAGNOSIS — C77.5 PROSTATE CANCER METASTATIC TO INTRAPELVIC LYMPH NODE (H): Primary | ICD-10-CM

## 2023-12-04 DIAGNOSIS — C61 PROSTATE CANCER METASTATIC TO INTRAPELVIC LYMPH NODE (H): ICD-10-CM

## 2023-12-04 LAB
ALBUMIN SERPL BCG-MCNC: 4.2 G/DL (ref 3.5–5.2)
ALP SERPL-CCNC: 70 U/L (ref 40–150)
ALT SERPL W P-5'-P-CCNC: 20 U/L (ref 0–70)
ANION GAP SERPL CALCULATED.3IONS-SCNC: 11 MMOL/L (ref 7–15)
AST SERPL W P-5'-P-CCNC: 26 U/L (ref 0–45)
BILIRUB SERPL-MCNC: 0.6 MG/DL
BUN SERPL-MCNC: 16.8 MG/DL (ref 8–23)
CALCIUM SERPL-MCNC: 9.7 MG/DL (ref 8.8–10.2)
CHLORIDE SERPL-SCNC: 102 MMOL/L (ref 98–107)
CREAT SERPL-MCNC: 0.93 MG/DL (ref 0.67–1.17)
DEPRECATED HCO3 PLAS-SCNC: 27 MMOL/L (ref 22–29)
EGFRCR SERPLBLD CKD-EPI 2021: 84 ML/MIN/1.73M2
GLUCOSE SERPL-MCNC: 103 MG/DL (ref 70–99)
POTASSIUM SERPL-SCNC: 4.4 MMOL/L (ref 3.4–5.3)
PROT SERPL-MCNC: 7 G/DL (ref 6.4–8.3)
PSA SERPL DL<=0.01 NG/ML-MCNC: <0.01 NG/ML (ref 0–6.5)
SODIUM SERPL-SCNC: 140 MMOL/L (ref 135–145)

## 2023-12-04 PROCEDURE — G0463 HOSPITAL OUTPT CLINIC VISIT: HCPCS | Performed by: INTERNAL MEDICINE

## 2023-12-04 PROCEDURE — 36415 COLL VENOUS BLD VENIPUNCTURE: CPT

## 2023-12-04 PROCEDURE — 99214 OFFICE O/P EST MOD 30 MIN: CPT | Performed by: INTERNAL MEDICINE

## 2023-12-04 PROCEDURE — 80053 COMPREHEN METABOLIC PANEL: CPT

## 2023-12-04 PROCEDURE — 84153 ASSAY OF PSA TOTAL: CPT

## 2023-12-04 ASSESSMENT — PAIN SCALES - GENERAL: PAINLEVEL: NO PAIN (0)

## 2023-12-04 NOTE — PROGRESS NOTES
Oncology Rooming Note    December 4, 2023 11:06 AM   Justin Pires is a 77 year old male who presents for:    Chief Complaint   Patient presents with    Oncology Clinic Visit     Return visit 6 months with lab and injection. Prostate cancer metastatic to intrapelvic lymph node.     Initial Vitals: /79 (BP Location: Right arm, Patient Position: Sitting, Cuff Size: Adult Regular)   Pulse 95   Temp 97.6  F (36.4  C) (Oral)   Resp 16   Ht 1.829 m (6')   Wt 90.9 kg (200 lb 4.8 oz)   SpO2 95%   BMI 27.17 kg/m   Estimated body mass index is 27.17 kg/m  as calculated from the following:    Height as of this encounter: 1.829 m (6').    Weight as of this encounter: 90.9 kg (200 lb 4.8 oz). Body surface area is 2.15 meters squared.  No Pain (0) Comment: Data Unavailable   No LMP for male patient.  Allergies reviewed: Yes  Medications reviewed: Yes    Medications: Medication refills not needed today.  Pharmacy name entered into [employer]:    [facility] # [id number] - [place], [place] - [address]  [id number] - [place], [place] - [address] [facility], [address] [place] [address] DRUG STORE #[id number] - [place], [place] - [facility] [address] AT SEC OF [facility] [address]    Clinical concerns: Return visit 6 months with lab and injection. Prostate cancer metastatic to intrapelvic lymph node.      Radha Loaiza CMA ([facility])

## 2023-12-04 NOTE — LETTER
12/4/2023         RE: Justin Pires  [address] [place] [address]        Dear Colleague,    Thank you for referring your patient, Justin Pires, to the [facility] [place]. Please see a copy of my visit note below.    Oncology Rooming Note    December 4, 2023 11:06 AM   Justin Pires is a 77 year old male who presents for:    Chief Complaint   Patient presents with     Oncology Clinic Visit     Return visit 6 months with lab and injection. Prostate cancer metastatic to intrapelvic lymph node.     Initial Vitals: /79 (BP Location: Right arm, Patient Position: Sitting, Cuff Size: Adult Regular)   Pulse 95   Temp 97.6  F (36.4  C) (Oral)   Resp 16   Ht 1.829 m (6')   Wt 90.9 kg (200 lb 4.8 oz)   SpO2 95%   BMI 27.17 kg/m   Estimated body mass index is 27.17 kg/m  as calculated from the following:    Height as of this encounter: 1.829 m (6').    Weight as of this encounter: 90.9 kg (200 lb 4.8 oz). Body surface area is 2.15 meters squared.  No Pain (0) Comment: Data Unavailable   No LMP for male patient.  Allergies reviewed: Yes  Medications reviewed: Yes    Medications: Medication refills not needed today.  Pharmacy name entered into [facility]:    [id number] - [place], [place] - [address] [facility]  [id number] - [place], [place] - [address] [facility] [place], [address] [place] [address] DRUG STORE #[id number] - [place], [place] - [address] [place] [address] AT SEC OF [place] [address] [facility] [address]    Clinical concerns: Return visit 6 months with lab and injection. Prostate cancer metastatic to intrapelvic lymph node.      Radha Loaiza CMA (AAMA)                [facility] Hematology and Oncology Progress Note    Patient: Justin Pires  MRN: [id number]  Date of Service: Dec 4, 2023       Reason for visit         Problem List Items Addressed This Visit          Immune    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) - Primary         Assessment      1.  Metastatic prostate cancer with  involvement of the axillary lymph node, bones and intra-abdominal lymph nodes. He responded very well to treatment with Taxotere chemotherapy followed by hormonal therapy. Was off hormonal therapy from I believe April 2018 until May 2021.  After starting hormonal therapy PSA is undetectable.  Choline PET scan in April 2022 still showed low-level activity in the iliac lymph nodes.  Question of small uptake in the thoracic vertebral body which was felt to be not clearly metastatic on the MRI which was done subsequently.  Currently treatment with with Anti-androgen therapy with Lupron alone. Most recent C-14 PET scan shows no active disease except from a small area of urethrovesical junction which appears to have some activity. PSA is undetectable when tested in May 2023.  2.  Was given recommendation to consider salvage radiation therapy to treat pelvic lymph nodes.  However he has not done that.  He is doing quite well in spite of that.  3.  Hormone deprivation side effects.  Justin is due for his Eligard.  4.  Musculoskeletal stiffness in his hands.       Plan      Discussed with the patient at this time we will continue with Eligard.  The patient wants to delay his next shot by about couple of months.  He wants to come back in March to get it done.  He is going for a ski break and wants to have some muscular strength before he goes for his ski vacation.  He will otherwise continue Eligard every 6 months.  Follow-up on the PSA  He should continue on alendronate to maintain his bone density.  Use of Cialis as needed.    Medical decision making      Moderately complex.      Risk      Significant risk of morbidity mortality.  Significant risk of side effects from intervention.       Cancer Staging   No matching staging information was found for the patient.        History of present illness        Mr. Justin Pires male with a history of elevated PSA. He is actually prior history of prostate cancer diagnosed in 1992 when  he presented to [facility] for a executive physical and was found to have elevated PSA. He was completely is symptomatically that point. His workup then revealed that he indeed had prostate cancer. At that point at 47 years of age he underwent retropubic prostatectomy which revealed prostate cancer involving the left lobe of the prostate grade 2 out of 3 with negative margins and negative lymph nodes. He had been in remission ever since. In January 2016 he had a PSA checked on which actually came back at 10.7. It was thought at the time that he might have prostatitis. He was given some antibiotics. He then went down to [place]. There he had his PSA checked and was down to 6. He came back in April 2016 and had a repeat PSA checked which was elevated again at 15.7. Therefore Dr. Manjarrez asked him to come and see me.     He is also been seen by his urologist Dr. Fortunato Collazo. He had MRI CT scan and bone scan done. None of them revealed any lesion.     He had a ProstaScint scan which actually showed some uptake on the right prostatic fossa.  I sent him to Dr. Browne's for another ultrasound-guided biopsy of his right prostate bed.  The biopsy was negative for any detectable prostate cancer.     He then was seen at the [facility] and got a C11 PET-choline image that showed metastatic disease with bone mets and lymph node mets. He had an axillary lymph node biopsied that was consistent with prostate cancer. He has been started on Lupron in June 2016 and in July 26th 2016 started on Taxotere. Had his first cycle end of July 2016. Tolerated it with expected side effects. His Psa basically became undetectable very quickly. He has finished 6 cycles. Last one in November 2016.     Had C11 PET scan at [place] that showed near complete response.  Eventually stopped hormonal therapy sometime in spring 2018.  He had been followed by PSAs and C 11 choline PET scan.  His PSA started to rise in January 2021 it was 0.19.  His CA 11  choline PET scan was negative.     In April 2021 his PSA was 0.71.  His choline PET scan revealed several low-level tiny pelvic lymph nodes in the external and common iliac lymph node chains.  He started his hormonal therapy.  His testosterone level checked in 4/16/2021 showed normal testosterone level.     PSA checked on 10/5/2021 showed less than 0.1. His PET scan done on 10/4/2021 at [facility] still showed some persistent uptake in the iliac lymph nodes. He was recommended to consider radiation therapy to those lymph nodes. He is here to discuss that option.    However in October 2021 I recommended against radiation therapy.  Justin is happy with the recommendation.  Continued on Lupron therapy.  The PSA in the meantime has stayed undetectable.    Justin had another choline PET scan done at [facility] in April 2022.  It showed persistent uptake in the iliac lymph nodes.  However not a very high level of uptake.  There was a question of an uptake in the thoracic vertebral body.  The thoracic vertebral body was further evaluated with the help of an MRI which suggested that this perhaps could be inflammatory from a humeral node rather than a metastatic lesion.    Justin had consult with Dr. Richard Rey radiation oncologist at [facility].  Justin was given option of pelvic radiation up to 64 cGy including the prostate bed versus combination of Lupron with abiraterone.  I recommended against radiation and proceeding with Anti androgen therapy with Lupron alone.  Justin has been doing exceedingly well.  PSA continues to be undetectable.  Choline PET scan done in August 2022 showed improvement in all the areas of the disease that was visible before.  No new areas.    He has continued on Eligard every 6 months.  He had a PET scan (choline PET) done at [facility] on May 2023.  On that PET scan there was a new faint choline uptake near the left aspect of the vesicourethral anastomosis was noted.  Stable  mild choline uptake in the small left common iliac lymph node was noted.  No suspicious bone metastases were noted.    His PSA was again undetectable at that time.    Comes in today for scheduled follow-up.  Overall feeling well.  He is planning to go for a ski break so he wants to delay his Lupron shot.      Review of system      Details noted in the history of present illness.  A detailed review of systems is otherwise negative.      Physical exam        /79 (BP Location: Right arm, Patient Position: Sitting, Cuff Size: Adult Regular)   Pulse 95   Temp 97.6  F (36.4  C) (Oral)   Resp 16   Ht 1.829 m (6')   Wt 90.9 kg (200 lb 4.8 oz)   SpO2 95%   BMI 27.17 kg/m      GENERAL: no acute distress. Cooperative in conversation.   HEENT: pupils are equal, round and reactive. Oral mucosa is moist and intact.  RESP:Chest symmetric. Regular respiratory rate. No stridor.  ABD: Nondistended, soft.  EXTREMITIES: No lower extremity edema.   NEURO: non focal. Alert and oriented x3.   PSYCH: within normal limits. No depression or anxiety.  SKIN: warm dry intact       Lab results Reviewed      Recent Results (from the past 168 hour(s))   Comprehensive metabolic panel   Result Value Ref Range    Sodium 140 135 - 145 mmol/L    Potassium 4.4 3.4 - 5.3 mmol/L    Carbon Dioxide (CO2) 27 22 - 29 mmol/L    Anion Gap 11 7 - 15 mmol/L    Urea Nitrogen 16.8 8.0 - 23.0 mg/dL    Creatinine 0.93 0.67 - 1.17 mg/dL    GFR Estimate 84 >60 mL/min/1.73m2    Calcium 9.7 8.8 - 10.2 mg/dL    Chloride 102 98 - 107 mmol/L    Glucose 103 (H) 70 - 99 mg/dL    Alkaline Phosphatase 70 40 - 150 U/L    AST 26 0 - 45 U/L    ALT 20 0 - 70 U/L    Protein Total 7.0 6.4 - 8.3 g/dL    Albumin 4.2 3.5 - 5.2 g/dL    Bilirubin Total 0.6 <=1.2 mg/dL       Imaging results Reviewed        No results found.    Total time spent was over 33 minutes.  This includes chart prep time, review of clinical data including notes from outside physicians, labs, review of  medical imaging, discussion about  plan of care, documentation and .    Signed by: Turner Sahni MD      This note has been dictated using voice recognition software. Any grammatical or context distortions are unintentional and inherent to the software      Again, thank you for allowing me to participate in the care of your patient.        Sincerely,        Turner Sahni MD

## 2023-12-04 NOTE — PROGRESS NOTES
[facility] Hematology and Oncology Progress Note    Patient: Justin Pires  MRN: [id number]  Date of Service: Dec 4, 2023       Reason for visit         Problem List Items Addressed This Visit          Immune    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) - Primary         Assessment      1.  Metastatic prostate cancer with involvement of the axillary lymph node, bones and intra-abdominal lymph nodes. He responded very well to treatment with Taxotere chemotherapy followed by hormonal therapy. Was off hormonal therapy from I believe April 2018 until May 2021.  After starting hormonal therapy PSA is undetectable.  Choline PET scan in April 2022 still showed low-level activity in the iliac lymph nodes.  Question of small uptake in the thoracic vertebral body which was felt to be not clearly metastatic on the MRI which was done subsequently.  Currently treatment with with Anti-androgen therapy with Lupron alone. Most recent C-14 PET scan shows no active disease except from a small area of urethrovesical junction which appears to have some activity. PSA is undetectable when tested in May 2023.  2.  Was given recommendation to consider salvage radiation therapy to treat pelvic lymph nodes.  However he has not done that.  He is doing quite well in spite of that.  3.  Hormone deprivation side effects.  Justin is due for his Eligard.  4.  Musculoskeletal stiffness in his hands.       Plan      Discussed with the patient at this time we will continue with Eligard.  The patient wants to delay his next shot by about couple of months.  He wants to come back in March to get it done.  He is going for a ski break and wants to have some muscular strength before he goes for his ski vacation.  He will otherwise continue Eligard every 6 months.  Follow-up on the PSA  He should continue on alendronate to maintain his bone density.  Use of Cialis as needed.    Medical decision making      Moderately complex.      Risk       Significant risk of morbidity mortality.  Significant risk of side effects from intervention.       Cancer Staging   No matching staging information was found for the patient.        History of present illness        Mr. Justin Pires male with a history of elevated PSA. He is actually prior history of prostate cancer diagnosed in 1992 when he presented to [facility] for a executive physical and was found to have elevated PSA. He was completely is symptomatically that point. His workup then revealed that he indeed had prostate cancer. At that point at 47 years of age he underwent retropubic prostatectomy which revealed prostate cancer involving the left lobe of the prostate grade 2 out of 3 with negative margins and negative lymph nodes. He had been in remission ever since. In January 2016 he had a PSA checked on which actually came back at 10.7. It was thought at the time that he might have prostatitis. He was given some antibiotics. He then went down to [place]. There he had his PSA checked and was down to 6. He came back in April 2016 and had a repeat PSA checked which was elevated again at 15.7. Therefore Dr. Manjarrez asked him to come and see me.     He is also been seen by his urologist Dr. Fortunato Collazo. He had MRI CT scan and bone scan done. None of them revealed any lesion.     He had a ProstaScint scan which actually showed some uptake on the right prostatic fossa.  I sent him to Dr. Browne's for another ultrasound-guided biopsy of his right prostate bed.  The biopsy was negative for any detectable prostate cancer.     He then was seen at the [facility] and got a C11 PET-choline image that showed metastatic disease with bone mets and lymph node mets. He had an axillary lymph node biopsied that was consistent with prostate cancer. He has been started on Lupron in June 2016 and in July 26th 2016 started on Taxotere. Had his first cycle end of July 2016. Tolerated it with expected side effects. His Psa  basically became undetectable very quickly. He has finished 6 cycles. Last one in November 2016.     Had C11 PET scan at [place] that showed near complete response.  Eventually stopped hormonal therapy sometime in spring 2018.  He had been followed by PSAs and C 11 choline PET scan.  His PSA started to rise in January 2021 it was 0.19.  His CA 11 choline PET scan was negative.     In April 2021 his PSA was 0.71.  His choline PET scan revealed several low-level tiny pelvic lymph nodes in the external and common iliac lymph node chains.  He started his hormonal therapy.  His testosterone level checked in 4/16/2021 showed normal testosterone level.     PSA checked on 10/5/2021 showed less than 0.1. His PET scan done on 10/4/2021 at [facility] still showed some persistent uptake in the iliac lymph nodes. He was recommended to consider radiation therapy to those lymph nodes. He is here to discuss that option.    However in October 2021 I recommended against radiation therapy.  Justin is happy with the recommendation.  Continued on Lupron therapy.  The PSA in the meantime has stayed undetectable.    Justin had another choline PET scan done at [facility] in April 2022.  It showed persistent uptake in the iliac lymph nodes.  However not a very high level of uptake.  There was a question of an uptake in the thoracic vertebral body.  The thoracic vertebral body was further evaluated with the help of an MRI which suggested that this perhaps could be inflammatory from a humeral node rather than a metastatic lesion.    Justin had consult with Dr. Richard Rey radiation oncologist at [facility].  Justin was given option of pelvic radiation up to 64 cGy including the prostate bed versus combination of Lupron with abiraterone.  I recommended against radiation and proceeding with Anti androgen therapy with Lupron alone.  Justin has been doing exceedingly well.  PSA continues to be undetectable.  Choline PET scan done  in August 2022 showed improvement in all the areas of the disease that was visible before.  No new areas.    He has continued on Eligard every 6 months.  He had a PET scan (choline PET) done at [facility] on May 2023.  On that PET scan there was a new faint choline uptake near the left aspect of the vesicourethral anastomosis was noted.  Stable mild choline uptake in the small left common iliac lymph node was noted.  No suspicious bone metastases were noted.    His PSA was again undetectable at that time.    Comes in today for scheduled follow-up.  Overall feeling well.  He is planning to go for a ski break so he wants to delay his Lupron shot.      Review of system      Details noted in the history of present illness.  A detailed review of systems is otherwise negative.      Physical exam        /79 (BP Location: Right arm, Patient Position: Sitting, Cuff Size: Adult Regular)   Pulse 95   Temp 97.6  F (36.4  C) (Oral)   Resp 16   Ht 1.829 m (6')   Wt 90.9 kg (200 lb 4.8 oz)   SpO2 95%   BMI 27.17 kg/m      GENERAL: no acute distress. Cooperative in conversation.   HEENT: pupils are equal, round and reactive. Oral mucosa is moist and intact.  RESP:Chest symmetric. Regular respiratory rate. No stridor.  ABD: Nondistended, soft.  EXTREMITIES: No lower extremity edema.   NEURO: non focal. Alert and oriented x3.   PSYCH: within normal limits. No depression or anxiety.  SKIN: warm dry intact       Lab results Reviewed      Recent Results (from the past 168 hour(s))   Comprehensive metabolic panel   Result Value Ref Range    Sodium 140 135 - 145 mmol/L    Potassium 4.4 3.4 - 5.3 mmol/L    Carbon Dioxide (CO2) 27 22 - 29 mmol/L    Anion Gap 11 7 - 15 mmol/L    Urea Nitrogen 16.8 8.0 - 23.0 mg/dL    Creatinine 0.93 0.67 - 1.17 mg/dL    GFR Estimate 84 >60 mL/min/1.73m2    Calcium 9.7 8.8 - 10.2 mg/dL    Chloride 102 98 - 107 mmol/L    Glucose 103 (H) 70 - 99 mg/dL    Alkaline Phosphatase 70 40 - 150 U/L    AST  26 0 - 45 U/L    ALT 20 0 - 70 U/L    Protein Total 7.0 6.4 - 8.3 g/dL    Albumin 4.2 3.5 - 5.2 g/dL    Bilirubin Total 0.6 <=1.2 mg/dL       Imaging results Reviewed        No results found.    Total time spent was over 33 minutes.  This includes chart prep time, review of clinical data including notes from outside physicians, labs, review of medical imaging, discussion about  plan of care, documentation and .    Signed by: Turner Sahni MD      This note has been dictated using voice recognition software. Any grammatical or context distortions are unintentional and inherent to the software

## 2023-12-04 NOTE — PROGRESS NOTES
Social Work - Distress Screen Intervention  [facility]    Identified Concern and Score from Distress Screenin. How concerned are you about your ability to eat? 0     2. How concerned are you about unintended weight loss or your current weight? 0     3. How concerned are you about feeling depressed or very sad?  2     4. How concerned are you about feeling anxious or very scared?  0     5. Do you struggle with the loss of meaning and misael in your life?  Somewhat     6. How concerned are you about work and home life issues that may be affected by your cancer?  0     7. How concerned are you about knowing what resources are available to help you?  (!) 9     8. Do you currently have what you would describe as Islam or spiritual struggles? Not at all     9. If you want to be contacted by one of our professionals, I can send a message to them right now.  Oncology Social Worker       Date of Distress Screen: 23  Data: At time of last visit, patient scored positive on distress screening.  outreached to patient today to follow up on elevated distress and introduce psychosocial services and support.  Intervention/Education provided:  contacted patient by phone to discuss distress screening results. Left voicemail message  Follow-up Required:  to remain available for support and await return call from patient    Michaela Matt, MSW, LICSW, OSW-C  Clinical - Adult Oncology  She/Her/Hers  Phone: [phone]  [facility]: M, Thu  *every other Tue, 8am-4:30pm  [facility]: W, F, *every other Tue, 8am-4:30pm

## 2023-12-08 ENCOUNTER — PATIENT OUTREACH (OUTPATIENT)
Dept: ONCOLOGY | Facility: HOSPITAL | Age: 78
End: 2023-12-08

## 2023-12-21 ENCOUNTER — TELEPHONE (OUTPATIENT)
Dept: INTERNAL MEDICINE | Facility: CLINIC | Age: 78
End: 2023-12-21

## 2023-12-21 DIAGNOSIS — H40.002 GLAUCOMA SUSPECT, LEFT: ICD-10-CM

## 2023-12-21 RX ORDER — LATANOPROST 50 UG/ML
SOLUTION/ DROPS OPHTHALMIC
Qty: 7.5 ML | Refills: 0 | OUTPATIENT
Start: 2023-12-21

## 2023-12-21 RX ORDER — LATANOPROST 50 UG/ML
SOLUTION/ DROPS OPHTHALMIC
Qty: 7.5 ML | Refills: 0 | Status: SHIPPED | OUTPATIENT
Start: 2023-12-21 | End: 2024-07-01

## 2023-12-21 NOTE — TELEPHONE ENCOUNTER
Duplicate - prescription request refused.  
Pt is out of this medication  Pt is leaving out of town as of 12/22/2023  
Detail Level: Simple
Detail Level: Detailed

## 2024-01-11 DIAGNOSIS — M10.9 ACUTE GOUTY ARTHRITIS: Primary | ICD-10-CM

## 2024-01-11 RX ORDER — PREDNISONE 20 MG/1
20 TABLET ORAL EVERY MORNING
Qty: 4 TABLET | Refills: 0 | Status: SHIPPED | OUTPATIENT
Start: 2024-01-11 | End: 2024-01-15

## 2024-01-11 RX ORDER — FEBUXOSTAT 40 MG/1
40 TABLET, FILM COATED ORAL DAILY
Qty: 90 TABLET | Refills: 3 | Status: SHIPPED | OUTPATIENT
Start: 2024-01-11 | End: 2024-03-21

## 2024-01-30 DIAGNOSIS — M10.9 ACUTE GOUTY ARTHRITIS: Primary | ICD-10-CM

## 2024-01-30 RX ORDER — PREDNISONE 20 MG/1
20 TABLET ORAL EVERY MORNING
Qty: 20 TABLET | Refills: 1 | Status: SHIPPED | OUTPATIENT
Start: 2024-01-30 | End: 2024-02-03

## 2024-02-13 ENCOUNTER — TELEPHONE (OUTPATIENT)
Dept: ONCOLOGY | Facility: HOSPITAL | Age: 79
End: 2024-02-13
Payer: COMMERCIAL

## 2024-02-15 NOTE — PROGRESS NOTES
[facility]: Cancer Care                                                                                          Patient was in the clinic to see Dr Sahni on 12/4.  He had a PSA level drawn.  This has been reviewed by Dr Sahni and it remains at less than 0.01.  [employer] message sent to patient letting him know the good results.    Signature:  Wilda Dahl RN

## 2024-02-20 ENCOUNTER — TELEPHONE (OUTPATIENT)
Dept: ONCOLOGY | Facility: HOSPITAL | Age: 79
End: 2024-02-20
Payer: COMMERCIAL

## 2024-02-24 ENCOUNTER — HEALTH MAINTENANCE LETTER (OUTPATIENT)
Age: 79
End: 2024-02-24

## 2024-03-15 ENCOUNTER — PATIENT OUTREACH (OUTPATIENT)
Dept: ONCOLOGY | Facility: HOSPITAL | Age: 79
End: 2024-03-15
Payer: COMMERCIAL

## 2024-03-15 NOTE — PROGRESS NOTES
[facility]: Cancer Care Follow-Up Note                                    Discussion with Patient:                                                      Patient calls in today stating that he has been doing some reading from the [employer] of [employer].  He has an upcoming appointment with Dr Sahni and would like to discuss this.     Goals          General    Maintain ability to perform ADLs without difficulty             Dates of Treatment:                                                      Infusion given in last 28 days       None            Assessment:                                                      Patient states that there is a medication/novel oral hormone therapy called Reiugolix/Orgovyx.  He would like to know if Dr Sahni has heard of this and what his thoughts are on it.  He believes it is an alternative to Lupron.    Intervention/Education provided during outreach:                                                       I did ask the patient to try to upload this article into [employer].  If he cannot do that, he should bring it with him to his appointment on 3/21 when he sees Dr Sahni in the clinic.  I will get this information over to him so he is aware and to look into it prior to the appointment.  Patient was very appreciative.    Patient to follow up as scheduled at next appt  Patient to call/[employer] message with updates  Confirmed patient has clinic and triage numbers    Signature:  Wilad Dahl RN

## 2024-03-18 DIAGNOSIS — M89.9 DISORDER OF BONE AND CARTILAGE: ICD-10-CM

## 2024-03-18 DIAGNOSIS — M94.9 DISORDER OF BONE AND CARTILAGE: ICD-10-CM

## 2024-03-19 RX ORDER — ALENDRONATE SODIUM 70 MG/1
TABLET ORAL
Qty: 12 TABLET | Refills: 11 | Status: SHIPPED | OUTPATIENT
Start: 2024-03-19 | End: 2024-04-15

## 2024-03-21 ENCOUNTER — ONCOLOGY VISIT (OUTPATIENT)
Dept: ONCOLOGY | Facility: HOSPITAL | Age: 79
End: 2024-03-21
Attending: INTERNAL MEDICINE
Payer: COMMERCIAL

## 2024-03-21 ENCOUNTER — INFUSION THERAPY VISIT (OUTPATIENT)
Dept: INFUSION THERAPY | Facility: HOSPITAL | Age: 79
End: 2024-03-21
Attending: INTERNAL MEDICINE
Payer: COMMERCIAL

## 2024-03-21 VITALS
HEIGHT: 72 IN | OXYGEN SATURATION: 97 % | SYSTOLIC BLOOD PRESSURE: 134 MMHG | WEIGHT: 202.4 LBS | RESPIRATION RATE: 16 BRPM | BODY MASS INDEX: 27.41 KG/M2 | HEART RATE: 64 BPM | DIASTOLIC BLOOD PRESSURE: 76 MMHG | TEMPERATURE: 97.7 F

## 2024-03-21 DIAGNOSIS — C77.5 PROSTATE CANCER METASTATIC TO INTRAPELVIC LYMPH NODE (H): Primary | ICD-10-CM

## 2024-03-21 DIAGNOSIS — C61 PROSTATE CANCER METASTATIC TO INTRAPELVIC LYMPH NODE (H): Primary | ICD-10-CM

## 2024-03-21 DIAGNOSIS — C77.5 PROSTATE CANCER METASTATIC TO INTRAPELVIC LYMPH NODE (H): ICD-10-CM

## 2024-03-21 DIAGNOSIS — C61 PROSTATE CANCER METASTATIC TO INTRAPELVIC LYMPH NODE (H): ICD-10-CM

## 2024-03-21 LAB — PSA SERPL DL<=0.01 NG/ML-MCNC: <0.01 NG/ML (ref 0–6.5)

## 2024-03-21 PROCEDURE — 84153 ASSAY OF PSA TOTAL: CPT

## 2024-03-21 PROCEDURE — 36415 COLL VENOUS BLD VENIPUNCTURE: CPT

## 2024-03-21 PROCEDURE — 96402 CHEMO HORMON ANTINEOPL SQ/IM: CPT

## 2024-03-21 PROCEDURE — G2211 COMPLEX E/M VISIT ADD ON: HCPCS | Performed by: INTERNAL MEDICINE

## 2024-03-21 PROCEDURE — 250N000011 HC RX IP 250 OP 636: Mod: JZ | Performed by: INTERNAL MEDICINE

## 2024-03-21 PROCEDURE — 99214 OFFICE O/P EST MOD 30 MIN: CPT | Performed by: INTERNAL MEDICINE

## 2024-03-21 PROCEDURE — G0463 HOSPITAL OUTPT CLINIC VISIT: HCPCS | Mod: 25 | Performed by: INTERNAL MEDICINE

## 2024-03-21 PROCEDURE — G0463 HOSPITAL OUTPT CLINIC VISIT: HCPCS | Performed by: INTERNAL MEDICINE

## 2024-03-21 RX ADMIN — LEUPROLIDE ACETATE 45 MG: 45 INJECTION, SUSPENSION, EXTENDED RELEASE SUBCUTANEOUS at 11:41

## 2024-03-21 ASSESSMENT — PAIN SCALES - GENERAL: PAINLEVEL: NO PAIN (0)

## 2024-03-21 NOTE — PROGRESS NOTES
Infusion Nursing Note:  Justin Pires presents today for Eligard injection.    Patient seen by provider today: Yes: Dr. Sahni   present during visit today: Not Applicable.    Note: Administered Eligard subcutaneous (left arm per patient request) as ordered per provider. Tolerated well with no issues.      Intravenous Access:  Labs drawn without difficulty.    Treatment Conditions:  Not Applicable.      Post Infusion Assessment:  Patient tolerated injection without incident.       Discharge Plan:   Patient verbalized understanding of discharge instructions and all questions answered.  Patient discharged in stable condition accompanied by: self.  Departure Mode: Ambulatory.      FRANCINE CRUZ RN

## 2024-03-21 NOTE — PROGRESS NOTES
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  Metastatic prostate cancer with involvement of the axillary lymph node, bones and intra-abdominal lymph nodes. He responded very well to treatment with Taxotere chemotherapy followed by hormonal therapy. Was off hormonal therapy from I believe April 2018 until May 2021.  After starting hormonal therapy PSA is undetectable.  Choline PET scan in April 2022 still showed low-level activity in the iliac lymph nodes.  Question of small uptake in the thoracic vertebral body which was felt to be not clearly metastatic on the MRI which was done subsequently.  Currently treatment with with Anti-androgen therapy with Lupron alone. Most recent C-14 PET scan shows no active disease except from a small area of urethrovesical junction which appears to have some activity. PSA is undetectable when tested in May 2023.  Last Lupron shot was in June 2023 which was a 6-month shot.  Took a 3-month break in order to enjoy some skin.  2.  Was given recommendation to consider salvage radiation therapy to treat pelvic lymph nodes.  However he has not done that.  He is doing quite well in spite of that.  3.  Hormone deprivation side effects.  Justin is due for his Eligard.  4.  Musculoskeletal stiffness in his hands.   5.  Recent mishap on ski slopes the first week of March 2024.  Fell forward and had neck injury.  Turns out he had nondisplaced fracture of the odontoid.  Was treated at [facility] [place] [facility] and given the option of surgical intervention versus neck collar and conservative management.  Justin is on conservative management.  Seems to be doing okay.    Plan      Reviewed notes from each unique source.  Reviewed each unique test.   Ordered tests if indicated.  Independently interpreted the results of lab tests and radiological exams.  Personally reviewed the images.  Reviewed images from [facility] as well.    We will resume Eligard.  Depending upon the dose of the injection that we have all will come back in either 4 months or 6 months.  Follow-up on the PSA.  Continue on alendronate to maintain bone density.  Calcium and vitamin D supplementation as well.  Avoid dangerous activities.  The longitudinal plan of care for the diagnosis(es)/condition(s) as documented were addressed during this visit. Due to the added complexity in care, I will continue to support Justin in the subsequent management and with ongoing continuity of care.     Medical decision making      Moderately complex.   Reviewed notes from each unique source.  Reviewed each unique test.  Ordered tests if indicated.  Independently interpreted the results of lab tests and radiological exams.  Personally reviewed the images.       Risk      Significant risk of morbidity mortality.  Significant risk of side effects from intervention.       Cancer Staging   No matching staging information was found for the patient.        History of present illness        Mr. Justin Pires male with a history of elevated PSA. He is actually prior history of prostate cancer diagnosed in 1992 when he presented to [facility] for a executive physical and was found to have elevated PSA. He was completely is symptomatically that point. His workup then revealed that he indeed had prostate cancer. At that point at 47 years of age he underwent retropubic prostatectomy which revealed prostate cancer involving the left lobe of the prostate grade 2 out of 3 with negative margins and negative lymph nodes. He had been in remission ever since. In January 2016 he had a PSA checked on which actually came back at 10.7. It was thought at the time that he might have prostatitis. He was given some antibiotics. He then  went down to [place]. There he had his PSA checked and was down to 6. He came back in April 2016 and had a repeat PSA checked which was elevated again at 15.7. Therefore Dr. Manjarrez asked him to come and see me.     He is also been seen by his urologist Dr. Fortunato Collazo. He had MRI CT scan and bone scan done. None of them revealed any lesion.     He had a ProstaScint scan which actually showed some uptake on the right prostatic fossa.  I sent him to Dr. Browne's for another ultrasound-guided biopsy of his right prostate bed.  The biopsy was negative for any detectable prostate cancer.     He then was seen at the [facility] and got a C11 PET-choline image that showed metastatic disease with bone mets and lymph node mets. He had an axillary lymph node biopsied that was consistent with prostate cancer. He has been started on Lupron in June 2016 and in July 26th 2016 started on Taxotere. Had his first cycle end of July 2016. Tolerated it with expected side effects. His Psa basically became undetectable very quickly. He has finished 6 cycles. Last one in November 2016.     Had C11 PET scan at [place] that showed near complete response.  Eventually stopped hormonal therapy sometime in spring 2018.  He had been followed by PSAs and C 11 choline PET scan.  His PSA started to rise in January 2021 it was 0.19.  His CA 11 choline PET scan was negative.     In April 2021 his PSA was 0.71.  His choline PET scan revealed several low-level tiny pelvic lymph nodes in the external and common iliac lymph node chains.  He started his hormonal therapy.  His testosterone level checked in 4/16/2021 showed normal testosterone level.     PSA checked on 10/5/2021 showed less than 0.1. His PET scan done on 10/4/2021 at [facility] still showed some persistent uptake in the iliac lymph nodes. He was recommended to consider radiation therapy to those lymph nodes. He is here to discuss that option.    However in October 2021 I recommended against  radiation therapy.  Justin is happy with the recommendation.  Continued on Lupron therapy.  The PSA in the meantime has stayed undetectable.    Justin had another choline PET scan done at [facility] in April 2022.  It showed persistent uptake in the iliac lymph nodes.  However not a very high level of uptake.  There was a question of an uptake in the thoracic vertebral body.  The thoracic vertebral body was further evaluated with the help of an MRI which suggested that this perhaps could be inflammatory from a humeral node rather than a metastatic lesion.    Justin had consult with Dr. Richard Rey radiation oncologist at [facility].  Justin was given option of pelvic radiation up to 64 cGy including the prostate bed versus combination of Lupron with abiraterone.  I recommended against radiation and proceeding with Anti androgen therapy with Lupron alone.  Justin has been doing exceedingly well.  PSA continues to be undetectable.  Choline PET scan done in August 2022 showed improvement in all the areas of the disease that was visible before.  No new areas.    He has continued on Eligard every 6 months.  He had a PET scan (choline PET) done at [facility] on May 2023.  On that PET scan there was a new faint choline uptake near the left aspect of the vesicourethral anastomosis was noted.  Stable mild choline uptake in the small left common iliac lymph node was noted.  No suspicious bone metastases were noted.    Last Lupron shot was given in June 2023.  After that in December we decided to give him a break.  He was trying to enjoy some skiing.  He did end up going to [place] and ski did however met with an accident and injured his neck.  Very likely that it was not a more severe injury.  Was noted to have a nondisplaced odontoid fracture.  He is currently on a neck collar.  Rigidly immobilized.  Some of the androgen deprivation side effects did lakhwinder after few weeks of not taking Lupron.      Review  of system      Details noted in the history of present illness.  A detailed review of systems is otherwise negative.      Physical exam        /76 (BP Location: Left arm, Patient Position: Sitting, Cuff Size: Adult Large)   Pulse 64   Temp 97.7  F (36.5  C) (Oral)   Resp 16   Ht 1.829 m (6')   Wt 91.8 kg (202 lb 6.4 oz)   SpO2 97%   BMI 27.45 kg/m      GENERAL: no acute distress. Cooperative in conversation.   HEENT: pupils are equal, round and reactive. Oral mucosa is moist and intact.  Neck in a collar.  RESP:Chest symmetric. Regular respiratory rate. No stridor.  ABD: Nondistended, soft.  EXTREMITIES: No lower extremity edema.   NEURO: non focal. Alert and oriented x3.   PSYCH: within normal limits. No depression or anxiety.  SKIN: warm dry intact.  Slight shaving injury above upper lip.      Lab results Reviewed      No results found for this or any previous visit (from the past 168 hour(s)).    Imaging results Reviewed        DX Cervical Spine 2-3 Views    Result Date: 3/11/2024  EXAM:  DX CERVICAL SPINE 2-3 VIEWS    Diffuse demineralization. Redemonstrated mildly displaced fracture through the dens, better visualized on the recent CT dated 03/07/2024. No significant change in alignment. Advanced spondylotic changes and degenerative disc disease with multilevel advanced interspace narrowing. Straightening of the normal cervical lordosis.     MR Cervical Spine w/o & w Contrast    Result Date: 3/11/2024  EXAM: MR CERVICAL SPINE WITHOUT AND WITH IV CONTRAST COMPARISON: CT cervical spine 03/07/2024 and Choline PET/CT 05/05/2023. FINDINGS: Slightly limited exam by motion. Better detailed on the recent CT acute type II odontoid fracture, with minimal dorsal dens angulation and prominent partially calcified dorsal ligamentous calcification slightly narrowing ventral subarachnoid space, however patent foramen magnum and upper cervical spinal canal. Small amount of fluid tracking along the fracture plane  with surrounding marrow edema and trace fluid within bilateral C1-C2 articulations, without significant widening. Trace fluid signal superior to the dens. Mild reactive prevertebral soft tissue edema and enhancement, without definite ligamentous disruption at the craniocervical junction. No epidural hematoma. Normal cord morphology and signal. Mild felt degenerative edema involving C6 and C7 vertebral bodies, without height loss, with preserved facet alignment without effusion. No focal enhancement osseous lesions. Partially visualized probably T3 superior endplate prominent Schmorl's node with mild edema and enhancement. Degenerative trace anterolisthesis C7-T1 and retrolisthesis C6-C7. Partial posterior intervertebral ankylosis C4-C5, similar to localizing CT from PET/CT 05/05/2023. Multilevel spondylosis with disc osteophyte complexes, uncovertebral and facet hypertrophy with moderate/advanced right, mild/moderate left neuroforaminal narrowing C3-C4, mild left C4-C5 and bilateral C5-C6, mild-to-moderate bilateral C6-7 and C7-T1. Very mild spinal canal narrowing at C4-C5 through C6-7. Preserved vertebral arterial flow void. Partially visualized on  images probably small left pleural effusion.    Better detailed on the recent CT acute type II odontoid fracture, with minimal dorsal dens angulation. Mild reactive prevertebral soft tissue edema, without definite ligamentous disruption at the craniocervical junction. No significant narrowing of the foramen magnum or cervical spinal canal. No epidural hematoma. Normal cord morphology and signal. Preserved vertebral arterial flow voids.    CT Cervical Spine w/o Contrast    Result Date: 3/7/2024  EXAM: CT CERVICAL SPINE WITHOUT IV CONTRAST COMPARISON: None FINDINGS: There is acute oblique fracture through the base of the dens consistent with type II odontoid fracture. No retropulsion.. Diffuse osseous demineralization. Normal craniocervical alignment. Multilevel  facet and uncovertebral hypertrophy. Chronic  degenerative disc disease including moderate disc height loss and endplate degenerative changes at C6-C7 level. No high-grade spinal canal stenosis. No posterior spinal soft tissue hematoma. Lung apices are clear.    -Acute type II odontoid fracture. Unstable fracture which requires immediate cervical immobilization and urgent neurosurgical consult. -Moderate cervical spondylosis. Result discussed with PATRICIA MARTIN on 3/7/2024 4:08 PM.       Signed by: Turner Sahni MD      This note has been dictated using voice recognition software. Any grammatical or context distortions are unintentional and inherent to the software

## 2024-03-21 NOTE — PROGRESS NOTES
Oncology Rooming Note    March 21, 2024 9:52 AM   Justin Pires is a 78 year old male who presents for:    Chief Complaint   Patient presents with    Oncology Clinic Visit     Return visit 4 months with lab and injection. Prostate cancer metastatic to intrapelvic lymph node.     Initial Vitals: /76 (BP Location: Left arm, Patient Position: Sitting, Cuff Size: Adult Large)   Pulse 64   Temp 97.7  F (36.5  C) (Oral)   Resp 16   Ht 1.829 m (6')   Wt 91.8 kg (202 lb 6.4 oz)   SpO2 97%   BMI 27.45 kg/m   Estimated body mass index is 27.45 kg/m  as calculated from the following:    Height as of this encounter: 1.829 m (6').    Weight as of this encounter: 91.8 kg (202 lb 6.4 oz). Body surface area is 2.16 meters squared.  No Pain (0) Comment: Data Unavailable   No LMP for male patient.  Allergies reviewed: Yes  Medications reviewed: Yes    Medications: Medication refills not needed today.  Pharmacy name entered into [employer]:    [id number] - [place], [place] - [address]  [id number] - [place], [place] - [address] [facility], [address] [place] [address] [id number] - [place], [place] - [facility] [address] AT [address]    Frailty Screening:   Is the patient here for a new oncology consult visit in cancer care? 2. No      Clinical concerns: Return visit 4 months with lab and injection. Prostate cancer metastatic to intrapelvic lymph node.      Radha Loaiza, [facility]

## 2024-03-21 NOTE — LETTER
3/21/2024         RE: Justin Pires  [address] [place] [address]        Dear Colleague,    Thank you for referring your patient, Justin Pires, to the [facility]. Please see a copy of my visit note below.    Oncology Rooming Note    March 21, 2024 9:52 AM   Justin Pires is a 78 year old male who presents for:    Chief Complaint   Patient presents with     Oncology Clinic Visit     Return visit 4 months with lab and injection. Prostate cancer metastatic to intrapelvic lymph node.     Initial Vitals: /76 (BP Location: Left arm, Patient Position: Sitting, Cuff Size: Adult Large)   Pulse 64   Temp 97.7  F (36.5  C) (Oral)   Resp 16   Ht 1.829 m (6')   Wt 91.8 kg (202 lb 6.4 oz)   SpO2 97%   BMI 27.45 kg/m   Estimated body mass index is 27.45 kg/m  as calculated from the following:    Height as of this encounter: 1.829 m (6').    Weight as of this encounter: 91.8 kg (202 lb 6.4 oz). Body surface area is 2.16 meters squared.  No Pain (0) Comment: Data Unavailable   No LMP for male patient.  Allergies reviewed: Yes  Medications reviewed: Yes    Medications: Medication refills not needed today.  Pharmacy name entered into [facility]:    [id number] - [place], [place] - [address]  [id number] - [place], [place] - [address], [address] [place] [address] [id number] - [place], [place] - [address] [place] [address] AT SEC OF [address]    Frailty Screening:   Is the patient here for a new oncology consult visit in cancer care? 2. No      Clinical concerns: Return visit 4 months with lab and injection. Prostate cancer metastatic to intrapelvic lymph node.      Radha Loaiza, [facility] Hematology and Oncology Progress Note    Patient: Justin Pires  MRN: [id number]  Date of Service: Mar 21, 2024       Reason for visit         Problem List Items Addressed This Visit          Immune    Prostate cancer metastatic to  intrapelvic lymph node (H) - Primary         Assessment      1.  Metastatic prostate cancer with involvement of the axillary lymph node, bones and intra-abdominal lymph nodes. He responded very well to treatment with Taxotere chemotherapy followed by hormonal therapy. Was off hormonal therapy from I believe April 2018 until May 2021.  After starting hormonal therapy PSA is undetectable.  Choline PET scan in April 2022 still showed low-level activity in the iliac lymph nodes.  Question of small uptake in the thoracic vertebral body which was felt to be not clearly metastatic on the MRI which was done subsequently.  Currently treatment with with Anti-androgen therapy with Lupron alone. Most recent C-14 PET scan shows no active disease except from a small area of urethrovesical junction which appears to have some activity. PSA is undetectable when tested in May 2023.  Last Lupron shot was in June 2023 which was a 6-month shot.  Took a 3-month break in order to enjoy some skin.  2.  Was given recommendation to consider salvage radiation therapy to treat pelvic lymph nodes.  However he has not done that.  He is doing quite well in spite of that.  3.  Hormone deprivation side effects.  Justin is due for his Eligard.  4.  Musculoskeletal stiffness in his hands.   5.  Recent mishap on ski slopes the first week of March 2024.  Fell forward and had neck injury.  Turns out he had nondisplaced fracture of the odontoid.  Was treated at [facility] and given the option of surgical intervention versus neck collar and conservative management.  Justin is on conservative management.  Seems to be doing okay.    Plan      Reviewed notes from each unique source.  Reviewed each unique test.  Ordered tests if indicated.  Independently interpreted the results of lab tests and radiological exams.  Personally reviewed the images.  Reviewed images from [facility] as well.    We will resume Eligard.  Depending upon the dose of the injection that  we have all will come back in either 4 months or 6 months.  Follow-up on the PSA.  Continue on alendronate to maintain bone density.  Calcium and vitamin D supplementation as well.  Avoid dangerous activities.  The longitudinal plan of care for the diagnosis(es)/condition(s) as documented were addressed during this visit. Due to the added complexity in care, I will continue to support Justin in the subsequent management and with ongoing continuity of care.     Medical decision making      Moderately complex.   Reviewed notes from each unique source.  Reviewed each unique test.  Ordered tests if indicated.  Independently interpreted the results of lab tests and radiological exams.  Personally reviewed the images.       Risk      Significant risk of morbidity mortality.  Significant risk of side effects from intervention.       Cancer Staging   No matching staging information was found for the patient.        History of present illness        Mr. Justin Pires male with a history of elevated PSA. He is actually prior history of prostate cancer diagnosed in 1992 when he presented to [facility] for a executive physical and was found to have elevated PSA. He was completely is symptomatically that point. His workup then revealed that he indeed had prostate cancer. At that point at 47 years of age he underwent retropubic prostatectomy which revealed prostate cancer involving the left lobe of the prostate grade 2 out of 3 with negative margins and negative lymph nodes. He had been in remission ever since. In January 2016 he had a PSA checked on which actually came back at 10.7. It was thought at the time that he might have prostatitis. He was given some antibiotics. He then went down to [place]. There he had his PSA checked and was down to 6. He came back in April 2016 and had a repeat PSA checked which was elevated again at 15.7. Therefore Dr. Manjarrez asked him to come and see me.     He is also been seen by his urologist  Dr. Fortunato Collazo. He had MRI CT scan and bone scan done. None of them revealed any lesion.     He had a ProstaScint scan which actually showed some uptake on the right prostatic fossa.  I sent him to Dr. Browne's for another ultrasound-guided biopsy of his right prostate bed.  The biopsy was negative for any detectable prostate cancer.     He then was seen at the [facility] and got a C11 PET-choline image that showed metastatic disease with bone mets and lymph node mets. He had an axillary lymph node biopsied that was consistent with prostate cancer. He has been started on Lupron in June 2016 and in July 26th 2016 started on Taxotere. Had his first cycle end of July 2016. Tolerated it with expected side effects. His Psa basically became undetectable very quickly. He has finished 6 cycles. Last one in November 2016.     Had C11 PET scan at [place] that showed near complete response.  Eventually stopped hormonal therapy sometime in spring 2018.  He had been followed by PSAs and C 11 choline PET scan.  His PSA started to rise in January 2021 it was 0.19.  His CA 11 choline PET scan was negative.     In April 2021 his PSA was 0.71.  His choline PET scan revealed several low-level tiny pelvic lymph nodes in the external and common iliac lymph node chains.  He started his hormonal therapy.  His testosterone level checked in 4/16/2021 showed normal testosterone level.     PSA checked on 10/5/2021 showed less than 0.1. His PET scan done on 10/4/2021 at [facility] still showed some persistent uptake in the iliac lymph nodes. He was recommended to consider radiation therapy to those lymph nodes. He is here to discuss that option.    However in October 2021 I recommended against radiation therapy.  Justin is happy with the recommendation.  Continued on Lupron therapy.  The PSA in the meantime has stayed undetectable.    Justin had another choline PET scan done at [facility] in April 2022.  It showed persistent uptake in the iliac  lymph nodes.  However not a very high level of uptake.  There was a question of an uptake in the thoracic vertebral body.  The thoracic vertebral body was further evaluated with the help of an MRI which suggested that this perhaps could be inflammatory from a humeral node rather than a metastatic lesion.    Justin had consult with Dr. Richard Rey radiation oncologist at [facility].  Justin was given option of pelvic radiation up to 64 cGy including the prostate bed versus combination of Lupron with abiraterone.  I recommended against radiation and proceeding with Anti androgen therapy with Lupron alone.  Justin has been doing exceedingly well.  PSA continues to be undetectable.  Choline PET scan done in August 2022 showed improvement in all the areas of the disease that was visible before.  No new areas.    He has continued on Eligard every 6 months.  He had a PET scan (choline PET) done at [facility] on May 2023.  On that PET scan there was a new faint choline uptake near the left aspect of the vesicourethral anastomosis was noted.  Stable mild choline uptake in the small left common iliac lymph node was noted.  No suspicious bone metastases were noted.    Last Lupron shot was given in June 2023.  After that in December we decided to give him a break.  He was trying to enjoy some skiing.  He did end up going to [place] and ski did however met with an accident and injured his neck.  Very likely that it was not a more severe injury.  Was noted to have a nondisplaced odontoid fracture.  He is currently on a neck collar.  Rigidly immobilized.  Some of the androgen deprivation side effects did lakhwinder after few weeks of not taking Lupron.      Review of system      Details noted in the history of present illness.  A detailed review of systems is otherwise negative.      Physical exam        /76 (BP Location: Left arm, Patient Position: Sitting, Cuff Size: Adult Large)   Pulse 64   Temp 97.7   F (36.5  C) (Oral)   Resp 16   Ht 1.829 m (6')   Wt 91.8 kg (202 lb 6.4 oz)   SpO2 97%   BMI 27.45 kg/m      GENERAL: no acute distress. Cooperative in conversation.   HEENT: pupils are equal, round and reactive. Oral mucosa is moist and intact.  Neck in a collar.  RESP:Chest symmetric. Regular respiratory rate. No stridor.  ABD: Nondistended, soft.  EXTREMITIES: No lower extremity edema.   NEURO: non focal. Alert and oriented x3.   PSYCH: within normal limits. No depression or anxiety.  SKIN: warm dry intact.  Slight shaving injury above upper lip.      Lab results Reviewed      No results found for this or any previous visit (from the past 168 hour(s)).    Imaging results Reviewed        DX Cervical Spine 2-3 Views    Result Date: 3/11/2024  EXAM:  DX CERVICAL SPINE 2-3 VIEWS    Diffuse demineralization. Redemonstrated mildly displaced fracture through the dens, better visualized on the recent CT dated 03/07/2024. No significant change in alignment. Advanced spondylotic changes and degenerative disc disease with multilevel advanced interspace narrowing. Straightening of the normal cervical lordosis.     MR Cervical Spine w/o & w Contrast    Result Date: 3/11/2024  EXAM: MR CERVICAL SPINE WITHOUT AND WITH IV CONTRAST COMPARISON: CT cervical spine 03/07/2024 and Choline PET/CT 05/05/2023. FINDINGS: Slightly limited exam by motion. Better detailed on the recent CT acute type II odontoid fracture, with minimal dorsal dens angulation and prominent partially calcified dorsal ligamentous calcification slightly narrowing ventral subarachnoid space, however patent foramen magnum and upper cervical spinal canal. Small amount of fluid tracking along the fracture plane with surrounding marrow edema and trace fluid within bilateral C1-C2 articulations, without significant widening. Trace fluid signal superior to the dens. Mild reactive prevertebral soft tissue edema and enhancement, without definite ligamentous  disruption at the craniocervical junction. No epidural hematoma. Normal cord morphology and signal. Mild felt degenerative edema involving C6 and C7 vertebral bodies, without height loss, with preserved facet alignment without effusion. No focal enhancement osseous lesions. Partially visualized probably T3 superior endplate prominent Schmorl's node with mild edema and enhancement. Degenerative trace anterolisthesis C7-T1 and retrolisthesis C6-C7. Partial posterior intervertebral ankylosis C4-C5, similar to localizing CT from PET/CT 05/05/2023. Multilevel spondylosis with disc osteophyte complexes, uncovertebral and facet hypertrophy with moderate/advanced right, mild/moderate left neuroforaminal narrowing C3-C4, mild left C4-C5 and bilateral C5-C6, mild-to-moderate bilateral C6-7 and C7-T1. Very mild spinal canal narrowing at C4-C5 through C6-7. Preserved vertebral arterial flow void. Partially visualized on  images probably small left pleural effusion.    Better detailed on the recent CT acute type II odontoid fracture, with minimal dorsal dens angulation. Mild reactive prevertebral soft tissue edema, without definite ligamentous disruption at the craniocervical junction. No significant narrowing of the foramen magnum or cervical spinal canal. No epidural hematoma. Normal cord morphology and signal. Preserved vertebral arterial flow voids.    CT Cervical Spine w/o Contrast    Result Date: 3/7/2024  EXAM: CT CERVICAL SPINE WITHOUT IV CONTRAST COMPARISON: None FINDINGS: There is acute oblique fracture through the base of the dens consistent with type II odontoid fracture. No retropulsion.. Diffuse osseous demineralization. Normal craniocervical alignment. Multilevel facet and uncovertebral hypertrophy. Chronic  degenerative disc disease including moderate disc height loss and endplate degenerative changes at C6-C7 level. No high-grade spinal canal stenosis. No posterior spinal soft tissue hematoma. Lung apices  are clear.    -Acute type II odontoid fracture. Unstable fracture which requires immediate cervical immobilization and urgent neurosurgical consult. -Moderate cervical spondylosis. Result discussed with PATRICIA MARTIN on 3/7/2024 4:08 PM.       Signed by: Turner Sahni MD      This note has been dictated using voice recognition software. Any grammatical or context distortions are unintentional and inherent to the software       Again, thank you for allowing me to participate in the care of your patient.        Sincerely,        Turner Sahni MD

## 2024-04-10 ENCOUNTER — MYC MEDICAL ADVICE (OUTPATIENT)
Dept: INTERNAL MEDICINE | Facility: CLINIC | Age: 79
End: 2024-04-10
Payer: COMMERCIAL

## 2024-04-15 ENCOUNTER — VIRTUAL VISIT (OUTPATIENT)
Dept: INTERNAL MEDICINE | Facility: CLINIC | Age: 79
End: 2024-04-15
Payer: COMMERCIAL

## 2024-04-15 ENCOUNTER — TELEPHONE (OUTPATIENT)
Dept: INTERNAL MEDICINE | Facility: CLINIC | Age: 79
End: 2024-04-15

## 2024-04-15 DIAGNOSIS — C77.5 PROSTATE CANCER METASTATIC TO INTRAPELVIC LYMPH NODE (H): ICD-10-CM

## 2024-04-15 DIAGNOSIS — S12.100S CLOSED ODONTOID FRACTURE, SEQUELA: ICD-10-CM

## 2024-04-15 DIAGNOSIS — M89.9 DISORDER OF BONE AND CARTILAGE: ICD-10-CM

## 2024-04-15 DIAGNOSIS — C61 PROSTATE CANCER METASTATIC TO INTRAPELVIC LYMPH NODE (H): ICD-10-CM

## 2024-04-15 DIAGNOSIS — M94.9 DISORDER OF BONE AND CARTILAGE: ICD-10-CM

## 2024-04-15 DIAGNOSIS — R25.2 LEG CRAMPS: ICD-10-CM

## 2024-04-15 DIAGNOSIS — M10.9 ACUTE GOUTY ARTHRITIS: ICD-10-CM

## 2024-04-15 DIAGNOSIS — Z79.83 LONG TERM (CURRENT) USE OF BISPHOSPHONATES: ICD-10-CM

## 2024-04-15 DIAGNOSIS — Z00.00 PREVENTATIVE HEALTH CARE: Primary | ICD-10-CM

## 2024-04-15 DIAGNOSIS — K52.831 COLLAGENOUS COLITIS: ICD-10-CM

## 2024-04-15 DIAGNOSIS — F32.5 MAJOR DEPRESSIVE DISORDER WITH SINGLE EPISODE, IN REMISSION (H): ICD-10-CM

## 2024-04-15 DIAGNOSIS — Z79.899 OTHER LONG TERM (CURRENT) DRUG THERAPY: ICD-10-CM

## 2024-04-15 DIAGNOSIS — E03.4 HYPOTHYROIDISM DUE TO ACQUIRED ATROPHY OF THYROID: ICD-10-CM

## 2024-04-15 DIAGNOSIS — G62.9 NEUROPATHY: ICD-10-CM

## 2024-04-15 DIAGNOSIS — E78.00 PURE HYPERCHOLESTEROLEMIA: ICD-10-CM

## 2024-04-15 PROCEDURE — 99214 OFFICE O/P EST MOD 30 MIN: CPT | Mod: 95 | Performed by: INTERNAL MEDICINE

## 2024-04-15 PROCEDURE — G0439 PPPS, SUBSEQ VISIT: HCPCS | Mod: 95 | Performed by: INTERNAL MEDICINE

## 2024-04-15 RX ORDER — ESCITALOPRAM OXALATE 10 MG/1
10 TABLET ORAL DAILY
Qty: 90 TABLET | Refills: 3 | Status: SHIPPED | OUTPATIENT
Start: 2024-04-15 | End: 2025-04-15

## 2024-04-15 RX ORDER — LEVOTHYROXINE SODIUM 125 UG/1
125 TABLET ORAL DAILY
Qty: 90 TABLET | Refills: 3 | Status: SHIPPED | OUTPATIENT
Start: 2024-04-15

## 2024-04-15 RX ORDER — MAGNESIUM OXIDE 400 MG/1
400 TABLET ORAL DAILY
COMMUNITY
Start: 2024-04-15

## 2024-04-15 RX ORDER — FLUVASTATIN 20 MG/1
20 CAPSULE ORAL AT BEDTIME
Qty: 90 CAPSULE | Refills: 3 | Status: SHIPPED | OUTPATIENT
Start: 2024-04-15

## 2024-04-15 RX ORDER — CYANOCOBALAMIN 1000 UG/ML
1000 INJECTION, SOLUTION INTRAMUSCULAR; SUBCUTANEOUS
Status: ACTIVE | OUTPATIENT
Start: 2024-04-16

## 2024-04-15 RX ORDER — LEUPROLIDE ACETATE 45 MG
45 KIT INTRAMUSCULAR
COMMUNITY
Start: 2024-04-15

## 2024-04-15 RX ORDER — ALENDRONATE SODIUM 70 MG/1
TABLET ORAL
Qty: 12 TABLET | Refills: 11 | Status: SHIPPED | OUTPATIENT
Start: 2024-04-15

## 2024-04-15 SDOH — HEALTH STABILITY: PHYSICAL HEALTH: ON AVERAGE, HOW MANY DAYS PER WEEK DO YOU ENGAGE IN MODERATE TO STRENUOUS EXERCISE (LIKE A BRISK WALK)?: 0 DAYS

## 2024-04-15 SDOH — HEALTH STABILITY: PHYSICAL HEALTH: ON AVERAGE, HOW MANY MINUTES DO YOU ENGAGE IN EXERCISE AT THIS LEVEL?: 0 MIN

## 2024-04-15 ASSESSMENT — SOCIAL DETERMINANTS OF HEALTH (SDOH): HOW OFTEN DO YOU GET TOGETHER WITH FRIENDS OR RELATIVES?: TWICE A WEEK

## 2024-04-15 ASSESSMENT — PATIENT HEALTH QUESTIONNAIRE - PHQ9
SUM OF ALL RESPONSES TO PHQ QUESTIONS 1-9: 2
10. IF YOU CHECKED OFF ANY PROBLEMS, HOW DIFFICULT HAVE THESE PROBLEMS MADE IT FOR YOU TO DO YOUR WORK, TAKE CARE OF THINGS AT HOME, OR GET ALONG WITH OTHER PEOPLE: NOT DIFFICULT AT ALL
SUM OF ALL RESPONSES TO PHQ QUESTIONS 1-9: 2

## 2024-04-15 NOTE — PROGRESS NOTES
"Preventive Care Visit  [facility]  Jalil Manjarrez MD, Internal Medicine  Apr 15, 2024  {Provider  Link to [facility] :340827}  Justin is a 78 year old who is being evaluated via a billable video visit.    {ROOMING STAFF complete during rooming of virtual visit (Optional):530705}  {If patient encounters technical issues they should call [phone] :031470}    {PROVIDER CHARTING PREFERENCE:280380}    Subjective   Justin is a 78 year old, presenting for the following:  Annual Visit (Pt c/o \"zaps\" in brain when shifting eyes and neck. Pt states zaps worsening since breaking neck. Pt also c/o loss of sense of taste, recent loss of bowel and bladder control.)        4/15/2024    11:08 AM   Additional Questions   Roomed by Familia OLIVER RN     {ROOMER if patient is in their first year of Medicare a vision screen is required click here to document the Vison screen and then refresh the note to pull in results  :078604}    Video Start Time: {video visit start/end time for provider to select:697204}    Health Care Directive  Patient does not have a Health Care Directive or Living Will: {ADVANCE_DIRECTIVE_STATUS:023249}    HPI  ***  {SUPERLIST (Optional):875773}  {additonal problems for provider to add (Optional):461547}      4/15/2024   General Health   How would you rate your overall physical health? Good   Feel stress (tense, anxious, or unable to sleep) Not at all         4/15/2024   Nutrition   Diet: Regular (no restrictions)         4/15/2024   Exercise   Days per week of moderate/strenous exercise 0 days   Average minutes spent exercising at this level 0 min   (!) EXERCISE CONCERN      4/15/2024   Social Factors   Frequency of gathering with friends or relatives Twice a week   Worry food won't last until get money to buy more No   Food not last or not have enough money for food? No   Do you have housing?  Yes   Are you worried about losing your housing? No   Lack of transportation? No   Unable to get " utilities (heat,electricity)? No         4/15/2024   Fall Risk   Fallen 2 or more times in the past year? No   Trouble with walking or balance? No          4/15/2024   Activities of Daily Living- Home Safety   Needs help with the following daily activites None of the above   Safety concerns in the home No grab bars in the bathroom         4/15/2024   Dental   Dentist two times every year? Yes         4/15/2024   Hearing Screening   Hearing concerns? (!) I NEED TO ASK PEOPLE TO SPEAK UP OR REPEAT THEMSELVES.    (!) IT'S HARDER TO UNDERSTAND WOMEN'S VOICES THAN MEN'S VOICES.    (!) IT'S HARD TO FOLLOW A CONVERSATION IN A NOISY RESTAURANT OR CROWDED ROOM.    (!) TROUBLE UNDESTANDING A SPEAKER IN A PUBLIC MEETING OR [employer] SERVICE.    (!) TROUBLE FOLLOWING DIALOGUE IN THE THEATHER.    (!) TROUBLE UNDERSTANDING SOFT OR WHISPERED SPEECH.         4/15/2024   Driving Risk Screening   Patient/family members have concerns about driving No         4/15/2024   General Alertness/Fatigue Screening   Have you been more tired than usual lately? No         4/15/2024   Urinary Incontinence Screening   Bothered by leaking urine in past 6 months Yes         4/15/2024   TB Screening   Were you born outside of the [place]? No       Today's PHQ-9 Score:       4/15/2024    11:11 AM   PHQ-9 SCORE   PHQ-9 Total Score MyChart 2 (Minimal depression)   PHQ-9 Total Score 2         4/15/2024   Substance Use   Alcohol more than 3/day or more than 7/wk No   Do you have a current opioid prescription? No   How severe/bad is pain from 1 to 10? 1/10   Do you use any other substances recreationally? (!) ALCOHOL     Social History     Tobacco Use    Smoking status: Never    Smokeless tobacco: Never    Tobacco comments:     in college social smoker   Vaping Use    Vaping status: Never Used   Substance Use Topics    Alcohol use: Yes     Alcohol/week: 10.0 - 12.0 standard drinks of alcohol    Drug use: No     {Provider  If there are gaps in the social  history shown above, please follow the link to update and then refresh the note Link to Social and Substance History :947552}  ASCVD Risk   The 10-year ASCVD risk score (Tisha FAITH, et al., 2019) is: 34.7%    Values used to calculate the score:      Age: 78 years      Sex: Male      Is Non- : No      Diabetic: No      Tobacco smoker: No      Systolic Blood Pressure: 134 mmHg      Is BP treated: No      HDL Cholesterol: 39 mg/dL      Total Cholesterol: 191 mg/dL    {Link to Fracture Risk Assessment Tool (Optional):218131}    {Provider  Use the storyboard to review patient history, after sections have been marked as reviewed, refresh note to capture documentation:606090}  {Provider   REQUIRED AWV use this link to review and update sexual activity history  after section has been marked as reviewed, refresh note to capture documentation:995662}  Reviewed and updated as needed this visit by Provider     Meds  Problems               {HISTORY OPTIONS (Optional):494521}  Current providers sharing in care for this patient include:  Patient Care Team:  Jalil Manjarrez MD as PCP - General (Internal Medicine)  Turner Sahni MD as MD (Hematology & Oncology)  Jalil Manjarrez MD as Assigned PCP  Turner Sahni MD as Assigned Cancer Care Provider  Wilda Dahl, RN as Specialty Care Coordinator (Hematology & Oncology)    The following health maintenance items are reviewed in Epic and correct as of today:  Health Maintenance   Topic Date Due    RSV VACCINE (Pregnancy & 60+) (1 - 1-dose 60+ series) Never done    DTAP/TDAP/TD IMMUNIZATION (3 - Td or Tdap) 08/25/2021    LIPID  09/03/2022    TSH W/FREE T4 REFLEX  09/03/2022    PHQ-9  10/15/2024    MEDICARE ANNUAL WELLNESS VISIT  04/15/2025    ANNUAL REVIEW OF HM ORDERS  04/15/2025    FALL RISK ASSESSMENT  04/15/2025    ADVANCE CARE PLANNING  08/23/2026    GLUCOSE  12/04/2026    HEPATITIS C SCREENING  Completed    DEPRESSION ACTION  "PLAN  Completed    INFLUENZA VACCINE  Completed    Pneumococcal Vaccine: 65+ Years  Completed    ZOSTER IMMUNIZATION  Completed    COVID-19 Vaccine  Completed    IPV IMMUNIZATION  Aged Out    HPV IMMUNIZATION  Aged Out    MENINGITIS IMMUNIZATION  Aged Out    RSV MONOCLONAL ANTIBODY  Aged Out    COLORECTAL CANCER SCREENING  Discontinued       {ROS Picklists (Optional):550672}     Objective    Exam  There were no vitals taken for this visit.   Estimated body mass index is 27.45 kg/m  as calculated from the following:    Height as of 3/21/24: 1.829 m (6').    Weight as of 3/21/24: 91.8 kg (202 lb 6.4 oz).    Physical Exam  {Exam Choices (Optional):459606}  {Provider  The Mini-Cog is incomplete, use link to complete and refresh note Link to Mini-Cog :313061}      4/15/2024   Mini Cog   3 Item Recall 3 objects recalled     {A Mini-Cog total score of 0-2 suggests the possibility of dementia, score of 3-5 suggests no dementia:639137}       Video-Visit Details    Type of service:  Video Visit   Video End Time:{video visit start/end time for provider to select:229718}  Originating Location (pt. Location): {video visit patient location:017385::\"Home\"}  {PROVIDER LOCATION On-site should be selected for visits conducted from your clinic location or adjoining [facility] hospital, academic office, or other nearby [facility] building. Off-site should be selected for all other provider locations, including home:313947}  Distant Location (provider location):  {virtual location provider:099096}  Platform used for Video Visit: {Virtual Visit Platforms:824465::\"BitLeap\"}  Signed Electronically by: Jalil Manjarrez MD  {Email feedback regarding this note to [email or web address]   :488630}  Answers submitted by the patient for this visit:  Patient Health Questionnaire (Submitted on 4/15/2024)  If you checked off any problems, how difficult have these problems made it for you to do your work, take care of things at " home, or get along with other people?: Not difficult at all  PHQ9 TOTAL SCORE: 2

## 2024-04-15 NOTE — PROGRESS NOTES
ANNUAL WELLNESS VISIT--Video    Justin is a 78 year old male contacting the clinic today via video, who will use the platform: nothingGrinder for the visit.  Phone # for Doximity, or if Amwell drops:   Telephone Information:   Mobile [phone]          Assessment and Plan:     1. Preventative health care  Reviewed vaccinations and cancer screening  - REVIEW OF HEALTH MAINTENANCE PROTOCOL ORDERS    2. Major depressive disorder with single episode, in remission (H24)  Refill.  Consider higher dose  - escitalopram (LEXAPRO) 10 MG tablet; Take 1 tablet (10 mg) by mouth daily  Dispense: 90 tablet; Refill: 3    3. Osteopenia  Continue especially with recent fracture  - alendronate (FOSAMAX) 70 MG tablet; TAKE 1 TABLET BY MOUTH ONCE WEEKLY  Dispense: 12 tablet; Refill: 11    4. Neuropathy  Update labs.  Resume trial of vitamin B12 shots  - escitalopram (LEXAPRO) 10 MG tablet; Take 1 tablet (10 mg) by mouth daily  Dispense: 90 tablet; Refill: 3  - Hemoglobin A1c; Future  - CBC with Platelets & Differential; Future  - Comprehensive metabolic panel; Future  - Vitamin D Deficiency; Future  - Vitamin B12; Future  - cyanocobalamin injection 1,000 mcg    5. Pure hypercholesterolemia  Trial of low-dose fluvastatin  - fluvastatin (LESCOL) 20 MG capsule; Take 1 capsule (20 mg) by mouth at bedtime  Dispense: 90 capsule; Refill: 3  - Lipid Profile; Future    6. Hypothyroidism due to acquired atrophy of thyroid  - levothyroxine (SYNTHROID/LEVOTHROID) 125 MCG tablet; Take 1 tablet (125 mcg) by mouth daily  Dispense: 90 tablet; Refill: 3  - TSH; Future    7. Closed odontoid fracture, sequela  Reviewed [facility] records.  Update labs  - Comprehensive metabolic panel; Future  - CRP inflammation; Future  - Erythrocyte sedimentation rate auto; Future    8. 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 Acute gouty  "arthritis  Check uric acid and consider allopurinol  - Uric acid; Future    10. Collagenous colitis  No PPI.  Advise regarding magnesium    11. Leg cramps  - magnesium oxide (MAG-OX) 400 MG tablet; Take 1 tablet (400 mg) by mouth daily    12. Long term (current) use of bisphosphonates  - Vitamin D Deficiency; Future    13. Other long term (current) drug therapy  - Hemoglobin A1c; Future       Patient Instructions   Refill meds    Update labs    Hold magnesium and zinc for diarrhea    Labs for diarrhea    Course of prednisone for gout reviewed    Check uric acid    Consider higher dose of Escitalopram    Continue Lupron every 6 months noted    Trial of vitamin B12 shot for neuropathy            Return in about 1 year (around 4/15/2025) for using a video visit.       Subjective:   Justin is a 78 year old male contacting the clinic via video today for an Annual Wellness Visit.    CHIEF COMPLAINT:  Chief Complaint   Patient presents with    Annual Visit     Pt c/o \"zaps\" in brain when shifting eyes and neck. Pt states zaps worsening since breaking neck. Pt also c/o loss of sense of taste, recent loss of bowel and bladder control.       HPI: While skiing in [place], fell.  Sustained odontoid fracture.  Seen at [facility] on March 7 showing nondisplaced type II.  Placed in neck brace and healing    More neuropathic type \"zingers\".  Continues to take Escitalopram    More frequent bowel movements.  History of microscopic colitis.  Began taking magnesium 2 months ago to help leg cramps    Statins have caused leg cramps.  Advised to discontinue rosuvastatin and take Lescol which has helped somewhat    Review of Systems: Stable PSA under care of oncology receiving Lupron shots    Current Outpatient Medications   Medication Sig Dispense Refill    alendronate (FOSAMAX) 70 MG tablet TAKE 1 TABLET BY MOUTH ONCE WEEKLY 12 tablet 11    clotrimazole (LOTRIMIN) 1 % external cream Apply topically 2 times daily 30 g 11    escitalopram " (LEXAPRO) 10 MG tablet Take 1 tablet (10 mg) by mouth daily 90 tablet 3    fluvastatin (LESCOL) 20 MG capsule Take 1 capsule (20 mg) by mouth at bedtime 90 capsule 3    latanoprost (XALATAN) 0.005 % ophthalmic solution INSTILL ONE DROP INTO EACH EYE ONCE DAILY 7.5 mL 0    leuprolide (LUPRON DEPOT, 6-MONTH,) 45 MG kit Inject 45 mg into the muscle every 6 months      levothyroxine (SYNTHROID/LEVOTHROID) 125 MCG tablet Take 1 tablet (125 mcg) by mouth daily 90 tablet 3    magnesium oxide (MAG-OX) 400 MG tablet Take 1 tablet (400 mg) by mouth daily      amoxicillin (AMOXIL) 500 MG capsule  (Patient not taking: Reported on 12/4/2023)          Objective:   There were no vitals taken for this visit.   Estimated body mass index is 27.45 kg/m  as calculated from the following:    Height as of 3/21/24: 1.829 m (6').    Weight as of 3/21/24: 91.8 kg (202 lb 6.4 oz).    PHYSICAL EXAM:  Alert and oriented.  Setting: Wearing cervical collar.        Recent Labs   Lab Test 03/21/24  0925 12/04/23  1050 06/15/23  0915   GLC  --  103* 100*   PSA <0.01 <0.01 <0.01       Lab Results   Component Value Date    VITDT 30 09/03/2021       Recent Results (from the past 720 hour(s))   PSA, tumor marker    Collection Time: 03/21/24  9:25 AM   Result Value Ref Range    PSA Tumor Marker <0.01 0.00 - 6.50 ng/mL            No data to display              Cognitive Screening   Completely normal to conversational questioning         ROOMING STAFF:    Patient Active Problem List   Diagnosis    Benign Adenomatous Polyp Of The Large Intestine    Osteopenia    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)    Vitamin B12 Deficiency    Vitamin D Deficiency    Lipid disorder    Hypothyroidism due to acquired atrophy of thyroid    Male Erectile Disorder    Neuropathy    Osteoarthritis    Sinus Bradycardia    Urinary incontinence    Glaucoma suspect, left    Collagenous colitis    Malignant neoplasm metastatic to intrapelvic lymph node (H)     Rising PSA following treatment for malignant neoplasm of prostate     Past Medical History:   Diagnosis Date    Gout     Hyperlipidemia     Hypothyroidism     Prostate cancer (H)       Past Surgical History:   Procedure Laterality Date    APPENDECTOMY      CATARACT EXTRACTION      COLONOSCOPY  12/11/2017    HERNIA REPAIR      PROSTATE SURGERY  1992    [facility] APPENDECTOMY      Description: Appendectomy;  Recorded: 03/17/2008;    [facility] REMV PROSTATE,RETROPUB,RAD,LTD NODES      Description: Prostatectomy Retropubic Radical With Lymph Node Biopsy(S);  Recorded: 03/25/2007;      Family History   Problem Relation Age of Onset    Multiple Sclerosis Mother     Heart Disease Father     Aortic aneurysm Father     Cerebrovascular Disease Sister     No Known Problems Sister     No Known Problems Daughter     No Known Problems Daughter       Social History     Socioeconomic History    Marital status:      Spouse name: Not on file    Number of children: 2    Years of education: Not on file    Highest education level: Not on file   Occupational History    Not on file   Tobacco Use    Smoking status: Never    Smokeless tobacco: Never    Tobacco comments:     in college social smoker   Vaping Use    Vaping status: Never Used   Substance and Sexual Activity    Alcohol use: Yes     Alcohol/week: 10.0 - 12.0 standard drinks of alcohol    Drug use: No    Sexual activity: Never   Other Topics Concern    Not on file   Social History Narrative     of [employer] in [place], now retired        Lives with a significant other.  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        Rehabbed a boat on [facility] 2021        AddThis     Social Determinants of Health     Financial Resource Strain: Low Risk  (4/15/2024)    Financial Resource Strain     Within the past 12 months, have you or your family members you live with been unable to get utilities (heat, electricity) when it was really needed?: No   Food Insecurity:  Low Risk  (4/15/2024)    Food Insecurity     Within the past 12 months, did you worry that your food would run out before you got money to buy more?: No     Within the past 12 months, did the food you bought just not last and you didn t have money to get more?: No   Transportation Needs: Low Risk  (4/15/2024)    Transportation Needs     Within the past 12 months, has lack of transportation kept you from medical appointments, getting your medicines, non-medical meetings or appointments, work, or from getting things that you need?: No   Physical Activity: Inactive (4/15/2024)    Exercise Vital Sign     Days of Exercise per Week: 0 days     Minutes of Exercise per Session: 0 min   Stress: No Stress Concern Present (4/15/2024)    [place] [employer] - Occupational Stress Questionnaire     Feeling of Stress : Not at all   Social Connections: Unknown (4/15/2024)    Social Connection and Isolation Panel [NHANES]     Frequency of Communication with Friends and Family: Not on file     Frequency of Social Gatherings with Friends and Family: Twice a week     Attends [employer] Services: Not on file     Active Member of Clubs or Organizations: Not on file     Attends Club or Organization Meetings: Not on file     Marital Status: Not on file   Interpersonal Safety: Not At Risk (5/14/2023)    [facility]    Humiliation, Afraid, Rape, and Kick questionnaire     Fear of Current or Ex-Partner: No     Emotionally Abused: No     Physically Abused: No     Sexually Abused: No   Housing Stability: Low Risk  (4/15/2024)    Housing Stability     Do you have housing? : Yes     Are you worried about losing your housing?: No          Preventive Care Visit  [facility]  Jalil Manjarrez MD, Internal Medicine  Apr 15, 2024      Subjective       4/15/2024    11:08 AM   Additional Questions   Roomed by Familia OLIVER RN         Health Care Directive  Patient does not have a Health  Care Directive or Living Will: Advance Directive received and scanned. Click on Code in the patient header to view.            4/15/2024   General Health   How would you rate your overall physical health? Good   Feel stress (tense, anxious, or unable to sleep) Not at all         4/15/2024   Nutrition   Diet: Regular (no restrictions)         4/15/2024   Exercise   Days per week of moderate/strenous exercise 0 days   Average minutes spent exercising at this level 0 min   (!) EXERCISE CONCERN      4/15/2024   Social Factors   Frequency of gathering with friends or relatives Twice a week   Worry food won't last until get money to buy more No   Food not last or not have enough money for food? No   Do you have housing?  Yes   Are you worried about losing your housing? No   Lack of transportation? No   Unable to get utilities (heat,electricity)? No         4/15/2024   Activities of Daily Living- Home Safety   Needs help with the following daily activites None of the above   Safety concerns in the home No grab bars in the bathroom         4/15/2024   Dental   Dentist two times every year? Yes         4/15/2024   Hearing Screening   Hearing concerns? (!) I NEED TO ASK PEOPLE TO SPEAK UP OR REPEAT THEMSELVES.    (!) IT'S HARDER TO UNDERSTAND WOMEN'S VOICES THAN MEN'S VOICES.    (!) IT'S HARD TO FOLLOW A CONVERSATION IN A NOISY RESTAURANT OR CROWDED ROOM.    (!) TROUBLE UNDESTANDING A SPEAKER IN A PUBLIC MEETING OR Yarsanism SERVICE.    (!) TROUBLE FOLLOWING DIALOGUE IN THE THEATHER.    (!) TROUBLE UNDERSTANDING SOFT OR WHISPERED SPEECH.         4/15/2024   Driving Risk Screening   Patient/family members have concerns about driving No         4/15/2024   General Alertness/Fatigue Screening   Have you been more tired than usual lately? No         4/15/2024   Urinary Incontinence Screening   Bothered by leaking urine in past 6 months Yes         4/15/2024   TB Screening   Were you born outside of the [place]? No       Today's PHQ-9  Score:       4/15/2024    11:11 AM   PHQ-9 SCORE   PHQ-9 Total Score MyChart 2 (Minimal depression)   PHQ-9 Total Score 2         4/15/2024   Substance Use   Alcohol more than 3/day or more than 7/wk No   Do you have a current opioid prescription? No   How severe/bad is pain from 1 to 10? 1/10   Do you use any other substances recreationally? (!) ALCOHOL     Social History     Tobacco Use    Smoking status: Never    Smokeless tobacco: Never    Tobacco comments:     in college social smoker   Vaping Use    Vaping status: Never Used   Substance Use Topics    Alcohol use: Yes     Alcohol/week: 10.0 - 12.0 standard drinks of alcohol    Drug use: No       ASCVD Risk   The 10-year ASCVD risk score (Tisha FAITH, et al., 2019) is: 34.7%    Values used to calculate the score:      Age: 78 years      Sex: Male      Is Non- : No      Diabetic: No      Tobacco smoker: No      Systolic Blood Pressure: 134 mmHg      Is BP treated: No      HDL Cholesterol: 39 mg/dL      Total Cholesterol: 191 mg/dL          Reviewed and updated as needed this visit by Provider     Meds  Problems                 Current providers sharing in care for this patient include:  Patient Care Team:  Jalil Manjarrez MD as PCP - General (Internal Medicine)  Turner Sahni MD as MD (Hematology & Oncology)  Jalil Manjarrez MD as Assigned PCP  Turner Sahni MD as Assigned Cancer Care Provider  Wilda Dahl, RN as Specialty Care Coordinator (Hematology & Oncology)    The following health maintenance items are reviewed in Epic and correct as of today:  Health Maintenance   Topic Date Due    RSV VACCINE (Pregnancy & 60+) (1 - 1-dose 60+ series) Never done    DTAP/TDAP/TD IMMUNIZATION (3 - Td or Tdap) 08/25/2021    LIPID  09/03/2022    TSH W/FREE T4 REFLEX  09/03/2022    PHQ-9  10/15/2024    MEDICARE ANNUAL WELLNESS VISIT  04/15/2025    ANNUAL REVIEW OF HM ORDERS  04/15/2025    FALL RISK ASSESSMENT  04/15/2025     ADVANCE CARE PLANNING  2026    GLUCOSE  2026    HEPATITIS C SCREENING  Completed    DEPRESSION ACTION PLAN  Completed    INFLUENZA VACCINE  Completed    Pneumococcal Vaccine: 65+ Years  Completed    ZOSTER IMMUNIZATION  Completed    COVID-19 Vaccine  Completed    IPV IMMUNIZATION  Aged Out    HPV IMMUNIZATION  Aged Out    MENINGITIS IMMUNIZATION  Aged Out    RSV MONOCLONAL ANTIBODY  Aged Out    COLORECTAL CANCER SCREENING  Discontinued          Vision screenin/15/2024    11:08 AM   Additional Questions   Roomed by Familia OLIVER RN       Video-Visit Details  Type of service:  Video Visit  Patient has given verbal consent to a Video visit?  Yes  How would you like to obtain your AVS?  MyChart  Will anyone else be joining your video visit, giving supplemental history? No    Originating location (pt location): Home    Distant Location (provider location):  Off-site    Video Start Time: 12:44 PM  Video End time:  1:22 PM  Face to face plus orders: 38 minutes  Documentation time:  3 minutes     The visit lasted a total of 41 minutes    Jalil Manjarrez MD  [facility]     Preventive Care Visit        4/15/2024    11:08 AM   Additional Questions   Roomed by Familia OLIVER RN           4/15/2024   General Health   How would you rate your overall physical health? Good   Feel stress (tense, anxious, or unable to sleep) Not at all         4/15/2024   Nutrition   Diet: Regular (no restrictions)         4/15/2024   Exercise   Days per week of moderate/strenous exercise 0 days   Average minutes spent exercising at this level 0 min   (!) EXERCISE CONCERN      4/15/2024   Social Factors   Frequency of gathering with friends or relatives Twice a week   Worry food won't last until get money to buy more No   Food not last or not have enough money for food? No   Do you have housing?  Yes   Are you worried about losing your housing? No   Lack of transportation? No   Unable to get  utilities (heat,electricity)? No         4/15/2024   Fall Risk   Fallen 2 or more times in the past year? No   Trouble with walking or balance? No          4/15/2024   Activities of Daily Living- Home Safety   Needs help with the following daily activites None of the above   Safety concerns in the home No grab bars in the bathroom         4/15/2024   Dental   Dentist two times every year? Yes         4/15/2024   Hearing Screening   Hearing concerns? (!) I NEED TO ASK PEOPLE TO SPEAK UP OR REPEAT THEMSELVES.    (!) IT'S HARDER TO UNDERSTAND WOMEN'S VOICES THAN MEN'S VOICES.    (!) IT'S HARD TO FOLLOW A CONVERSATION IN A NOISY RESTAURANT OR CROWDED ROOM.    (!) TROUBLE UNDESTANDING A SPEAKER IN A PUBLIC MEETING OR Mu-ism SERVICE.    (!) TROUBLE FOLLOWING DIALOGUE IN THE THEATHER.    (!) TROUBLE UNDERSTANDING SOFT OR WHISPERED SPEECH.         4/15/2024   Driving Risk Screening   Patient/family members have concerns about driving No         4/15/2024   General Alertness/Fatigue Screening   Have you been more tired than usual lately? No         4/15/2024   Urinary Incontinence Screening   Bothered by leaking urine in past 6 months Yes         4/15/2024   TB Screening   Were you born outside of the [place]? No       Today's PHQ-9 Score:       4/15/2024    11:11 AM   PHQ-9 SCORE   PHQ-9 Total Score MyChart 2 (Minimal depression)   PHQ-9 Total Score 2         4/15/2024   Substance Use   Alcohol more than 3/day or more than 7/wk No   Do you have a current opioid prescription? No   How severe/bad is pain from 1 to 10? 1/10   Do you use any other substances recreationally? (!) ALCOHOL         ASCVD Risk   The 10-year ASCVD risk score (Tisha FAITH, et al., 2019) is: 34.7%    Values used to calculate the score:      Age: 78 years      Sex: Male      Is Non- : No      Diabetic: No      Tobacco smoker: No      Systolic Blood Pressure: 134 mmHg      Is BP treated: No      HDL Cholesterol: 39 mg/dL       Total Cholesterol: 191 mg/dL

## 2024-04-15 NOTE — TELEPHONE ENCOUNTER
Reason for Call:  Appointment Request    Patient requesting this type of appt:  Preventive     Requested provider: Jalil Manjarrez    Reason patient unable to be scheduled: Not within requested timeframe    When does patient want to be seen/preferred time: 3-7 days    Comments: suppose to of seen him last friday, but got a message back that no appt. Needs it soon as he's having some issues.    Could we send this information to you in [place] or would you prefer to receive a phone call?:   Patient would prefer a phone call   Okay to leave a detailed message?: Yes at Cell number on file:    Telephone Information:   Mobile [phone]       Call taken on 4/15/2024 at 9:56 AM by Kelly Maldonado

## 2024-04-15 NOTE — COMMUNITY RESOURCES LIST (ENGLISH)
April 15, 2024           YOUR PERSONALIZED LIST OF SERVICES & PROGRAMS           & RECREATION    Sports      [facility]  [address] [place] [address] (Distance: 0.3 miles)  Phone: [phone]  Website: https://www.Virtway/fitness/  Language: English      [facility]  [address] [place] [address] (Distance: 0.7 miles)  Phone: [phone]  Website: https://Lobster/seniors/  Language: English, Beninese  Fee: Free, Self pay, Sliding scale  Accessibility: Translation services      [facility]  Phone: [phone]  Website: [email or web address]/adults-seniors/adult-[email or web address]  Language: English  Hours: Mon 7:30 AM - 4:00 PM Tue 7:30 AM - 4:00 PM Wed 7:30 AM - 4:00 PM Thu 7:30 AM - 4:00 PM Fri 7:30 AM - 4:00 PM    Classes/Groups      [facility]  [address] [place] [address] (Distance: 0.3 miles)  Language: English  Fee: Self pay  Accessibility: Ada accessible      [facility]  [address] [place] [address] (Distance: 20.2 miles)  Phone: [phone]  Website: https://www.InfoRemate/  Language: English, Beninese  Fee: Sliding scale, Self pay, Insurance      [facility]  Phone: [phone]  Website: [email or web address]/adults-seniors/adult-[email or web address]  Language: English  Hours: Mon 7:30 AM - 4:00 PM Tue 7:30 AM - 4:00 PM Wed 7:30 AM - 4:00 PM Thu 7:30 AM - 4:00 PM Fri 7:30 AM - 4:00 PM               IMPORTANT NUMBERS & WEBSITES        Emergency Services  911  .   [employer] Way  211 http://211unitedway.org  .   Poison Control  (876) 234-2575 http://[place]poison.org http://wis[place]poison.org  .     Suicide and Crisis Lifeline  988 http://988GoToTagsline.org  .   Childhelp [place] Child Abuse Hotline  224.600.7916 http://Childhelphotline.org   .   National Sexual Assault  Hotline  (354) 478-5116 (HOPE) http://Obatechn.InnSania   .     National Runaway Safeline  (640) 823-1158 (RUNAWAY) http://IonLogix Systems.InnSania  .   Pregnancy & Postpartum Support  Call/text 036-104-1490  [place]: http://ppsupportmn.org  [place]: http://psichapters.com/wi  .   Substance Abuse National Helpline ([facility])  780-492-HELP (5316) http://Findtreatment.gov   .                DISCLAIMER: These resources have been generated via the [employer] Platform. [employer] does not endorse any service providers mentioned in this resource list. [employer] does not guarantee that the services mentioned in this resource list will be available to you or will improve your health or wellness.    [facility]

## 2024-04-15 NOTE — PATIENT INSTRUCTIONS
Refill meds    Update labs    Hold magnesium and zinc for diarrhea    Labs for diarrhea    Course of prednisone for gout reviewed    Check uric acid    Consider higher dose of Escitalopram    Continue Lupron every 6 months noted    Trial of vitamin B12 shot for neuropathy

## 2024-04-17 ENCOUNTER — LAB (OUTPATIENT)
Dept: LAB | Facility: CLINIC | Age: 79
End: 2024-04-17
Payer: COMMERCIAL

## 2024-04-17 DIAGNOSIS — M10.9 ACUTE GOUTY ARTHRITIS: ICD-10-CM

## 2024-04-17 DIAGNOSIS — Z79.899 OTHER LONG TERM (CURRENT) DRUG THERAPY: ICD-10-CM

## 2024-04-17 DIAGNOSIS — G62.9 NEUROPATHY: ICD-10-CM

## 2024-04-17 DIAGNOSIS — E03.4 HYPOTHYROIDISM DUE TO ACQUIRED ATROPHY OF THYROID: ICD-10-CM

## 2024-04-17 DIAGNOSIS — S12.100S CLOSED ODONTOID FRACTURE, SEQUELA: ICD-10-CM

## 2024-04-17 DIAGNOSIS — Z79.83 LONG TERM (CURRENT) USE OF BISPHOSPHONATES: ICD-10-CM

## 2024-04-17 DIAGNOSIS — E78.00 PURE HYPERCHOLESTEROLEMIA: ICD-10-CM

## 2024-04-17 LAB
BASOPHILS # BLD AUTO: 0.1 10E3/UL (ref 0–0.2)
BASOPHILS NFR BLD AUTO: 1 %
EOSINOPHIL # BLD AUTO: 0.5 10E3/UL (ref 0–0.7)
EOSINOPHIL NFR BLD AUTO: 8 %
ERYTHROCYTE [DISTWIDTH] IN BLOOD BY AUTOMATED COUNT: 12.1 % (ref 10–15)
ERYTHROCYTE [SEDIMENTATION RATE] IN BLOOD BY WESTERGREN METHOD: 8 MM/HR (ref 0–20)
HBA1C MFR BLD: 5.2 % (ref 0–5.6)
HCT VFR BLD AUTO: 44.9 % (ref 40–53)
HGB BLD-MCNC: 15.6 G/DL (ref 13.3–17.7)
IMM GRANULOCYTES # BLD: 0 10E3/UL
IMM GRANULOCYTES NFR BLD: 0 %
LYMPHOCYTES # BLD AUTO: 1.2 10E3/UL (ref 0.8–5.3)
LYMPHOCYTES NFR BLD AUTO: 20 %
MCH RBC QN AUTO: 33.5 PG (ref 26.5–33)
MCHC RBC AUTO-ENTMCNC: 34.7 G/DL (ref 31.5–36.5)
MCV RBC AUTO: 96 FL (ref 78–100)
MONOCYTES # BLD AUTO: 0.7 10E3/UL (ref 0–1.3)
MONOCYTES NFR BLD AUTO: 11 %
NEUTROPHILS # BLD AUTO: 3.6 10E3/UL (ref 1.6–8.3)
NEUTROPHILS NFR BLD AUTO: 60 %
PLATELET # BLD AUTO: 209 10E3/UL (ref 150–450)
RBC # BLD AUTO: 4.66 10E6/UL (ref 4.4–5.9)
WBC # BLD AUTO: 6 10E3/UL (ref 4–11)

## 2024-04-17 PROCEDURE — 82607 VITAMIN B-12: CPT

## 2024-04-17 PROCEDURE — 82306 VITAMIN D 25 HYDROXY: CPT

## 2024-04-17 PROCEDURE — 85652 RBC SED RATE AUTOMATED: CPT

## 2024-04-17 PROCEDURE — 84550 ASSAY OF BLOOD/URIC ACID: CPT

## 2024-04-17 PROCEDURE — 80053 COMPREHEN METABOLIC PANEL: CPT

## 2024-04-17 PROCEDURE — 86140 C-REACTIVE PROTEIN: CPT

## 2024-04-17 PROCEDURE — 83036 HEMOGLOBIN GLYCOSYLATED A1C: CPT

## 2024-04-17 PROCEDURE — 85025 COMPLETE CBC W/AUTO DIFF WBC: CPT

## 2024-04-17 PROCEDURE — 36415 COLL VENOUS BLD VENIPUNCTURE: CPT

## 2024-04-17 PROCEDURE — 84443 ASSAY THYROID STIM HORMONE: CPT

## 2024-04-17 PROCEDURE — 80061 LIPID PANEL: CPT

## 2024-04-18 LAB
ALBUMIN SERPL BCG-MCNC: 4.4 G/DL (ref 3.5–5.2)
ALP SERPL-CCNC: 59 U/L (ref 40–150)
ALT SERPL W P-5'-P-CCNC: 24 U/L (ref 0–70)
ANION GAP SERPL CALCULATED.3IONS-SCNC: 14 MMOL/L (ref 7–15)
AST SERPL W P-5'-P-CCNC: 29 U/L (ref 0–45)
BILIRUB SERPL-MCNC: 0.4 MG/DL
BUN SERPL-MCNC: 19 MG/DL (ref 8–23)
CALCIUM SERPL-MCNC: 9.2 MG/DL (ref 8.8–10.2)
CHLORIDE SERPL-SCNC: 105 MMOL/L (ref 98–107)
CHOLEST SERPL-MCNC: 214 MG/DL
CREAT SERPL-MCNC: 0.81 MG/DL (ref 0.67–1.17)
CRP SERPL-MCNC: <3 MG/L
DEPRECATED HCO3 PLAS-SCNC: 22 MMOL/L (ref 22–29)
EGFRCR SERPLBLD CKD-EPI 2021: 90 ML/MIN/1.73M2
FASTING STATUS PATIENT QL REPORTED: YES
GLUCOSE SERPL-MCNC: 108 MG/DL (ref 70–99)
HDLC SERPL-MCNC: 30 MG/DL
LDLC SERPL CALC-MCNC: ABNORMAL MG/DL
NONHDLC SERPL-MCNC: 184 MG/DL
POTASSIUM SERPL-SCNC: 4.6 MMOL/L (ref 3.4–5.3)
PROT SERPL-MCNC: 7.1 G/DL (ref 6.4–8.3)
SODIUM SERPL-SCNC: 141 MMOL/L (ref 135–145)
TRIGL SERPL-MCNC: 449 MG/DL
TSH SERPL DL<=0.005 MIU/L-ACNC: 2.87 UIU/ML (ref 0.3–4.2)
URATE SERPL-MCNC: 7 MG/DL (ref 3.4–7)
VIT B12 SERPL-MCNC: 1094 PG/ML (ref 232–1245)
VIT D+METAB SERPL-MCNC: 40 NG/ML (ref 20–50)

## 2024-04-21 DIAGNOSIS — M10.9 URIC ACID ARTHROPATHY: ICD-10-CM

## 2024-04-21 DIAGNOSIS — M10.9 ACUTE GOUTY ARTHRITIS: Primary | ICD-10-CM

## 2024-04-21 RX ORDER — FEBUXOSTAT 80 MG/1
80 TABLET, FILM COATED ORAL DAILY
Qty: 90 TABLET | Refills: 3 | Status: SHIPPED | OUTPATIENT
Start: 2024-04-21 | End: 2025-04-21

## 2024-06-07 DIAGNOSIS — S12.100S CLOSED ODONTOID FRACTURE, SEQUELA: ICD-10-CM

## 2024-06-07 DIAGNOSIS — M10.9 URIC ACID ARTHROPATHY: ICD-10-CM

## 2024-06-07 DIAGNOSIS — M10.9 ACUTE GOUTY ARTHRITIS: Primary | ICD-10-CM

## 2024-06-07 DIAGNOSIS — K52.831 COLLAGENOUS COLITIS: ICD-10-CM

## 2024-06-07 RX ORDER — BUDESONIDE 3 MG/1
CAPSULE, COATED PELLETS ORAL
Qty: 90 CAPSULE | Refills: 0 | Status: SHIPPED | OUTPATIENT
Start: 2024-06-07 | End: 2024-08-06

## 2024-06-07 RX ORDER — PROBENECID 500 MG/1
500 TABLET, FILM COATED ORAL 2 TIMES DAILY
Qty: 180 TABLET | Refills: 3 | Status: SHIPPED | OUTPATIENT
Start: 2024-06-07 | End: 2025-06-07

## 2024-07-01 DIAGNOSIS — H40.002 GLAUCOMA SUSPECT, LEFT: ICD-10-CM

## 2024-07-01 RX ORDER — LATANOPROST 50 UG/ML
SOLUTION/ DROPS OPHTHALMIC
Qty: 7.5 ML | Refills: 0 | Status: SHIPPED | OUTPATIENT
Start: 2024-07-01

## 2024-07-02 NOTE — PROGRESS NOTES
ASSESSMENT & PLAN       Today we discussed the underlying etiology/pathology of patient's   1. Primary osteoarthritis of right hip    2. Hx of fracture of tibia- right 1978    3. 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)    5. Closed nondisplaced fracture of second cervical vertebra, unspecified fracture morphology, sequela- following at [facility]      -We discussed the patient is dealing with acute anterior hemipelvic pain for the last 2 months without known injury.  Symptoms are isolated to his right groin aggravated by weightbearing activities such as steps as well as hip flexion  - We reviewed his x-rays today of the pelvis which show advancing osteoarthritis of the right hip with 50% loss of the articular cartilage in the superior femoral head/acetabular region with associated acetabular and femoral head/neck junction osteophyte formation.  Leg length cannot be determined by current films.  - We did discuss his previous injury to his right tibia and fibula in 1978 resulting in a slight leg length discrepancy of 3/8 inch.  Patient does wear heel lift periodically to address this and patient will continue as needed with it.  - We discussed the etiology of osteoarthritis as well as various treatment options for the mainstay of management of osteoarthritis including rest, activity modification, proper technique with ambulation of steps/stairs which is to lead with good leg going up and lead with painful/symptomatic leg going down,  maintaining healthy weight management due to the 1:4 rule for joint stress with excess weight, herbal supplementation such as turmeric to decrease inflammation , healthy nutrition, use of oral NSAIDs (if not contraindicated due to patient being on chronic blood thinner medication or other medical conditions) with supplemental Tylenol up to 3000 mg daily, topical  pain relievers such as Voltaren 1% gel to be applied 3 times a day to the area of pain, consideration for intra-articular cortisone injections, viscosupplementation or PRP injections, formal physical therapy for joint mobilization and strengthening and possible surgical consideration if all conservative treatments fail.   -At this time patient is interested in proceeding with prescription NSAID.  Diclofenac sodium prior will be prescribed.  Patient to take 1 tablet every 12 hours with meals for the next 2-3 weeks to help decrease inflammation and pain.  Patient may supplement up to 3000 mg of acetaminophen/Tylenol with prescription medication if needed for additional pain control  - Physical therapy offered for hemipelvic and hip strengthening but currently this is being declined.  Home exercise program attached to after visit summary which he may reference and start as needed.  - Patient will follow-up with us as needed based on response to current treatment plan.    -Call direct clinic number [[phone]] at any time with questions or concerns in regards to your recent office visit with me.     Anderson Salazar PA-C  [facility]    This note was completed in part using a voice recognition software, any grammatical or context distortion are unintentional and inherent to the software.         SUBJECTIVE  Justin Pires is a/an 78 year old male who is seen in consultation at the request of  Jalil Manjarrez M.D. for evaluation of right hip pain. The patient is seen by themselves.    Onset: 2 month(s) ago. Reports insidious onset without acute precipitating event.  Location of Pain: right anterior hip, groin  Rating of Pain at worst: 7/10  Rating of Pain Currently: 7/10  Worsened by: ascending stairs, walking  Better with: ibuprofen  Treatments tried: ibuprofen  Quality: gnawing, deep  Associated symptoms: no distal numbness or tingling; denies swelling or warmth  Orthopedic history:  NO  Relevant surgical history: NO  Social history: retired; former marathoner; likes boating, skiing, gardening    Past Medical History:   Diagnosis Date    Gout     Hyperlipidemia     Hypothyroidism     Prostate cancer (H)      Social History     Socioeconomic History    Marital status:     Number of children: 2   Tobacco Use    Smoking status: Never    Smokeless tobacco: Never    Tobacco comments:     in college social smoker   Vaping Use    Vaping status: Never Used   Substance and Sexual Activity    Alcohol use: Yes     Alcohol/week: 10.0 - 12.0 standard drinks of alcohol    Drug use: No    Sexual activity: Never   Social History Narrative     of [employer] in [place], now retired        Lives with a significant other.  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        Rehabbed a boat on [facility] 2021        [email or web address]     Social Determinants of Health     Financial Resource Strain: Low Risk  (4/15/2024)    Financial Resource Strain     Within the past 12 months, have you or your family members you live with been unable to get utilities (heat, electricity) when it was really needed?: No   Food Insecurity: Low Risk  (4/15/2024)    Food Insecurity     Within the past 12 months, did you worry that your food would run out before you got money to buy more?: No     Within the past 12 months, did the food you bought just not last and you didn t have money to get more?: No   Transportation Needs: Low Risk  (4/15/2024)    Transportation Needs     Within the past 12 months, has lack of transportation kept you from medical appointments, getting your medicines, non-medical meetings or appointments, work, or from getting things that you need?: No   Physical Activity: Inactive (4/15/2024)    Exercise Vital Sign     Days of Exercise per Week: 0 days     Minutes of Exercise per Session: 0 min   Stress: No Stress Concern Present (4/15/2024)    [employer] [place] [employer] -  Occupational Stress Questionnaire     Feeling of Stress : Not at all   Social Connections: Unknown (4/15/2024)    Social Connection and Isolation Panel [NHANES]     Frequency of Social Gatherings with Friends and Family: Twice a week   Interpersonal Safety: Not At Risk (5/14/2023)    [facility]    Humiliation, Afraid, Rape, and Kick questionnaire     Fear of Current or Ex-Partner: No     Emotionally Abused: No     Physically Abused: No     Sexually Abused: No   Housing Stability: Low Risk  (4/15/2024)    Housing Stability     Do you have housing? : Yes     Are you worried about losing your housing?: No         Patient's past medical, surgical, social, and family histories were personally reviewed today and no changes are noted.    REVIEW OF SYSTEMS:  10 point ROS is negative other than symptoms noted above in HPI, Past Medical History or as stated below  Constitutional: NEGATIVE for fever, chills, change in weight  Skin: NEGATIVE for worrisome rashes, moles or lesions  GI/: NEGATIVE for bowel or bladder changes  Neuro: NEGATIVE for weakness, dizziness or paresthesias    OBJECTIVE:  Vital signs as noted in EPIC for 7/2/2024  General: healthy, alert and in no distress  HEENT: no scleral icterus or conjunctival erythema  Skin: no suspicious lesions or rash. No jaundice.  CV: no pedal edema  Resp: normal respiratory effort without conversational dyspnea   Psych: normal mood and affect  Gait: normal steady gait with appropriate coordination and balance  Neuro: Normal light sensory exam of lower extremity      MSK:  Exam shows a pleasant 78-year-old male who ablates weightbearing without assistive device.  Decreased C-spine range of motion consistent with history of cervical C2 vertebrae fracture currently being managed at [facility].  Patient is alert and oriented x 3 with normal range of motion of the upper and lower extremities with no evidence of neurovascular deficit.  Patient is alert  and orientated x 3 and answers questions appropriately and provides great history.  Examination patient's right hemipelvis shows reproduction of right anterior groin pain with terminal internal rotation of the right hip aggravated by deep hip flexion and internal rotation/impingement testing.  Patient has mild discomfort with resisted hip flexion from a seated position on the right side compared to the left.  The left hip is full range of motion and is asymptomatic.  Patient still able to cross his legs right over left and left over right without symptoms.  Patient is neurovascular intact L2-S1 bilaterally.  No pain throughout the hemipelvis including over the trochanter laterally.            Personal Independent visualization of the below images done today:  2 view x-ray of the patient's pelvis and right hip are personally reviewed and reviewed with patient today showing progressive osteoarthritic change of moderate-caliber on the right hip with associated osteophyte formation of the superior lateral acetabulum as well as the femoral head and neck junction.  Degenerative changes noted of the pubic symphysis.  Metal artifact noted throughout the pelvis.  No evidence of fracture or dislocation or evidence of lytic lesion within the bones.    Patient's conditions were thoroughly discussed during today's visit with total time reviewing patient's previous medical records/history/radiology, face-to-face examination and discussion and plan of care with the patient and documentation being 30 minutes for today's clinical visit  Anderson Salazar PA-C  [facility]    This note was completed in part using a voice recognition software, any grammatical or context distortion are unintentional and inherent to the software.

## 2024-07-09 ENCOUNTER — ANCILLARY PROCEDURE (OUTPATIENT)
Dept: GENERAL RADIOLOGY | Facility: CLINIC | Age: 79
End: 2024-07-09
Attending: PHYSICIAN ASSISTANT
Payer: COMMERCIAL

## 2024-07-09 ENCOUNTER — OFFICE VISIT (OUTPATIENT)
Dept: ORTHOPEDICS | Facility: CLINIC | Age: 79
End: 2024-07-09
Attending: INTERNAL MEDICINE
Payer: COMMERCIAL

## 2024-07-09 VITALS
BODY MASS INDEX: 26.49 KG/M2 | WEIGHT: 195.6 LBS | HEIGHT: 72 IN | DIASTOLIC BLOOD PRESSURE: 68 MMHG | SYSTOLIC BLOOD PRESSURE: 120 MMHG

## 2024-07-09 DIAGNOSIS — M16.11 PRIMARY OSTEOARTHRITIS OF RIGHT HIP: Primary | ICD-10-CM

## 2024-07-09 DIAGNOSIS — M21.70 ACQUIRED INEQUALITY OF LENGTH OF EXTREMITY: ICD-10-CM

## 2024-07-09 DIAGNOSIS — M25.551 HIP PAIN, RIGHT: ICD-10-CM

## 2024-07-09 DIAGNOSIS — Z87.81 HX OF FRACTURE OF TIBIA: ICD-10-CM

## 2024-07-09 DIAGNOSIS — C61 PROSTATE CANCER METASTATIC TO INTRAPELVIC LYMPH NODE (H): ICD-10-CM

## 2024-07-09 DIAGNOSIS — C77.5 PROSTATE CANCER METASTATIC TO INTRAPELVIC LYMPH NODE (H): ICD-10-CM

## 2024-07-09 DIAGNOSIS — S12.101S CLOSED NONDISPLACED FRACTURE OF SECOND CERVICAL VERTEBRA, UNSPECIFIED FRACTURE MORPHOLOGY, SEQUELA: ICD-10-CM

## 2024-07-09 PROCEDURE — 99203 OFFICE O/P NEW LOW 30 MIN: CPT | Performed by: PHYSICIAN ASSISTANT

## 2024-07-09 PROCEDURE — 73501 X-RAY EXAM HIP UNI 1 VIEW: CPT | Mod: TC | Performed by: RADIOLOGY

## 2024-07-09 ASSESSMENT — PAIN SCALES - GENERAL: PAINLEVEL: SEVERE PAIN (7)

## 2024-07-09 NOTE — PATIENT INSTRUCTIONS
Today we discussed the underlying etiology/pathology of patient's   1. Primary osteoarthritis of right hip    2. Hx of fracture of tibia- right 1978    3. 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)    5. Closed nondisplaced fracture of second cervical vertebra, unspecified fracture morphology, sequela- following at [facility]      -We discussed the patient is dealing with acute anterior hemipelvic pain for the last 2 months without known injury.  Symptoms are isolated to his right groin aggravated by weightbearing activities such as steps as well as hip flexion  - We reviewed his x-rays today of the pelvis which show advancing osteoarthritis of the right hip with 50% loss of the articular cartilage in the superior femoral head/acetabular region with associated acetabular and femoral head/neck junction osteophyte formation.  Leg length cannot be determined by current films.  - We did discuss his previous injury to his right tibia and fibula in 1978 resulting in a slight leg length discrepancy of 3/8 inch.  Patient does wear heel lift periodically to address this and patient will continue as needed with it.  - We discussed the etiology of osteoarthritis as well as various treatment options for the mainstay of management of osteoarthritis including rest, activity modification, proper technique with ambulation of steps/stairs which is to lead with good leg going up and lead with painful/symptomatic leg going down,  maintaining healthy weight management due to the 1:4 rule for joint stress with excess weight, herbal supplementation such as turmeric to decrease inflammation , healthy nutrition, use of oral NSAIDs (if not contraindicated due to patient being on chronic blood thinner medication or other medical conditions) with supplemental Tylenol up to 3000 mg daily, topical pain relievers such  as Voltaren 1% gel to be applied 3 times a day to the area of pain, consideration for intra-articular cortisone injections, viscosupplementation or PRP injections, formal physical therapy for joint mobilization and strengthening and possible surgical consideration if all conservative treatments fail.   -At this time patient is interested in proceeding with prescription NSAID.  Diclofenac sodium prior will be prescribed.  Patient to take 1 tablet every 12 hours with meals for the next 2-3 weeks to help decrease inflammation and pain.  Patient may supplement up to 3000 mg of acetaminophen/Tylenol with prescription medication if needed for additional pain control  - Physical therapy offered for hemipelvic and hip strengthening but currently this is being declined.  Home exercise program attached to after visit summary which he may reference and start as needed.  - Patient will follow-up with us as needed based on response to current treatment plan.    -Call direct clinic number [[phone]] at any time with questions or concerns in regards to your recent office visit with me.     Anderson Slaazar PA-C  [facility]    This note was completed in part using a voice recognition software, any grammatical or context distortion are unintentional and inherent to the software.

## 2024-07-09 NOTE — LETTER
7/9/2024      Justin Pires  [address] [place] [address]      Dear Colleague,    Thank you for referring your patient, Justin Pires, to the [facility]. Please see a copy of my visit note below.    ASSESSMENT & PLAN       Today we discussed the underlying etiology/pathology of patient's   1. Primary osteoarthritis of right hip    2. Hx of fracture of tibia- right 1978    3. 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)    5. Closed nondisplaced fracture of second cervical vertebra, unspecified fracture morphology, sequela- following at [facility]      -We discussed the patient is dealing with acute anterior hemipelvic pain for the last 2 months without known injury.  Symptoms are isolated to his right groin aggravated by weightbearing activities such as steps as well as hip flexion  - We reviewed his x-rays today of the pelvis which show advancing osteoarthritis of the right hip with 50% loss of the articular cartilage in the superior femoral head/acetabular region with associated acetabular and femoral head/neck junction osteophyte formation.  Leg length cannot be determined by current films.  - We did discuss his previous injury to his right tibia and fibula in 1978 resulting in a slight leg length discrepancy of 3/8 inch.  Patient does wear heel lift periodically to address this and patient will continue as needed with it.  - We discussed the etiology of osteoarthritis as well as various treatment options for the mainstay of management of osteoarthritis including rest, activity modification, proper technique with ambulation of steps/stairs which is to lead with good leg going up and lead with painful/symptomatic leg going down,  maintaining healthy weight management due to the 1:4 rule for joint stress with excess weight, herbal  supplementation such as turmeric to decrease inflammation , healthy nutrition, use of oral NSAIDs (if not contraindicated due to patient being on chronic blood thinner medication or other medical conditions) with supplemental Tylenol up to 3000 mg daily, topical pain relievers such as Voltaren 1% gel to be applied 3 times a day to the area of pain, consideration for intra-articular cortisone injections, viscosupplementation or PRP injections, formal physical therapy for joint mobilization and strengthening and possible surgical consideration if all conservative treatments fail.   -At this time patient is interested in proceeding with prescription NSAID.  Diclofenac sodium prior will be prescribed.  Patient to take 1 tablet every 12 hours with meals for the next 2-3 weeks to help decrease inflammation and pain.  Patient may supplement up to 3000 mg of acetaminophen/Tylenol with prescription medication if needed for additional pain control  - Physical therapy offered for hemipelvic and hip strengthening but currently this is being declined.  Home exercise program attached to after visit summary which he may reference and start as needed.  - Patient will follow-up with us as needed based on response to current treatment plan.    -Call direct clinic number [[phone]] at any time with questions or concerns in regards to your recent office visit with me.     Anderson Salaazr PA-C  [facility]    This note was completed in part using a voice recognition software, any grammatical or context distortion are unintentional and inherent to the software.         SUBJECTIVE  Justin Pires is a/an 78 year old male who is seen in consultation at the request of  Jalil Manjarrez M.D. for evaluation of right hip pain. The patient is seen by themselves.    Onset: 2 month(s) ago. Reports insidious onset without acute precipitating event.  Location of Pain: right anterior hip, groin  Rating of Pain at worst:  7/10  Rating of Pain Currently: 7/10  Worsened by: ascending stairs, walking  Better with: ibuprofen  Treatments tried: ibuprofen  Quality: gnawing, deep  Associated symptoms: no distal numbness or tingling; denies swelling or warmth  Orthopedic history: NO  Relevant surgical history: NO  Social history: retired; former marathoner; likes boating, skiing, gardening    Past Medical History:   Diagnosis Date     Gout      Hyperlipidemia      Hypothyroidism      Prostate cancer (H)      Social History     Socioeconomic History     Marital status:      Number of children: 2   Tobacco Use     Smoking status: Never     Smokeless tobacco: Never     Tobacco comments:     in college social smoker   Vaping Use     Vaping status: Never Used   Substance and Sexual Activity     Alcohol use: Yes     Alcohol/week: 10.0 - 12.0 standard drinks of alcohol     Drug use: No     Sexual activity: Never   Social History Narrative     of [employer] in [place], now retired        Lives with a significant other.  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        Rehabbed a boat on [facility] 2021        [employer]     Social Determinants of Health     Financial Resource Strain: Low Risk  (4/15/2024)    Financial Resource Strain      Within the past 12 months, have you or your family members you live with been unable to get utilities (heat, electricity) when it was really needed?: No   Food Insecurity: Low Risk  (4/15/2024)    Food Insecurity      Within the past 12 months, did you worry that your food would run out before you got money to buy more?: No      Within the past 12 months, did the food you bought just not last and you didn t have money to get more?: No   Transportation Needs: Low Risk  (4/15/2024)    Transportation Needs      Within the past 12 months, has lack of transportation kept you from medical appointments, getting your medicines, non-medical meetings or appointments, work, or from getting  things that you need?: No   Physical Activity: Inactive (4/15/2024)    Exercise Vital Sign      Days of Exercise per Week: 0 days      Minutes of Exercise per Session: 0 min   Stress: No Stress Concern Present (4/15/2024)    [employer] [place] [employer] - Occupational Stress Questionnaire      Feeling of Stress : Not at all   Social Connections: Unknown (4/15/2024)    Social Connection and Isolation Panel [NHANES]      Frequency of Social Gatherings with Friends and Family: Twice a week   Interpersonal Safety: Not At Risk (5/14/2023)    [facility], [facility]    Humiliation, Afraid, Rape, and Kick questionnaire      Fear of Current or Ex-Partner: No      Emotionally Abused: No      Physically Abused: No      Sexually Abused: No   Housing Stability: Low Risk  (4/15/2024)    Housing Stability      Do you have housing? : Yes      Are you worried about losing your housing?: No         Patient's past medical, surgical, social, and family histories were personally reviewed today and no changes are noted.    REVIEW OF SYSTEMS:  10 point ROS is negative other than symptoms noted above in HPI, Past Medical History or as stated below  Constitutional: NEGATIVE for fever, chills, change in weight  Skin: NEGATIVE for worrisome rashes, moles or lesions  GI/: NEGATIVE for bowel or bladder changes  Neuro: NEGATIVE for weakness, dizziness or paresthesias    OBJECTIVE:  Vital signs as noted in EPIC for 7/2/2024  General: healthy, alert and in no distress  HEENT: no scleral icterus or conjunctival erythema  Skin: no suspicious lesions or rash. No jaundice.  CV: no pedal edema  Resp: normal respiratory effort without conversational dyspnea   Psych: normal mood and affect  Gait: normal steady gait with appropriate coordination and balance  Neuro: Normal light sensory exam of lower extremity      MSK:  Exam shows a pleasant 78-year-old male who ablates weightbearing without assistive device.  Decreased  C-spine range of motion consistent with history of cervical C2 vertebrae fracture currently being managed at [facility].  Patient is alert and oriented x 3 with normal range of motion of the upper and lower extremities with no evidence of neurovascular deficit.  Patient is alert and orientated x 3 and answers questions appropriately and provides great history.  Examination patient's right hemipelvis shows reproduction of right anterior groin pain with terminal internal rotation of the right hip aggravated by deep hip flexion and internal rotation/impingement testing.  Patient has mild discomfort with resisted hip flexion from a seated position on the right side compared to the left.  The left hip is full range of motion and is asymptomatic.  Patient still able to cross his legs right over left and left over right without symptoms.  Patient is neurovascular intact L2-S1 bilaterally.  No pain throughout the hemipelvis including over the trochanter laterally.            Personal Independent visualization of the below images done today:  2 view x-ray of the patient's pelvis and right hip are personally reviewed and reviewed with patient today showing progressive osteoarthritic change of moderate-caliber on the right hip with associated osteophyte formation of the superior lateral acetabulum as well as the femoral head and neck junction.  Degenerative changes noted of the pubic symphysis.  Metal artifact noted throughout the pelvis.  No evidence of fracture or dislocation or evidence of lytic lesion within the bones.    Patient's conditions were thoroughly discussed during today's visit with total time reviewing patient's previous medical records/history/radiology, face-to-face examination and discussion and plan of care with the patient and documentation being 30 minutes for today's clinical visit  Anderson Salazar PA-C  [facility]    This note was completed in part using a voice recognition software,  any grammatical or context distortion are unintentional and inherent to the software.       Again, thank you for allowing me to participate in the care of your patient.        Sincerely,        Anderson Salazar PA-C

## 2024-07-10 ENCOUNTER — MYC MEDICAL ADVICE (OUTPATIENT)
Dept: ORTHOPEDICS | Facility: CLINIC | Age: 79
End: 2024-07-10
Payer: COMMERCIAL

## 2024-07-10 NOTE — TELEPHONE ENCOUNTER
Please see patient MyC message and advise if there are any recommendations for additional dietary supplements for patient.     Duyen Moore, ATC

## 2024-08-01 ENCOUNTER — MYC MEDICAL ADVICE (OUTPATIENT)
Dept: ORTHOPEDICS | Facility: CLINIC | Age: 79
End: 2024-08-01
Payer: COMMERCIAL

## 2024-08-01 NOTE — CONFIDENTIAL NOTE
Please advise patient that if patient is having no hip pain currently I would recommend patient not continue with this medication unless pain returns as there are increased risks of GI issues, bleeding ulcers, kidney injury and increased risk of cardiovascular events (heart attack or stoke).  Patient to contact me IF hip pain returns and we can discuss resuming medications if needed.  Anderson Salazar PA-C

## 2024-08-01 NOTE — TELEPHONE ENCOUNTER
Patient last seen 7/9/24 and diclofenac sodium 50mg was prescribed at that time. Patient is requesting refill. Please advise if agreeable to refilling or if follow up appointment is needed. Pharmacy selected in [facility].    Brigida Blackmon MSA, ATC  Certified Athletic Trainer

## 2024-08-21 DIAGNOSIS — M16.11 PRIMARY OSTEOARTHRITIS OF RIGHT HIP: ICD-10-CM

## 2024-08-28 DIAGNOSIS — M16.11 PRIMARY OSTEOARTHRITIS OF RIGHT HIP: ICD-10-CM

## 2024-08-28 NOTE — TELEPHONE ENCOUNTER
Please see this encounter for refill request, and the encounter on 8/1/2024 for an advise if appropriate to have this filled for the patient to have on as needed basis.  Refill request populated on 8/16/2024.    Ahmet Orozco ATC

## 2024-08-29 NOTE — TELEPHONE ENCOUNTER
2nd voicemail left for patient to return call to discuss request.     Brigida Blackmon MSA, ATC  Certified Athletic Trainer

## 2024-08-29 NOTE — TELEPHONE ENCOUNTER
No consent to communicate on file. Left voicemail for patient to return call.     If patient returns call, staff to notify him of refill request with dosing change by provider.     Brigida Blackmon, ATC

## 2024-08-30 DIAGNOSIS — E78.2 MIXED HYPERLIPIDEMIA: ICD-10-CM

## 2024-08-30 DIAGNOSIS — K52.831 COLLAGENOUS COLITIS: Primary | ICD-10-CM

## 2024-09-19 ENCOUNTER — ONCOLOGY VISIT (OUTPATIENT)
Dept: ONCOLOGY | Facility: HOSPITAL | Age: 79
End: 2024-09-19
Attending: INTERNAL MEDICINE
Payer: COMMERCIAL

## 2024-09-19 ENCOUNTER — PATIENT OUTREACH (OUTPATIENT)
Dept: ONCOLOGY | Facility: HOSPITAL | Age: 79
End: 2024-09-19

## 2024-09-19 ENCOUNTER — LAB (OUTPATIENT)
Dept: INFUSION THERAPY | Facility: HOSPITAL | Age: 79
End: 2024-09-19
Attending: INTERNAL MEDICINE
Payer: COMMERCIAL

## 2024-09-19 VITALS
SYSTOLIC BLOOD PRESSURE: 137 MMHG | BODY MASS INDEX: 27.41 KG/M2 | TEMPERATURE: 97.9 F | OXYGEN SATURATION: 95 % | RESPIRATION RATE: 18 BRPM | WEIGHT: 195.8 LBS | HEART RATE: 81 BPM | DIASTOLIC BLOOD PRESSURE: 85 MMHG | HEIGHT: 71 IN

## 2024-09-19 DIAGNOSIS — C61 PROSTATE CANCER METASTATIC TO INTRAPELVIC LYMPH NODE (H): Primary | ICD-10-CM

## 2024-09-19 DIAGNOSIS — C61 PROSTATE CANCER METASTATIC TO INTRAPELVIC LYMPH NODE (H): ICD-10-CM

## 2024-09-19 DIAGNOSIS — C77.5 PROSTATE CANCER METASTATIC TO INTRAPELVIC LYMPH NODE (H): Primary | ICD-10-CM

## 2024-09-19 DIAGNOSIS — Z79.83 LONG TERM (CURRENT) USE OF BISPHOSPHONATES: ICD-10-CM

## 2024-09-19 DIAGNOSIS — C77.5 PROSTATE CANCER METASTATIC TO INTRAPELVIC LYMPH NODE (H): ICD-10-CM

## 2024-09-19 LAB — PSA SERPL DL<=0.01 NG/ML-MCNC: <0.01 NG/ML (ref 0–6.5)

## 2024-09-19 PROCEDURE — G2211 COMPLEX E/M VISIT ADD ON: HCPCS | Performed by: INTERNAL MEDICINE

## 2024-09-19 PROCEDURE — 99214 OFFICE O/P EST MOD 30 MIN: CPT | Performed by: INTERNAL MEDICINE

## 2024-09-19 PROCEDURE — 36415 COLL VENOUS BLD VENIPUNCTURE: CPT

## 2024-09-19 PROCEDURE — G0463 HOSPITAL OUTPT CLINIC VISIT: HCPCS | Performed by: INTERNAL MEDICINE

## 2024-09-19 PROCEDURE — 84153 ASSAY OF PSA TOTAL: CPT

## 2024-09-19 ASSESSMENT — PAIN SCALES - GENERAL: PAINLEVEL: NO PAIN (0)

## 2024-09-19 NOTE — PROGRESS NOTES
"Oncology Rooming Note    September 19, 2024 1:22 PM   Justin Pires is a 78 year old male who presents for:    Chief Complaint   Patient presents with    Oncology Clinic Visit     Prostate cancer metastatic to intrapelvic lymph node      Initial Vitals: /85   Pulse 81   Temp 97.9  F (36.6  C)   Resp 18   Ht 1.803 m (5' 11\")   Wt 88.8 kg (195 lb 12.8 oz)   SpO2 95%   BMI 27.31 kg/m   Estimated body mass index is 27.31 kg/m  as calculated from the following:    Height as of this encounter: 1.803 m (5' 11\").    Weight as of this encounter: 88.8 kg (195 lb 12.8 oz). Body surface area is 2.11 meters squared.  No Pain (0) Comment: Data Unavailable   No LMP for male patient.  Allergies reviewed: Yes  Medications reviewed: Yes    Medications: Medication refills not needed today.  Pharmacy name entered into [facility]: [id number] [place], [place] - [address]    Frailty Screening:   Is the patient here for a new oncology consult visit in cancer care? 2. No      Clinical concerns: None      Liana Rick LPN             "

## 2024-09-19 NOTE — LETTER
"9/19/2024      Justin Pires  [address] [place] [address]      Dear Colleague,    Thank you for referring your patient, Justin Pires, to the [facility]. Please see a copy of my visit note below.    Oncology Rooming Note    September 19, 2024 1:22 PM   Justin Pires is a 78 year old male who presents for:    Chief Complaint   Patient presents with     Oncology Clinic Visit     Prostate cancer metastatic to intrapelvic lymph node      Initial Vitals: /85   Pulse 81   Temp 97.9  F (36.6  C)   Resp 18   Ht 1.803 m (5' 11\")   Wt 88.8 kg (195 lb 12.8 oz)   SpO2 95%   BMI 27.31 kg/m   Estimated body mass index is 27.31 kg/m  as calculated from the following:    Height as of this encounter: 1.803 m (5' 11\").    Weight as of this encounter: 88.8 kg (195 lb 12.8 oz). Body surface area is 2.11 meters squared.  No Pain (0) Comment: Data Unavailable   No LMP for male patient.  Allergies reviewed: Yes  Medications reviewed: Yes    Medications: Medication refills not needed today.  Pharmacy name entered into [facility]: [id number] - [place], [place] - [address]    Frailty Screening:   Is the patient here for a new oncology consult visit in cancer care? 2. No      Clinical concerns: None      Liana Rick LPN               [facility] Hematology and Oncology Progress Note    Patient: Justin Pires  MRN: [id number]  Date of Service: Sep 19, 2024       Reason for visit         Problem List Items Addressed This Visit          Immune    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) - Primary    Relevant Orders    Infusion Appointment Request - Adult         Assessment      1.  Metastatic prostate cancer with involvement of the axillary lymph node, bones and intra-abdominal lymph nodes. He responded very well to treatment with Taxotere chemotherapy followed by hormonal therapy. Was off hormonal therapy from I believe April 2018 until May 2021.  After " starting hormonal therapy PSA is undetectable.  Choline PET scan in April 2022 still showed low-level activity in the iliac lymph nodes.  Question of small uptake in the thoracic vertebral body which was felt to be not clearly metastatic on the MRI which was done subsequently.  Currently treatment with with Anti-androgen therapy with Lupron alone. Most recent C-14 PET scan shows no active disease except from a small area of urethrovesical junction which appears to have some activity. PSA is undetectable when tested in May 2023.  Last Lupron shot was in June 2023 which was a 6-month shot.  Took a 3-month break in order to enjoy some skiing. Then resumed it in early 2024.  2.  Was given recommendation to consider salvage radiation therapy to treat pelvic lymph nodes.  However he has not done that.  He is doing quite well in spite of that.  PSA is pending from today.  3.  Hormone deprivation side effects.  Justin is due for his Eligard.  4.  Musculoskeletal stiffness in his hands and hip joints.   5.  History of mishap on ski slopes the first week of March 2024.  Fell forward and had neck injury.  Turns out he had nondisplaced fracture of the odontoid.  Was treated at [facility] and given the option of surgical intervention versus neck collar and conservative management.  Justin chose conservative management.  Seems to be doing okay although there is a non union of the fractured segments.    Plan      At this time recommend treatment with Eligard 45 mg once every 6 months.  Follow-up on the PSA  Recheck bone density.  Justin is on alendronate therapy that he has been for the past few years.  Diet rich in calcium and vitamin D.  Careful with some extreme physical activity due to the odontoid fracture.  Follows up with [facility] for that.  The longitudinal plan of care for the diagnosis(es)/condition(s) as documented were addressed during this visit. Due to the added complexity in care, I will continue to support Justin in  the subsequent management and with ongoing continuity of care.     Medical decision making      Moderately complex.  Presenting for treatment and management of the moderately severe side effects of prostate cancer treatment reviewed notes from each unique source.  Reviewed each unique test.    Ordered tests.    Independently interpreted lab tests and radiological exams performed by other physicians.  Personally reviewed the images of the pelvic x-rays.      Risk      Significant risk of morbidity mortality.  Significant risk of side effects from intervention.       Cancer Staging   No matching staging information was found for the patient.        History of present illness        Mr. Justin Pires male with a history of elevated PSA. He is actually prior history of prostate cancer diagnosed in 1992 when he presented to [facility] for a executive physical and was found to have elevated PSA. He was completely is symptomatically that point. His workup then revealed that he indeed had prostate cancer. At that point at 47 years of age he underwent retropubic prostatectomy which revealed prostate cancer involving the left lobe of the prostate grade 2 out of 3 with negative margins and negative lymph nodes. He had been in remission ever since. In January 2016 he had a PSA checked on which actually came back at 10.7. It was thought at the time that he might have prostatitis. He was given some antibiotics. He then went down to [place]. There he had his PSA checked and was down to 6. He came back in April 2016 and had a repeat PSA checked which was elevated again at 15.7. Therefore Dr. Manjarrez asked him to come and see me.     He is also been seen by his urologist Dr. Fortunato Collazo. He had MRI CT scan and bone scan done. None of them revealed any lesion.     He had a ProstaScint scan which actually showed some uptake on the right prostatic fossa.  I sent him to Dr. Browne's for another ultrasound-guided biopsy of his right  prostate bed.  The biopsy was negative for any detectable prostate cancer.     He then was seen at the [facility] and got a C11 PET-choline image that showed metastatic disease with bone mets and lymph node mets. He had an axillary lymph node biopsied that was consistent with prostate cancer. He has been started on Lupron in June 2016 and in July 26th 2016 started on Taxotere. Had his first cycle end of July 2016. Tolerated it with expected side effects. His Psa basically became undetectable very quickly. He has finished 6 cycles. Last one in November 2016.     Had C11 PET scan at [facility] that showed near complete response.  Eventually stopped hormonal therapy sometime in spring 2018.  He had been followed by PSAs and C 11 choline PET scan.  His PSA started to rise in January 2021 it was 0.19.  His CA 11 choline PET scan was negative.     In April 2021 his PSA was 0.71.  His choline PET scan revealed several low-level tiny pelvic lymph nodes in the external and common iliac lymph node chains.  He started his hormonal therapy.  His testosterone level checked in 4/16/2021 showed normal testosterone level.     PSA checked on 10/5/2021 showed less than 0.1. His PET scan done on 10/4/2021 at [facility] still showed some persistent uptake in the iliac lymph nodes. He was recommended to consider radiation therapy to those lymph nodes. He is here to discuss that option.    However in October 2021 I recommended against radiation therapy.  Justin is happy with the recommendation.  Continued on Lupron therapy.  The PSA in the meantime has stayed undetectable.    Justin had another choline PET scan done at [facility] in April 2022.  It showed persistent uptake in the iliac lymph nodes.  However not a very high level of uptake.  There was a question of an uptake in the thoracic vertebral body.  The thoracic vertebral body was further evaluated with the help of an MRI which suggested that this perhaps could be inflammatory from a  "humeral node rather than a metastatic lesion.    Justin had consult with Dr. Richard Rey radiation oncologist at [facility].  Justin was given option of pelvic radiation up to 64 cGy including the prostate bed versus combination of Lupron with abiraterone.  I recommended against radiation and proceeding with Anti androgen therapy with Lupron alone.  Justin has been doing exceedingly well.  PSA continues to be undetectable.  Choline PET scan done in August 2022 showed improvement in all the areas of the disease that was visible before.  No new areas.    He has continued on Eligard every 6 months.  He had a PET scan (choline PET) done at [facility] on May 2023.  On that PET scan there was a new faint choline uptake near the left aspect of the vesicourethral anastomosis was noted.  Stable mild choline uptake in the small left common iliac lymph node was noted.  No suspicious bone metastases were noted.    Last Lupron shot was given in June 2023.  After that in December 2023 we decided to give him a break.  He was trying to enjoy some skiing.  He did end up going to [place] and ski did however met with an accident and injured his neck.  Very likely that it was not a more severe injury.  Was noted to have a nondisplaced odontoid fracture.  He is currently on a neck collar.  Rigidly immobilized.  Some of the androgen deprivation side effects did lakhwinder after few weeks of not taking Lupron.    Review of system      Details noted in the history of present illness.  A detailed review of systems is otherwise negative.      Physical exam        /85   Pulse 81   Temp 97.9  F (36.6  C)   Resp 18   Ht 1.803 m (5' 11\")   Wt 88.8 kg (195 lb 12.8 oz)   SpO2 95%   BMI 27.31 kg/m      GENERAL: No acute distress. Cooperative in conversation.   HEENT:  Pupils are equal, round and reactive. Oral mucosa is clean and intact. No ulcerations or mucositis noted. No bleeding noted.  RESP:Chest symmetric lungs " are clear bilaterally per auscultation. Regular respiratory rate. No wheezes or rhonchi.  CV: Normal S1 S2 Regular, rate and rhythm.     ABD: Nondistended, soft, nontender. Positive bowel sounds. No organomegaly.   EXTREMITIES: No lower extremity edema.   NEURO: Non- focal. Alert and oriented x3.  Cranial nerves appear intact.  PSYCH: Within normal limits. No depression or anxiety.  SKIN: Warm dry intact.      Lab results Reviewed      No results found for this or any previous visit (from the past 168 hour(s)).    Imaging results Reviewed        No results found.    Total time spent was 35 minutes, more than half of it was in face-to-face counseling regarding disease state, treatment, side effects, documentation, review of clinical data and coordination of care     Signed by: Turner Sahni MD      This note has been dictated using voice recognition software. Any grammatical or context distortions are unintentional and inherent to the software       Again, thank you for allowing me to participate in the care of your patient.        Sincerely,        Turner Sahni MD

## 2024-09-19 NOTE — PROGRESS NOTES
[facility] Hematology and Oncology Progress Note    Patient: Justin Pires  MRN: [id number]  Date of Service: Sep 19, 2024       Reason for visit         Problem List Items Addressed This Visit          Immune    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) - Primary    Relevant Orders    Infusion Appointment Request - Adult         Assessment      1.  Metastatic prostate cancer with involvement of the axillary lymph node, bones and intra-abdominal lymph nodes. He responded very well to treatment with Taxotere chemotherapy followed by hormonal therapy. Was off hormonal therapy from I believe April 2018 until May 2021.  After starting hormonal therapy PSA is undetectable.  Choline PET scan in April 2022 still showed low-level activity in the iliac lymph nodes.  Question of small uptake in the thoracic vertebral body which was felt to be not clearly metastatic on the MRI which was done subsequently.  Currently treatment with with Anti-androgen therapy with Lupron alone. Most recent C-14 PET scan shows no active disease except from a small area of urethrovesical junction which appears to have some activity. PSA is undetectable when tested in May 2023.  Last Lupron shot was in June 2023 which was a 6-month shot.  Took a 3-month break in order to enjoy some skiing. Then resumed it in early 2024.  2.  Was given recommendation to consider salvage radiation therapy to treat pelvic lymph nodes.  However he has not done that.  He is doing quite well in spite of that.  PSA is pending from today.  3.  Hormone deprivation side effects.  Justin is due for his Eligard.  4.  Musculoskeletal stiffness in his hands and hip joints.   5.  History of mishap on ski slopes the first week of March 2024.  Fell forward and had neck injury.  Turns out he had nondisplaced fracture of the odontoid.  Was treated at [facility] and given the option of surgical intervention versus neck collar and conservative management.  Justin  chose conservative management.  Seems to be doing okay although there is a non union of the fractured segments.    Plan      At this time recommend treatment with Eligard 45 mg once every 6 months.  Follow-up on the PSA  Recheck bone density.  Justin is on alendronate therapy that he has been for the past few years.  Diet rich in calcium and vitamin D.  Careful with some extreme physical activity due to the odontoid fracture.  Follows up with [facility] for that.  The longitudinal plan of care for the diagnosis(es)/condition(s) as documented were addressed during this visit. Due to the added complexity in care, I will continue to support Justin in the subsequent management and with ongoing continuity of care.     Medical decision making      Moderately complex.  Presenting for treatment and management of the moderately severe side effects of prostate cancer treatment reviewed notes from each unique source.  Reviewed each unique test.    Ordered tests.    Independently interpreted lab tests and radiological exams performed by other physicians.  Personally reviewed the images of the pelvic x-rays.      Risk      Significant risk of morbidity mortality.  Significant risk of side effects from intervention.       Cancer Staging   No matching staging information was found for the patient.        History of present illness        Mr. Justin Pires male with a history of elevated PSA. He is actually prior history of prostate cancer diagnosed in 1992 when he presented to [facility] for a executive physical and was found to have elevated PSA. He was completely is symptomatically that point. His workup then revealed that he indeed had prostate cancer. At that point at 47 years of age he underwent retropubic prostatectomy which revealed prostate cancer involving the left lobe of the prostate grade 2 out of 3 with negative margins and negative lymph nodes. He had been in remission ever since. In January 2016 he had a PSA checked on  which actually came back at 10.7. It was thought at the time that he might have prostatitis. He was given some antibiotics. He then went down to [place]. There he had his PSA checked and was down to 6. He came back in April 2016 and had a repeat PSA checked which was elevated again at 15.7. Therefore Dr. Manjarrez asked him to come and see me.     He is also been seen by his urologist Dr. Fortunato Collazo. He had MRI CT scan and bone scan done. None of them revealed any lesion.     He had a ProstaScint scan which actually showed some uptake on the right prostatic fossa.  I sent him to Dr. Browne's for another ultrasound-guided biopsy of his right prostate bed.  The biopsy was negative for any detectable prostate cancer.     He then was seen at the [facility] and got a C11 PET-choline image that showed metastatic disease with bone mets and lymph node mets. He had an axillary lymph node biopsied that was consistent with prostate cancer. He has been started on Lupron in June 2016 and in July 26th 2016 started on Taxotere. Had his first cycle end of July 2016. Tolerated it with expected side effects. His Psa basically became undetectable very quickly. He has finished 6 cycles. Last one in November 2016.     Had C11 PET scan at [facility] that showed near complete response.  Eventually stopped hormonal therapy sometime in spring 2018.  He had been followed by PSAs and C 11 choline PET scan.  His PSA started to rise in January 2021 it was 0.19.  His CA 11 choline PET scan was negative.     In April 2021 his PSA was 0.71.  His choline PET scan revealed several low-level tiny pelvic lymph nodes in the external and common iliac lymph node chains.  He started his hormonal therapy.  His testosterone level checked in 4/16/2021 showed normal testosterone level.     PSA checked on 10/5/2021 showed less than 0.1. His PET scan done on 10/4/2021 at [facility] still showed some persistent uptake in the iliac lymph nodes. He was recommended to  consider radiation therapy to those lymph nodes. He is here to discuss that option.    However in October 2021 I recommended against radiation therapy.  Justin is happy with the recommendation.  Continued on Lupron therapy.  The PSA in the meantime has stayed undetectable.    Justin had another choline PET scan done at [facility] in April 2022.  It showed persistent uptake in the iliac lymph nodes.  However not a very high level of uptake.  There was a question of an uptake in the thoracic vertebral body.  The thoracic vertebral body was further evaluated with the help of an MRI which suggested that this perhaps could be inflammatory from a humeral node rather than a metastatic lesion.    Justin had consult with Dr. Richard Rey radiation oncologist at [facility].  Justin was given option of pelvic radiation up to 64 cGy including the prostate bed versus combination of Lupron with abiraterone.  I recommended against radiation and proceeding with Anti androgen therapy with Lupron alone.  Justin has been doing exceedingly well.  PSA continues to be undetectable.  Choline PET scan done in August 2022 showed improvement in all the areas of the disease that was visible before.  No new areas.    He has continued on Eligard every 6 months.  He had a PET scan (choline PET) done at [facility] on May 2023.  On that PET scan there was a new faint choline uptake near the left aspect of the vesicourethral anastomosis was noted.  Stable mild choline uptake in the small left common iliac lymph node was noted.  No suspicious bone metastases were noted.    Last Lupron shot was given in June 2023.  After that in December 2023 we decided to give him a break.  He was trying to enjoy some skiing.  He did end up going to [place] and ski did however met with an accident and injured his neck.  Very likely that it was not a more severe injury.  Was noted to have a nondisplaced odontoid fracture.  He is currently on a neck  "collar.  Rigidly immobilized.  Some of the androgen deprivation side effects did lakhwinder after few weeks of not taking Lupron.    Review of system      Details noted in the history of present illness.  A detailed review of systems is otherwise negative.      Physical exam        /85   Pulse 81   Temp 97.9  F (36.6  C)   Resp 18   Ht 1.803 m (5' 11\")   Wt 88.8 kg (195 lb 12.8 oz)   SpO2 95%   BMI 27.31 kg/m      GENERAL: No acute distress. Cooperative in conversation.   HEENT:  Pupils are equal, round and reactive. Oral mucosa is clean and intact. No ulcerations or mucositis noted. No bleeding noted.  RESP:Chest symmetric lungs are clear bilaterally per auscultation. Regular respiratory rate. No wheezes or rhonchi.  CV: Normal S1 S2 Regular, rate and rhythm.     ABD: Nondistended, soft, nontender. Positive bowel sounds. No organomegaly.   EXTREMITIES: No lower extremity edema.   NEURO: Non- focal. Alert and oriented x3.  Cranial nerves appear intact.  PSYCH: Within normal limits. No depression or anxiety.  SKIN: Warm dry intact.      Lab results Reviewed      No results found for this or any previous visit (from the past 168 hour(s)).    Imaging results Reviewed        No results found.    Total time spent was 35 minutes, more than half of it was in face-to-face counseling regarding disease state, treatment, side effects, documentation, review of clinical data and coordination of care     Signed by: Turner Sahni MD      This note has been dictated using voice recognition software. Any grammatical or context distortions are unintentional and inherent to the software     "

## 2024-09-23 NOTE — PROGRESS NOTES
[facility]: Cancer Care                                                                                            Situation: Patient chart reviewed by care coordinator.    Background: Patient with a diagnosis of metastatic prostate cancer with involvement of the axillary lymph node, bones and intra-abdominal lymph nodes.  See Dr Sahni's note from 9/19 for full diagnosis and treatment details.    Assessment: Patient was in the clinic on 9/19 to see Dr Sahni in follow-up.  He recommends that the patient continue treatment with Eligard 45 mg, once every 6 months.  We will also continue to follow his PSA.  Patient should have a DEXA scan soon.    Plan/Recommendations: Patient has been scheduled for a DEXA scan on 9/26.  I will watch for these results, review with Dr Sahni and update patient.  Patient saw his PSA level that came back at <0.01 via [employer].    His 6-month follow-up that is due in March 2025 has been deferred until 1/6/2025.  I will watch towards mid January to see if these have been arranged.    Signature:  Wilda Dahl RN

## 2024-09-25 ENCOUNTER — TRANSFERRED RECORDS (OUTPATIENT)
Dept: HEALTH INFORMATION MANAGEMENT | Facility: CLINIC | Age: 79
End: 2024-09-25
Payer: COMMERCIAL

## 2024-09-26 ENCOUNTER — IMMUNIZATION (OUTPATIENT)
Dept: FAMILY MEDICINE | Facility: CLINIC | Age: 79
End: 2024-09-26
Payer: COMMERCIAL

## 2024-09-26 ENCOUNTER — INFUSION THERAPY VISIT (OUTPATIENT)
Dept: INFUSION THERAPY | Facility: HOSPITAL | Age: 79
End: 2024-09-26
Attending: INTERNAL MEDICINE
Payer: COMMERCIAL

## 2024-09-26 ENCOUNTER — ANCILLARY PROCEDURE (OUTPATIENT)
Dept: BONE DENSITY | Facility: CLINIC | Age: 79
End: 2024-09-26
Attending: INTERNAL MEDICINE
Payer: COMMERCIAL

## 2024-09-26 VITALS
DIASTOLIC BLOOD PRESSURE: 73 MMHG | TEMPERATURE: 98.3 F | SYSTOLIC BLOOD PRESSURE: 115 MMHG | RESPIRATION RATE: 18 BRPM | HEART RATE: 79 BPM | OXYGEN SATURATION: 96 %

## 2024-09-26 DIAGNOSIS — C61 PROSTATE CANCER METASTATIC TO INTRAPELVIC LYMPH NODE (H): Primary | ICD-10-CM

## 2024-09-26 DIAGNOSIS — Z79.83 LONG TERM (CURRENT) USE OF BISPHOSPHONATES: ICD-10-CM

## 2024-09-26 DIAGNOSIS — C77.5 PROSTATE CANCER METASTATIC TO INTRAPELVIC LYMPH NODE (H): ICD-10-CM

## 2024-09-26 DIAGNOSIS — G62.9 NEUROPATHY: Primary | ICD-10-CM

## 2024-09-26 DIAGNOSIS — C61 PROSTATE CANCER METASTATIC TO INTRAPELVIC LYMPH NODE (H): ICD-10-CM

## 2024-09-26 DIAGNOSIS — Z23 ENCOUNTER FOR IMMUNIZATION: Primary | ICD-10-CM

## 2024-09-26 DIAGNOSIS — C77.5 PROSTATE CANCER METASTATIC TO INTRAPELVIC LYMPH NODE (H): Primary | ICD-10-CM

## 2024-09-26 PROCEDURE — 90662 IIV NO PRSV INCREASED AG IM: CPT

## 2024-09-26 PROCEDURE — G0008 ADMIN INFLUENZA VIRUS VAC: HCPCS

## 2024-09-26 PROCEDURE — 77091 TBS TECHL CALCULATION ONLY: CPT | Performed by: PHYSICIAN ASSISTANT

## 2024-09-26 PROCEDURE — 77080 DXA BONE DENSITY AXIAL: CPT | Mod: TC | Performed by: PHYSICIAN ASSISTANT

## 2024-09-26 PROCEDURE — 90480 ADMN SARSCOV2 VAC 1/ONLY CMP: CPT

## 2024-09-26 PROCEDURE — 250N000011 HC RX IP 250 OP 636: Performed by: INTERNAL MEDICINE

## 2024-09-26 PROCEDURE — 91320 SARSCV2 VAC 30MCG TRS-SUC IM: CPT

## 2024-09-26 PROCEDURE — 96402 CHEMO HORMON ANTINEOPL SQ/IM: CPT

## 2024-09-26 RX ORDER — CLOBETASOL PROPIONATE 0.5 MG/G
CREAM TOPICAL
Qty: 60 G | Refills: 3 | Status: SHIPPED | OUTPATIENT
Start: 2024-09-26

## 2024-09-26 RX ADMIN — LEUPROLIDE ACETATE 45 MG: 45 INJECTION, SUSPENSION, EXTENDED RELEASE SUBCUTANEOUS at 13:39

## 2024-09-26 ASSESSMENT — PAIN SCALES - GENERAL: PAINLEVEL: NO PAIN (0)

## 2024-09-26 NOTE — PROGRESS NOTES
Infusion Nursing Note:  Justin Pires presents today for Eligard injection.    Patient seen by provider today: No   present during visit today: Not Applicable.    Note: Patient assessed and vital sign stable. Administered Eligard subcutaneous (left gluteus per patient request) as ordered per provider. Tolerated without issue.      Intravenous Access:  No labs/ IV access.    Treatment Conditions:  Not Applicable.      Post Infusion Assessment:  Patient tolerated injection without incident.       Discharge Plan:   Patient and/or family verbalized understanding of discharge instructions and all questions answered.  Patient discharged in stable condition accompanied by: self.  Departure Mode: Ambulatory.      FRANCINE CRUZ RN

## 2024-10-01 ENCOUNTER — TELEPHONE (OUTPATIENT)
Dept: INTERNAL MEDICINE | Facility: CLINIC | Age: 79
End: 2024-10-01

## 2024-10-01 NOTE — TELEPHONE ENCOUNTER
Disp Refills Start End MINA   clobetasol propionate (TEMOVATE) 0.05 % external cream 60 g 3 9/26/2024 -- No   Sig: APPLY TO DISCOLORED AREAS TWICE A DAY     Pharmacy states pt wants the lotion not cream

## 2024-10-02 NOTE — TELEPHONE ENCOUNTER
Notified Dr. Manjarrez. Spoke with pharmacist and gave verbal ok for lotion vs cream topical clobetasol per patient preference.

## 2024-10-09 ENCOUNTER — TRANSFERRED RECORDS (OUTPATIENT)
Dept: HEALTH INFORMATION MANAGEMENT | Facility: CLINIC | Age: 79
End: 2024-10-09
Payer: COMMERCIAL

## 2024-11-06 ENCOUNTER — TRANSFERRED RECORDS (OUTPATIENT)
Dept: HEALTH INFORMATION MANAGEMENT | Facility: CLINIC | Age: 79
End: 2024-11-06
Payer: COMMERCIAL

## 2024-12-09 ENCOUNTER — NURSE TRIAGE (OUTPATIENT)
Dept: INTERNAL MEDICINE | Facility: CLINIC | Age: 79
End: 2024-12-09
Payer: COMMERCIAL

## 2024-12-09 NOTE — TELEPHONE ENCOUNTER
"Nurse Triage SBAR    Is this a 2nd Level Triage? YES, LICENSED PRACTITIONER REVIEW IS REQUIRED    Situation: Diarrhea    Background: hx colitis.    Assessment: States he has been dealing with this for the past week. States he has 5-6 episodes of diarrhea per day. States he has this \"every time I eat.\" States stools are a normal color, just pure liquid and not formed at all. Denies blood in stool or any signs of dehydration. States he has been trying OTC antidiarrheals at home including Pepto Bismol. Denies any fever or abdominal pain.     Protocol Recommended Disposition:   See in Office Today    Recommendation: States he is in [place] until Wednesday but is willing to be seen on Friday as that works best in his schedule. Appointment scheduled with Dr Rodrigues per his request. Instructed to monitor for worsening or additional symptoms and to stick to a bland diet. If he develops any signs of dehydration or abdominal pain he was instructed to call back or be seen sooner.     Routed to provider    Does the patient meet one of the following criteria for ADS visit consideration? 16+ years old, with an [facility] PCP     TIP  Providers, please consider if this condition is appropriate for management at one of our Acute and Diagnostic Services sites.     If patient is a good candidate, please use dotphrase <dot>triageresponse and select Refer to ADS to document.      Having bout of colitis, frequent diarrhea, 5-6 times per day every time I eat, started 1 week ago, tried at home antidiarrheals, pepto, unisom, no blood or fever, in [place] until Wednesday,   Reason for Disposition   MODERATE diarrhea (e.g., 4-6 times / day more than normal) and present > 48 hours (2 days)    Additional Information   Negative: SEVERE diarrhea (e.g., 7 or more times / day more than normal) and present > 24 hours (1 day)   Negative: SEVERE abdominal pain (e.g., excruciating) and present > 1 hour   Negative: SEVERE abdominal pain and age > 60 " years   Negative: Bloody, black, or tarry bowel movements  (Exception: Chronic-unchanged black-grey bowel movements and is taking iron pills or Pepto-Bismol.)   Negative: SEVERE diarrhea (e.g., 7 or more times / day more than normal) and age > 60 years   Negative: Constant abdominal pain lasting > 2 hours   Negative: Drinking very little and dehydration suspected (e.g., no urine > 12 hours, very dry mouth, very lightheaded)   Negative: Patient sounds very sick or weak to the triager   Negative: Vomiting also present and worse than the diarrhea   Negative: Blood in stool and without diarrhea   Negative: Diarrhea begins while taking an antibiotic by mouth (oral antibiotic)   Negative: Shock suspected (e.g., cold/pale/clammy skin, too weak to stand, low BP, rapid pulse)   Negative: Difficult to awaken or acting confused (e.g., disoriented, slurred speech)   Negative: Sounds like a life-threatening emergency to the triager    Protocols used: Diarrhea-A-OH

## 2024-12-14 ENCOUNTER — MYC MEDICAL ADVICE (OUTPATIENT)
Dept: INTERNAL MEDICINE | Facility: CLINIC | Age: 79
End: 2024-12-14
Payer: COMMERCIAL

## 2024-12-14 DIAGNOSIS — M35.9 CONNECTIVE TISSUE DISEASE (H): Primary | ICD-10-CM

## 2024-12-16 RX ORDER — HYDROXYCHLOROQUINE SULFATE 200 MG/1
200 TABLET, FILM COATED ORAL DAILY
COMMUNITY
Start: 2024-12-16

## 2024-12-16 RX ORDER — VIT C/B6/B5/MAGNESIUM/HERB 173 50-5-6-5MG
CAPSULE ORAL
COMMUNITY
Start: 2024-12-16

## 2025-02-11 ENCOUNTER — PATIENT OUTREACH (OUTPATIENT)
Dept: ONCOLOGY | Facility: HOSPITAL | Age: 80
End: 2025-02-11
Payer: COMMERCIAL

## 2025-02-11 NOTE — PROGRESS NOTES
[facility]: Cancer Care                                                                                            Situation: Patient chart reviewed by care coordinator.    Background: Patient with a diagnosis of metastatic prostate cancer with involvement of the axillary lymph node, bones and intra-abdominal lymph nodes. See Dr Sahni's note from 9/19 for full diagnosis and treatment details.     Assessment: Patient was last seen in the clinic by Dr Sahni on 9/19/2024.  At that time it was recommended that patient come back in 6 months for labs, see Dr Sahni and receive his next Eligard injection.    Plan/Recommendations: I do not see that the patient has been scheduled for this yet.  [employer] message sent to the patient letting him know he was due towards the end of March to come back to our clinic.  I let him know that our schedulers would be reaching out but he could certainly give us a call back at [phone] to get this arranged.    I will continue to check and make sure appointments are made.    Signature:  Wilda Dahl RN

## 2025-02-13 ENCOUNTER — TELEPHONE (OUTPATIENT)
Dept: ONCOLOGY | Facility: HOSPITAL | Age: 80
End: 2025-02-13
Payer: COMMERCIAL

## 2025-03-17 ENCOUNTER — PATIENT OUTREACH (OUTPATIENT)
Dept: CARE COORDINATION | Facility: CLINIC | Age: 80
End: 2025-03-17
Payer: COMMERCIAL

## 2025-04-16 ENCOUNTER — INFUSION THERAPY VISIT (OUTPATIENT)
Dept: INFUSION THERAPY | Facility: HOSPITAL | Age: 80
End: 2025-04-16
Payer: COMMERCIAL

## 2025-04-16 ENCOUNTER — PATIENT OUTREACH (OUTPATIENT)
Dept: ONCOLOGY | Facility: HOSPITAL | Age: 80
End: 2025-04-16

## 2025-04-16 ENCOUNTER — LAB (OUTPATIENT)
Dept: INFUSION THERAPY | Facility: HOSPITAL | Age: 80
End: 2025-04-16
Payer: COMMERCIAL

## 2025-04-16 ENCOUNTER — ONCOLOGY VISIT (OUTPATIENT)
Dept: ONCOLOGY | Facility: HOSPITAL | Age: 80
End: 2025-04-16
Payer: COMMERCIAL

## 2025-04-16 VITALS
SYSTOLIC BLOOD PRESSURE: 143 MMHG | HEART RATE: 61 BPM | WEIGHT: 196.3 LBS | TEMPERATURE: 98.2 F | OXYGEN SATURATION: 97 % | RESPIRATION RATE: 18 BRPM | BODY MASS INDEX: 27.48 KG/M2 | HEIGHT: 71 IN | DIASTOLIC BLOOD PRESSURE: 83 MMHG

## 2025-04-16 DIAGNOSIS — C77.5 PROSTATE CANCER METASTATIC TO INTRAPELVIC LYMPH NODE (H): ICD-10-CM

## 2025-04-16 DIAGNOSIS — C61 PROSTATE CANCER METASTATIC TO INTRAPELVIC LYMPH NODE (H): Primary | ICD-10-CM

## 2025-04-16 DIAGNOSIS — C41.2 MALIGNANT NEOPLASM OF VERTEBRAL COLUMN, EXCLUDING SACRUM AND COCCYX (H): ICD-10-CM

## 2025-04-16 DIAGNOSIS — C77.5 PROSTATE CANCER METASTATIC TO INTRAPELVIC LYMPH NODE (H): Primary | ICD-10-CM

## 2025-04-16 DIAGNOSIS — C61 PROSTATE CANCER METASTATIC TO INTRAPELVIC LYMPH NODE (H): ICD-10-CM

## 2025-04-16 LAB — PSA SERPL DL<=0.01 NG/ML-MCNC: <0.01 NG/ML

## 2025-04-16 PROCEDURE — 36415 COLL VENOUS BLD VENIPUNCTURE: CPT

## 2025-04-16 PROCEDURE — 99214 OFFICE O/P EST MOD 30 MIN: CPT | Performed by: INTERNAL MEDICINE

## 2025-04-16 PROCEDURE — G2211 COMPLEX E/M VISIT ADD ON: HCPCS | Performed by: INTERNAL MEDICINE

## 2025-04-16 PROCEDURE — 84153 ASSAY OF PSA TOTAL: CPT

## 2025-04-16 PROCEDURE — 96402 CHEMO HORMON ANTINEOPL SQ/IM: CPT

## 2025-04-16 PROCEDURE — G0463 HOSPITAL OUTPT CLINIC VISIT: HCPCS | Performed by: INTERNAL MEDICINE

## 2025-04-16 PROCEDURE — 250N000011 HC RX IP 250 OP 636: Mod: JZ | Performed by: INTERNAL MEDICINE

## 2025-04-16 RX ADMIN — LEUPROLIDE ACETATE 45 MG: 45 INJECTION, SUSPENSION, EXTENDED RELEASE SUBCUTANEOUS at 11:05

## 2025-04-16 ASSESSMENT — PAIN SCALES - GENERAL: PAINLEVEL_OUTOF10: NO PAIN (0)

## 2025-04-16 NOTE — PROGRESS NOTES
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  Metastatic prostate cancer with involvement of the axillary lymph node, bones and intra-abdominal lymph nodes. He responded very well to treatment with Taxotere chemotherapy followed by hormonal therapy. Was off hormonal therapy from I believe April 2018 until May 2021.  After starting hormonal therapy PSA is undetectable.  Choline PET scan in April 2022 still showed low-level activity in the iliac lymph nodes.  Question of small uptake in the thoracic vertebral body which was felt to be not clearly metastatic on the MRI which was done subsequently.  Currently treatment with with Anti-androgen therapy with Lupron alone.  May 2023 C-14 PET scan shows no active disease except from a small area of urethrovesical junction which appears to have some activity. PSA is undetectable when tested in May 2023.  Break from Lupron for few months.  Resumed back in September 2024.  2.  Was given recommendation to consider salvage radiation therapy to treat pelvic lymph nodes.  However he has not done that.  He is doing quite well in spite of that.  PSA is pending from today.  3.  Hormone deprivation side effects.  Justin is due for his Eligard.  4.  Musculoskeletal stiffness in his hands and hip joints.   5.  History of mishap on ski slopes the first week of March 2024.  Fell forward and had neck injury.  Turns out he had nondisplaced fracture of the odontoid.  Was treated at [facility] and given the option of surgical intervention  versus neck collar and conservative management.  Justin chose conservative management.  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      Plan      Recommend treatment with Eligard 45 mg every 6 months.  Follow-up on the PSA.  He has taken alendronate for over 15 years. I think he should stop it. Will monitor bone density. Next one will be in September 2026.  Follow-up with neurosurgery and follow the recommendation regarding the odontoid fracture issues.  The longitudinal plan of care for the diagnosis(es)/condition(s) as documented were addressed during this visit. Due to the added complexity in care, I will continue to support Justin in the subsequent management and with ongoing continuity of care.     Medical decision making      Moderately complex.  Presenting with moderate to moderately severe side effects of androgen deprivation therapy.  Reviewed notes from [facility].  Reviewed the images of the CT of the spine showing the odontoid fracture.  Reviewed labs including PSA etc.  Ordered treatment and labs.    Risk      Significant risk of morbidity mortality.  Significant risk of side effects from intervention.       Cancer Staging   No matching staging information was found for the patient.        History of present illness        Mr. Justin Pires male with a history of elevated PSA. He is actually prior history of prostate cancer diagnosed in 1992 when he presented to [facility] for a executive physical and was found to have elevated PSA. He was completely is symptomatically that point. His workup then revealed that he indeed had prostate cancer. At that point at 47 years of age he underwent retropubic prostatectomy which revealed prostate cancer involving the left lobe of the prostate grade 2 out of 3 with negative margins and negative lymph nodes. He had been in remission ever since. In January 2016 he had a PSA checked on which actually came back at 10.7. It  was thought at the time that he might have prostatitis. He was given some antibiotics. He then went down to [place]. There he had his PSA checked and was down to 6. He came back in April 2016 and had a repeat PSA checked which was elevated again at 15.7. Therefore Dr. Manjarrez asked him to come and see me.     He is also been seen by his urologist Dr. Fortunato Collazo. He had MRI CT scan and bone scan done. None of them revealed any lesion.     He had a ProstaScint scan which actually showed some uptake on the right prostatic fossa.  I sent him to Dr. Browne's for another ultrasound-guided biopsy of his right prostate bed.  The biopsy was negative for any detectable prostate cancer.     He then was seen at the [facility] and got a C11 PET-choline image that showed metastatic disease with bone mets and lymph node mets. He had an axillary lymph node biopsied that was consistent with prostate cancer. He has been started on Lupron in June 2016 and in July 26th 2016 started on Taxotere. Had his first cycle end of July 2016. Tolerated it with expected side effects. His Psa basically became undetectable very quickly. He has finished 6 cycles. Last one in November 2016.     Had C11 PET scan at [place] that showed near complete response.  Eventually stopped hormonal therapy sometime in spring 2018.  He had been followed by PSAs and C 11 choline PET scan.  His PSA started to rise in January 2021 it was 0.19.  His CA 11 choline PET scan was negative.     In April 2021 his PSA was 0.71.  His choline PET scan revealed several low-level tiny pelvic lymph nodes in the external and common iliac lymph node chains.  He started his hormonal therapy.  His testosterone level checked in 4/16/2021 showed normal testosterone level.     PSA checked on 10/5/2021 showed less than 0.1. His PET scan done on 10/4/2021 at [facility] still showed some persistent uptake in the iliac lymph nodes. He was recommended to consider radiation therapy to those  lymph nodes. He is here to discuss that option.    However in October 2021 I recommended against radiation therapy.  Justin is happy with the recommendation.  Continued on Lupron therapy.  The PSA in the meantime has stayed undetectable.    Justin had another choline PET scan done at [facility] in April 2022.  It showed persistent uptake in the iliac lymph nodes.  However not a very high level of uptake.  There was a question of an uptake in the thoracic vertebral body.  The thoracic vertebral body was further evaluated with the help of an MRI which suggested that this perhaps could be inflammatory from a humeral node rather than a metastatic lesion.    Justin had consult with Dr. Richard Rey radiation oncologist at [facility].  Justin was given option of pelvic radiation up to 64 cGy including the prostate bed versus combination of Lupron with abiraterone.  I recommended against radiation and proceeding with Anti androgen therapy with Lupron alone.  Justin has been doing exceedingly well.  PSA continues to be undetectable.  Choline PET scan done in August 2022 showed improvement in all the areas of the disease that was visible before.  No new areas.    He has continued on Eligard every 6 months.  He had a PET scan (choline PET) done at [facility] on May 2023.  On that PET scan there was a new faint choline uptake near the left aspect of the vesicourethral anastomosis was noted.  Stable mild choline uptake in the small left common iliac lymph node was noted.  No suspicious bone metastases were noted.    In December 2023 we decided to give him a break.  He was trying to enjoy some skiing.  He did end up going to [place] and ski did however met with an accident and injured his neck. Was noted to have a nondisplaced odontoid fracture.    We resumed his Eligard therapy in September 2024.  Started to notice the side effects immediately.  He had some GI issues in December 2024.  Comes in today for  "follow-up.  Had labs drawn recently.  He had CT scan done last month at [facility] for his odontoid fracture.  The CT scan still shows no significant healing.  The CT almost indicates nonunion.  Had an episode of colitis in December 2024. Needed steroids for that.     Review of system      Details noted in the history of present illness.  A detailed review of systems is otherwise negative.      Physical exam        BP (!) 143/83   Pulse 61   Temp 98.2  F (36.8  C)   Resp 18   Ht 1.803 m (5' 11\")   Wt 89 kg (196 lb 4.8 oz)   SpO2 97%   BMI 27.38 kg/m      GENERAL: No acute distress. Cooperative in conversation.   HEENT:  Pupils are equal, round and reactive. Oral mucosa is clean and intact. No ulcerations or mucositis noted. No bleeding noted.  RESP:Chest symmetric lungs are clear bilaterally per auscultation. Regular respiratory rate. No wheezes or rhonchi.  CV: Normal S1 S2 Regular, rate and rhythm.     ABD: Nondistended, soft, nontender. Positive bowel sounds. No organomegaly.   EXTREMITIES: No lower extremity edema.   NEURO: Non- focal. Alert and oriented x3.  Cranial nerves appear intact.  PSYCH: Within normal limits. No depression or anxiety.  SKIN: Warm dry intact.      Lab results Reviewed      No results found for this or any previous visit (from the past week).    Imaging results Reviewed        CT Cervical Spine without IV Contrast    Result Date: 4/11/2025  EXAM: CT CERVICAL SPINE WITHOUT IV CONTRAST COMPARISON: CT cervical spine 8/5/2024 and 5/15/2024. FINDINGS: Again seen is a type II odontoid fracture without significant interval healing. Fracture plane remains visible. The fracture planes demonstrate interval cortical bone formation suggesting nonunion. There is interval fusion across the anterior C1-2 articulation Diffuse osseous demineralization. No new fracture is identified. Grade 1 anterolisthesis of C7 likely on a degenerative basis. Sagittal alignment is relatively preserved at the " remaining levels. Multilevel spondylosis. There is osseous fusion across the C4-5 level. There is right posterolateral osseous fusion across the C3-4 level. Severe degenerative disease at C6-7 as well as uncovertebral arthropathy result in moderate to severe bilateral foraminal narrowing. Moderate bilateral foraminal narrowing at C5-6 and moderate to severe right foraminal narrowing at C3-4. There is no evidence of high-grade spinal canal stenosis.    1. Findings are again suggestive of nonunion about the type II odontoid fracture. Stable alignment. Interval fusion across the anterior C1-2 articulation. Otherwise, no significant interval change    DX Cervical Spine 2-3 Views    Result Date: 4/11/2025  EXAM: DX CERVICAL SPINE 2-3 VIEWS    Stable odontoid fracture, better evaluated on same-day CT. Stable alignment. Multilevel disc height loss, endplate spurring, and facet arthropathy with associated partial ankylosis of C4-5 and multiple facets.       Signed by: Turner Sahni MD      This note has been dictated using voice recognition software. Any grammatical or context distortions are unintentional and inherent to the software

## 2025-04-16 NOTE — PROGRESS NOTES
Infusion Nursing Note:  Justin Pires presents today for Eligard.    Patient seen by provider today: Yes: Dr Sahni   present during visit today: Not Applicable.    Note: He was educated on his plan of care and drug was reviewed prior to administration. Eligard was reconstituted per manufacturers instructions and given subcutaneous into his right upper outer buttock.      Intravenous Access:  N/a.    Treatment Conditions:  Not Applicable.      Post Infusion Assessment:  Patient tolerated injection without incident.       Discharge Plan:   Patient discharged in stable condition accompanied by: self.  Departure Mode: Ambulatory.      Anai Miner RN

## 2025-04-16 NOTE — PROGRESS NOTES
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    Weight as of this encounter: 89 kg (196 lb 4.8 oz). Body surface area is 2.11 meters squared.  No Pain (0) Comment: Data Unavailable   No LMP for male patient.  Allergies reviewed: Yes  Medications reviewed: Yes    Medications: Medication refills not needed today.  Pharmacy name entered into [email or web address]: [id number] [place], [place] - [address] [place] [address] AVE    Frailty Screening:   Is the patient here for a new oncology consult visit in cancer care? 2. No    PHQ9:  Did this patient require a PHQ9?: No      Clinical concerns: None      Liana Rick LPN             "

## 2025-04-16 NOTE — PROGRESS NOTES
"[facility]: Cancer Care                                                                                            Situation: Patient chart reviewed by care coordinator.    Background: Patient with a diagnosis of metastatic prostate cancer with involvement of the axillary lymph node, bones and intra-abdominal lymph nodes. See Dr Sahni's note from today for full diagnosis and treatment details.     Assessment: Patient comes in today for 6-month follow-up with Dr Sahni after having labs drawn just prior to this appointment.  After the visit it was recommended that the patient follow-up in another 6 months with lab and provider visit as well as his 6-month Eligard injection.    Plan/Recommendations: Patient did not schedule before he left the clinic today.  His \"checkout appointment request\" has been deferred until 7/15/2025.  I will follow-up sometime after that to make sure appointments have been arranged.    PSA was drawn today.  Will monitor and discuss with Dr Sahni to see if we need to update the patient or if the [facility]    Signature:  Wilda Dahl RN    "

## 2025-04-16 NOTE — LETTER
4/16/2025      Justin Pires  [address] [place] [address]      Dear Colleague,    Thank you for referring your patient, Justin Pires, to the [facility] [place]. Please see a copy of my visit note below.    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  Metastatic prostate cancer with involvement of the axillary lymph node, bones and intra-abdominal lymph nodes. He responded very well to treatment with Taxotere chemotherapy followed by hormonal therapy. Was off hormonal therapy from I believe April 2018 until May 2021.  After starting hormonal therapy PSA is undetectable.  Choline PET scan in April 2022 still showed low-level activity in the iliac lymph nodes.  Question of small uptake in the thoracic vertebral body which was felt to be not clearly metastatic on the MRI which was done subsequently.  Currently treatment with with Anti-androgen therapy with Lupron alone.  May 2023 C-14 PET scan shows no active disease except from a small area of urethrovesical junction which appears to have some activity. PSA is undetectable when tested in May 2023.  Break from Lupron for few months.  Resumed back in September 2024.  2.  Was given recommendation to consider salvage radiation therapy to treat pelvic lymph nodes.  However he has not done that.  He is doing quite well in spite of that.  PSA is pending from today.  3.  Hormone deprivation side effects.  Justin is due for his Eligard.  4.  Musculoskeletal stiffness in his hands and hip joints.    5.  History of mishap on ski slopes the first week of March 2024.  Fell forward and had neck injury.  Turns out he had nondisplaced fracture of the odontoid.  Was treated at [facility] and given the option of surgical intervention versus neck collar and conservative management.  Justin chose conservative management.  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      Plan      Recommend treatment with Eligard 45 mg every 6 months.  Follow-up on the PSA.  He has taken alendronate for over 15 years. I think he should stop it. Will monitor bone density. Next one will be in September 2026.  Follow-up with neurosurgery and follow the recommendation regarding the odontoid fracture issues.  The longitudinal plan of care for the diagnosis(es)/condition(s) as documented were addressed during this visit. Due to the added complexity in care, I will continue to support Justin in the subsequent management and with ongoing continuity of care.     Medical decision making      Moderately complex.  Presenting with moderate to moderately severe side effects of androgen deprivation therapy.  Reviewed notes from [facility] Department.  Reviewed the images of the CT of the spine showing the odontoid fracture.  Reviewed labs including PSA etc.  Ordered treatment and labs.    Risk      Significant risk of morbidity mortality.  Significant risk of side effects from intervention.       Cancer Staging   No matching staging information was found for the patient.        History of present illness        Mr. Justin Pires male with a history of elevated PSA. He is actually prior history of prostate cancer diagnosed in 1992 when he presented to [facility] for a executive physical and was found to have elevated PSA. He was completely is symptomatically that point. His workup then revealed that he indeed had prostate cancer. At that point at 47 years of age he underwent retropubic prostatectomy which revealed  prostate cancer involving the left lobe of the prostate grade 2 out of 3 with negative margins and negative lymph nodes. He had been in remission ever since. In January 2016 he had a PSA checked on which actually came back at 10.7. It was thought at the time that he might have prostatitis. He was given some antibiotics. He then went down to [place]. There he had his PSA checked and was down to 6. He came back in April 2016 and had a repeat PSA checked which was elevated again at 15.7. Therefore Dr. Manjarrez asked him to come and see me.     He is also been seen by his urologist Dr. Fortunato Collazo. He had MRI CT scan and bone scan done. None of them revealed any lesion.     He had a ProstaScint scan which actually showed some uptake on the right prostatic fossa.  I sent him to Dr. Browne's for another ultrasound-guided biopsy of his right prostate bed.  The biopsy was negative for any detectable prostate cancer.     He then was seen at the [facility] and got a C11 PET-choline image that showed metastatic disease with bone mets and lymph node mets. He had an axillary lymph node biopsied that was consistent with prostate cancer. He has been started on Lupron in June 2016 and in July 26th 2016 started on Taxotere. Had his first cycle end of July 2016. Tolerated it with expected side effects. His Psa basically became undetectable very quickly. He has finished 6 cycles. Last one in November 2016.     Had C11 PET scan at [place] that showed near complete response.  Eventually stopped hormonal therapy sometime in spring 2018.  He had been followed by PSAs and C 11 choline PET scan.  His PSA started to rise in January 2021 it was 0.19.  His CA 11 choline PET scan was negative.     In April 2021 his PSA was 0.71.  His choline PET scan revealed several low-level tiny pelvic lymph nodes in the external and common iliac lymph node chains.  He started his hormonal therapy.  His testosterone level checked in 4/16/2021 showed normal  testosterone level.     PSA checked on 10/5/2021 showed less than 0.1. His PET scan done on 10/4/2021 at [facility] still showed some persistent uptake in the iliac lymph nodes. He was recommended to consider radiation therapy to those lymph nodes. He is here to discuss that option.    However in October 2021 I recommended against radiation therapy.  Justin is happy with the recommendation.  Continued on Lupron therapy.  The PSA in the meantime has stayed undetectable.    Justin had another choline PET scan done at [facility] in April 2022.  It showed persistent uptake in the iliac lymph nodes.  However not a very high level of uptake.  There was a question of an uptake in the thoracic vertebral body.  The thoracic vertebral body was further evaluated with the help of an MRI which suggested that this perhaps could be inflammatory from a humeral node rather than a metastatic lesion.    Justin had consult with Dr. Richard Rey radiation oncologist at [facility].  Justin was given option of pelvic radiation up to 64 cGy including the prostate bed versus combination of Lupron with abiraterone.  I recommended against radiation and proceeding with Anti androgen therapy with Lupron alone.  Justin has been doing exceedingly well.  PSA continues to be undetectable.  Choline PET scan done in August 2022 showed improvement in all the areas of the disease that was visible before.  No new areas.    He has continued on Eligard every 6 months.  He had a PET scan (choline PET) done at [facility] on May 2023.  On that PET scan there was a new faint choline uptake near the left aspect of the vesicourethral anastomosis was noted.  Stable mild choline uptake in the small left common iliac lymph node was noted.  No suspicious bone metastases were noted.    In December 2023 we decided to give him a break.  He was trying to enjoy some skiing.  He did end up going to [place] and ski did however met with an accident and  "injured his neck. Was noted to have a nondisplaced odontoid fracture.    We resumed his Eligard therapy in September 2024.  Started to notice the side effects immediately.  He had some GI issues in December 2024.  Comes in today for follow-up.  Had labs drawn recently.  He had CT scan done last month at [facility] for his odontoid fracture.  The CT scan still shows no significant healing.  The CT almost indicates nonunion.  Had an episode of colitis in December 2024. Needed steroids for that.     Review of system      Details noted in the history of present illness.  A detailed review of systems is otherwise negative.      Physical exam        BP (!) 143/83   Pulse 61   Temp 98.2  F (36.8  C)   Resp 18   Ht 1.803 m (5' 11\")   Wt 89 kg (196 lb 4.8 oz)   SpO2 97%   BMI 27.38 kg/m      GENERAL: No acute distress. Cooperative in conversation.   HEENT:  Pupils are equal, round and reactive. Oral mucosa is clean and intact. No ulcerations or mucositis noted. No bleeding noted.  RESP:Chest symmetric lungs are clear bilaterally per auscultation. Regular respiratory rate. No wheezes or rhonchi.  CV: Normal S1 S2 Regular, rate and rhythm.     ABD: Nondistended, soft, nontender. Positive bowel sounds. No organomegaly.   EXTREMITIES: No lower extremity edema.   NEURO: Non- focal. Alert and oriented x3.  Cranial nerves appear intact.  PSYCH: Within normal limits. No depression or anxiety.  SKIN: Warm dry intact.      Lab results Reviewed      No results found for this or any previous visit (from the past week).    Imaging results Reviewed        CT Cervical Spine without IV Contrast    Result Date: 4/11/2025  EXAM: CT CERVICAL SPINE WITHOUT IV CONTRAST COMPARISON: CT cervical spine 8/5/2024 and 5/15/2024. FINDINGS: Again seen is a type II odontoid fracture without significant interval healing. Fracture plane remains visible. The fracture planes demonstrate interval cortical bone formation suggesting nonunion. There is " "interval fusion across the anterior C1-2 articulation Diffuse osseous demineralization. No new fracture is identified. Grade 1 anterolisthesis of C7 likely on a degenerative basis. Sagittal alignment is relatively preserved at the remaining levels. Multilevel spondylosis. There is osseous fusion across the C4-5 level. There is right posterolateral osseous fusion across the C3-4 level. Severe degenerative disease at C6-7 as well as uncovertebral arthropathy result in moderate to severe bilateral foraminal narrowing. Moderate bilateral foraminal narrowing at C5-6 and moderate to severe right foraminal narrowing at C3-4. There is no evidence of high-grade spinal canal stenosis.    1. Findings are again suggestive of nonunion about the type II odontoid fracture. Stable alignment. Interval fusion across the anterior C1-2 articulation. Otherwise, no significant interval change    DX Cervical Spine 2-3 Views    Result Date: 4/11/2025  EXAM: DX CERVICAL SPINE 2-3 VIEWS    Stable odontoid fracture, better evaluated on same-day CT. Stable alignment. Multilevel disc height loss, endplate spurring, and facet arthropathy with associated partial ankylosis of C4-5 and multiple facets.       Signed by: Turner Sahni MD      This note has been dictated using voice recognition software. 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    Weight as of this encounter: 89 kg (196 lb 4.8 oz). Body surface area is 2.11 meters squared.  No Pain (0) Comment: Data Unavailable   No LMP for male patient.  Allergies reviewed: Yes  Medications reviewed: Yes    Medications: Medication refills not needed today.  Pharmacy name entered into [facility]: [id number] [place], [place] - [facility] [address]    Frailty Screening:   Is the patient here for a new oncology consult visit in cancer care? 2. No    PHQ9:  Did this patient require a PHQ9?: No      Clinical concerns: None      Liana Rick LPN                 Again, thank you for allowing me to participate in the care of your patient.        Sincerely,        Turner Sahni MD    Electronically signed"

## 2025-05-14 DIAGNOSIS — H72.90 PERFORATION OF TYMPANIC MEMBRANE, UNSPECIFIED LATERALITY: Primary | ICD-10-CM

## 2025-05-15 ENCOUNTER — PATIENT OUTREACH (OUTPATIENT)
Dept: CARE COORDINATION | Facility: CLINIC | Age: 80
End: 2025-05-15
Payer: COMMERCIAL

## 2025-05-30 DIAGNOSIS — E78.00 PURE HYPERCHOLESTEROLEMIA: ICD-10-CM

## 2025-06-02 RX ORDER — FLUVASTATIN 20 MG/1
20 CAPSULE ORAL AT BEDTIME
Qty: 90 CAPSULE | Refills: 3 | Status: SHIPPED | OUTPATIENT
Start: 2025-06-02

## 2025-06-28 DIAGNOSIS — F32.5 MAJOR DEPRESSIVE DISORDER WITH SINGLE EPISODE, IN REMISSION: ICD-10-CM

## 2025-06-28 DIAGNOSIS — E03.4 HYPOTHYROIDISM DUE TO ACQUIRED ATROPHY OF THYROID: ICD-10-CM

## 2025-06-28 DIAGNOSIS — G62.9 NEUROPATHY: ICD-10-CM

## 2025-06-30 RX ORDER — LEVOTHYROXINE SODIUM 125 UG/1
125 TABLET ORAL DAILY
Qty: 90 TABLET | Refills: 0 | Status: SHIPPED | OUTPATIENT
Start: 2025-06-30

## 2025-06-30 RX ORDER — ESCITALOPRAM OXALATE 10 MG/1
10 TABLET ORAL DAILY
Qty: 90 TABLET | Refills: 3 | Status: SHIPPED | OUTPATIENT
Start: 2025-06-30

## 2025-07-08 ENCOUNTER — OFFICE VISIT (OUTPATIENT)
Dept: INTERNAL MEDICINE | Facility: CLINIC | Age: 80
End: 2025-07-08
Payer: COMMERCIAL

## 2025-07-08 VITALS
OXYGEN SATURATION: 96 % | SYSTOLIC BLOOD PRESSURE: 112 MMHG | HEIGHT: 72 IN | HEART RATE: 74 BPM | TEMPERATURE: 98.4 F | RESPIRATION RATE: 15 BRPM | DIASTOLIC BLOOD PRESSURE: 72 MMHG | WEIGHT: 196.3 LBS | BODY MASS INDEX: 26.59 KG/M2

## 2025-07-08 DIAGNOSIS — L20.84 INTRINSIC ECZEMA: ICD-10-CM

## 2025-07-08 DIAGNOSIS — H72.92 PERFORATION OF LEFT TYMPANIC MEMBRANE: ICD-10-CM

## 2025-07-08 DIAGNOSIS — C77.5 PROSTATE CANCER METASTATIC TO INTRAPELVIC LYMPH NODE (H): ICD-10-CM

## 2025-07-08 DIAGNOSIS — Z00.00 MEDICARE ANNUAL WELLNESS VISIT, SUBSEQUENT: Primary | ICD-10-CM

## 2025-07-08 DIAGNOSIS — G62.9 NEUROPATHY: ICD-10-CM

## 2025-07-08 DIAGNOSIS — F32.5 MAJOR DEPRESSIVE DISORDER WITH SINGLE EPISODE, IN REMISSION: ICD-10-CM

## 2025-07-08 DIAGNOSIS — E03.4 HYPOTHYROIDISM DUE TO ACQUIRED ATROPHY OF THYROID: ICD-10-CM

## 2025-07-08 DIAGNOSIS — N39.41 URGE INCONTINENCE OF URINE: ICD-10-CM

## 2025-07-08 DIAGNOSIS — M35.9 CONNECTIVE TISSUE DISEASE: ICD-10-CM

## 2025-07-08 DIAGNOSIS — M16.11 PRIMARY OSTEOARTHRITIS OF RIGHT HIP: ICD-10-CM

## 2025-07-08 DIAGNOSIS — M89.9 DISORDER OF BONE AND CARTILAGE: ICD-10-CM

## 2025-07-08 DIAGNOSIS — M94.9 DISORDER OF BONE AND CARTILAGE: ICD-10-CM

## 2025-07-08 DIAGNOSIS — C61 PROSTATE CANCER METASTATIC TO INTRAPELVIC LYMPH NODE (H): ICD-10-CM

## 2025-07-08 DIAGNOSIS — E78.00 PURE HYPERCHOLESTEROLEMIA: ICD-10-CM

## 2025-07-08 PROBLEM — C77.8 SECONDARY AND UNSPECIFIED MALIGNANT NEOPLASM OF LYMPH NODES OF MULTIPLE REGIONS (H): Status: ACTIVE | Noted: 2025-07-08

## 2025-07-08 LAB
ALBUMIN UR-MCNC: NEGATIVE MG/DL
APPEARANCE UR: CLEAR
BASOPHILS # BLD AUTO: 0.1 10E3/UL (ref 0–0.2)
BASOPHILS NFR BLD AUTO: 1 %
BILIRUB UR QL STRIP: NEGATIVE
COLOR UR AUTO: YELLOW
EOSINOPHIL # BLD AUTO: 0.3 10E3/UL (ref 0–0.7)
EOSINOPHIL NFR BLD AUTO: 5 %
ERYTHROCYTE [DISTWIDTH] IN BLOOD BY AUTOMATED COUNT: 11.6 % (ref 10–15)
GLUCOSE UR STRIP-MCNC: NEGATIVE MG/DL
HCT VFR BLD AUTO: 41.3 % (ref 40–53)
HGB BLD-MCNC: 14.7 G/DL (ref 13.3–17.7)
HGB UR QL STRIP: NEGATIVE
IMM GRANULOCYTES # BLD: 0 10E3/UL
IMM GRANULOCYTES NFR BLD: 0 %
KETONES UR STRIP-MCNC: NEGATIVE MG/DL
LEUKOCYTE ESTERASE UR QL STRIP: NEGATIVE
LYMPHOCYTES # BLD AUTO: 2.2 10E3/UL (ref 0.8–5.3)
LYMPHOCYTES NFR BLD AUTO: 36 %
MCH RBC QN AUTO: 33.8 PG (ref 26.5–33)
MCHC RBC AUTO-ENTMCNC: 35.6 G/DL (ref 31.5–36.5)
MCV RBC AUTO: 95 FL (ref 78–100)
MONOCYTES # BLD AUTO: 0.7 10E3/UL (ref 0–1.3)
MONOCYTES NFR BLD AUTO: 12 %
NEUTROPHILS # BLD AUTO: 2.8 10E3/UL (ref 1.6–8.3)
NEUTROPHILS NFR BLD AUTO: 46 %
NITRATE UR QL: NEGATIVE
PH UR STRIP: 6 [PH] (ref 5–8)
PLATELET # BLD AUTO: 206 10E3/UL (ref 150–450)
RBC # BLD AUTO: 4.35 10E6/UL (ref 4.4–5.9)
SP GR UR STRIP: 1.01 (ref 1–1.03)
UROBILINOGEN UR STRIP-ACNC: 0.2 E.U./DL
WBC # BLD AUTO: 6 10E3/UL (ref 4–11)

## 2025-07-08 PROCEDURE — 96372 THER/PROPH/DIAG INJ SC/IM: CPT | Performed by: INTERNAL MEDICINE

## 2025-07-08 PROCEDURE — G0439 PPPS, SUBSEQ VISIT: HCPCS | Performed by: INTERNAL MEDICINE

## 2025-07-08 PROCEDURE — 81003 URINALYSIS AUTO W/O SCOPE: CPT | Performed by: INTERNAL MEDICINE

## 2025-07-08 PROCEDURE — 3074F SYST BP LT 130 MM HG: CPT | Performed by: INTERNAL MEDICINE

## 2025-07-08 PROCEDURE — 84443 ASSAY THYROID STIM HORMONE: CPT | Performed by: INTERNAL MEDICINE

## 2025-07-08 PROCEDURE — 80053 COMPREHEN METABOLIC PANEL: CPT | Performed by: INTERNAL MEDICINE

## 2025-07-08 PROCEDURE — 99214 OFFICE O/P EST MOD 30 MIN: CPT | Mod: 25 | Performed by: INTERNAL MEDICINE

## 2025-07-08 PROCEDURE — 3078F DIAST BP <80 MM HG: CPT | Performed by: INTERNAL MEDICINE

## 2025-07-08 PROCEDURE — 36415 COLL VENOUS BLD VENIPUNCTURE: CPT | Performed by: INTERNAL MEDICINE

## 2025-07-08 PROCEDURE — 85025 COMPLETE CBC W/AUTO DIFF WBC: CPT | Performed by: INTERNAL MEDICINE

## 2025-07-08 PROCEDURE — 80061 LIPID PANEL: CPT | Performed by: INTERNAL MEDICINE

## 2025-07-08 RX ORDER — LEVOTHYROXINE SODIUM 125 UG/1
125 TABLET ORAL DAILY
Qty: 90 TABLET | Refills: 3 | Status: SHIPPED | OUTPATIENT
Start: 2025-07-08

## 2025-07-08 RX ORDER — TRIAMCINOLONE ACETONIDE 1 MG/G
CREAM TOPICAL 2 TIMES DAILY
Qty: 453 G | Refills: 2 | Status: SHIPPED | OUTPATIENT
Start: 2025-07-08

## 2025-07-08 RX ORDER — ESCITALOPRAM OXALATE 10 MG/1
10 TABLET ORAL DAILY
Qty: 90 TABLET | Refills: 3 | Status: SHIPPED | OUTPATIENT
Start: 2025-07-08

## 2025-07-08 RX ORDER — CYANOCOBALAMIN 1000 UG/ML
1000 INJECTION, SOLUTION INTRAMUSCULAR; SUBCUTANEOUS
Status: ACTIVE | OUTPATIENT
Start: 2025-07-08 | End: 2026-08-02

## 2025-07-08 RX ORDER — HYDROXYCHLOROQUINE SULFATE 200 MG/1
200 TABLET, FILM COATED ORAL DAILY
Qty: 90 TABLET | Refills: 3 | Status: SHIPPED | OUTPATIENT
Start: 2025-07-08 | End: 2026-07-08

## 2025-07-08 RX ADMIN — CYANOCOBALAMIN 1000 MCG: 1000 INJECTION, SOLUTION INTRAMUSCULAR; SUBCUTANEOUS at 13:38

## 2025-07-08 SDOH — HEALTH STABILITY: PHYSICAL HEALTH: ON AVERAGE, HOW MANY MINUTES DO YOU ENGAGE IN EXERCISE AT THIS LEVEL?: 60 MIN

## 2025-07-08 SDOH — HEALTH STABILITY: PHYSICAL HEALTH: ON AVERAGE, HOW MANY DAYS PER WEEK DO YOU ENGAGE IN MODERATE TO STRENUOUS EXERCISE (LIKE A BRISK WALK)?: 2 DAYS

## 2025-07-08 ASSESSMENT — SOCIAL DETERMINANTS OF HEALTH (SDOH): HOW OFTEN DO YOU GET TOGETHER WITH FRIENDS OR RELATIVES?: ONCE A WEEK

## 2025-07-08 ASSESSMENT — PATIENT HEALTH QUESTIONNAIRE - PHQ9
SUM OF ALL RESPONSES TO PHQ QUESTIONS 1-9: 0
SUM OF ALL RESPONSES TO PHQ QUESTIONS 1-9: 0
10. IF YOU CHECKED OFF ANY PROBLEMS, HOW DIFFICULT HAVE THESE PROBLEMS MADE IT FOR YOU TO DO YOUR WORK, TAKE CARE OF THINGS AT HOME, OR GET ALONG WITH OTHER PEOPLE: SOMEWHAT DIFFICULT

## 2025-07-08 NOTE — PROGRESS NOTES
Preventive Care Visit  [facility]  Jalil Manjarrez MD, Internal Medicine  Jul 8, 2025  {Provider  Link to [facility] :640917}    {PROVIDER CHARTING PREFERENCE:531877}    Karin Anderson is a 79 year old, presenting for the following:  Annual Visit        7/8/2025    12:28 PM   Additional Questions   Roomed by Familia OLIVER RN         7/8/2025   Forms   Any forms needing to be completed Yes          HPI  ***   {MA/LPN/RN Pre-Provider Visit Orders- hCG/UA/Strep (Optional):786153}  {SUPERLIST (Optional):720709}  {additonal problems for provider to add (Optional):078585}  Advance Care Planning  {The storyboard will display whether the patient has ACP docs on file. Hover over the Code section in the storyboard to access the ACP documents. :722999}  {(AWV REQUIRED) Advance Care Planning Reviewed:798635}        7/8/2025   General Health   How would you rate your overall physical health? Good   Feel stress (tense, anxious, or unable to sleep) Not at all         7/8/2025   Nutrition   Diet: Regular (no restrictions)         7/8/2025   Exercise   Days per week of moderate/strenous exercise 2 days   Average minutes spent exercising at this level 60 min   (!) EXERCISE CONCERN      7/8/2025   Social Factors   Frequency of gathering with friends or relatives Once a week   Worry food won't last until get money to buy more No   Food not last or not have enough money for food? No   Do you have housing? (Housing is defined as stable permanent housing and does not include staying outside in a car, in a tent, in an abandoned building, in an overnight shelter, or couch-surfing.) Yes   Are you worried about losing your housing? No   Lack of transportation? No   Unable to get utilities (heat,electricity)? No         7/8/2025   Fall Risk   Fallen 2 or more times in the past year? No   Trouble with walking or balance? No          7/8/2025   Activities of Daily Living- Home Safety   Needs help with the following daily  activites None of the above   Safety concerns in the home None of the above         7/8/2025   Dental   Dentist two times every year? Yes         7/8/2025   Hearing Screening   Hearing concerns? None of the above         7/8/2025   Driving Risk Screening   Patient/family members have concerns about driving No         7/8/2025   General Alertness/Fatigue Screening   Have you been more tired than usual lately? No         7/8/2025   Urinary Incontinence Screening   Bothered by leaking urine in past 6 months Yes       Today's PHQ-9 Score:       7/8/2025    12:10 PM   PHQ-9 SCORE   PHQ-9 Total Score MyChart 0   PHQ-9 Total Score 0        Patient-reported         7/8/2025   Substance Use   Alcohol more than 3/day or more than 7/wk No   Do you have a current opioid prescription? No   How severe/bad is pain from 1 to 10? 7/10   Do you use any other substances recreationally? No     Social History     Tobacco Use    Smoking status: Never    Smokeless tobacco: Never    Tobacco comments:     in college social smoker   Vaping Use    Vaping status: Never Used   Substance Use Topics    Alcohol use: Yes     Alcohol/week: 10.0 - 12.0 standard drinks of alcohol    Drug use: No     {Provider  If there are gaps in the social history shown above, please follow the link to update and then refresh the note Link to Social and Substance History :459582}    {Link to Fracture Risk Assessment Tool (Optional):849276}    {Provider  REQUIRED FOR AWV Use the storyboard to review patient history, after sections have been marked as reviewed, refresh note to capture documentation:696301}  {Provider   REQUIRED AWV use this link to review and update sexual activity history  after section has been marked as reviewed, refresh note to capture documentation:998026}  Reviewed and updated as needed this visit by Provider                    {HISTORY OPTIONS (Optional):074918}  Current providers sharing in care for this patient include:  Patient Care  "Team:  Jalil Manjarrez MD as PCP - General (Internal Medicine)  Turner Sahni MD as MD (Hematology & Oncology)  Jalil Manjarrez MD as Assigned PCP  Turner Sahni MD as Assigned Cancer Care Provider  Wilda Dahl, RN as Specialty Care Coordinator (Hematology & Oncology)  Anderson Salazar PA-C as Assigned Musculoskeletal Provider    The following health maintenance items are reviewed in Epic and correct as of today:  Health Maintenance   Topic Date Due    RSV VACCINE (1 - 1-dose 75+ series) Never done    DTAP/TDAP/TD VACCINE (3 - Td or Tdap) 08/25/2021    COVID-19 VACCINE (8 - Moderna risk 2024-25 season) 03/26/2025    MEDICARE ANNUAL WELLNESS VISIT  04/15/2025    ANNUAL REVIEW OF HM ORDERS  04/15/2025    LIPID  04/17/2025    INFLUENZA VACCINE (1) 09/01/2025    TSH W/FREE T4 REFLEX  12/13/2025    PHQ-9  01/08/2026    FALL RISK ASSESSMENT  07/08/2026    DIABETES SCREENING  12/13/2027    ADVANCE CARE PLANNING  04/15/2029    DEPRESSION ACTION PLAN  Completed    PNEUMOCOCCAL VACCINE 50+ YEARS  Completed    ZOSTER VACCINE  Completed    HPV VACCINE  Aged Out    MENINGITIS VACCINE  Aged Out    COLORECTAL CANCER SCREENING  Discontinued       {ROS Picklists (Optional):730705}     Objective    Exam  /72 (BP Location: Left arm, Patient Position: Sitting, Cuff Size: Adult Regular)   Pulse 74   Temp 98.4  F (36.9  C) (Tympanic)   Resp 15   Ht 1.816 m (5' 11.5\")   Wt 89 kg (196 lb 4.8 oz)   SpO2 96%   BMI 27.00 kg/m     Estimated body mass index is 27 kg/m  as calculated from the following:    Height as of this encounter: 1.816 m (5' 11.5\").    Weight as of this encounter: 89 kg (196 lb 4.8 oz).    Physical Exam  {Exam Choices (Optional):321399}        7/8/2025   Mini Cog   Clock Draw Score 2 Normal   3 Item Recall 3 objects recalled   Mini Cog Total Score 5     {A Mini-Cog total score of 0-2 suggests the possibility of dementia, score of 3-5 suggests no dementia:309306}         Signed " Electronically by: Jalil Manjarrez MD  {Email feedback regarding this note to [email or web address]   :313083}  Answers submitted by the patient for this visit:  Patient Health Questionnaire (Submitted on 7/8/2025)  If you checked off any problems, how difficult have these problems made it for you to do your work, take care of things at home, or get along with other people?: Somewhat difficult  PHQ9 TOTAL SCORE: 0

## 2025-07-08 NOTE — PROGRESS NOTES
PREVENTIVE CARE VISIT--Face to Face  Jul 8, 2025    Assessment and Plan:     ASSESSMENT and PLAN:  1. Medicare annual wellness visit, subsequent (Primary)  - REVIEW OF HEALTH MAINTENANCE PROTOCOL ORDERS    2. Perforation of left tympanic membrane  ENT visit upcoming.  Probable sinusitis complicated by airplane pressure  - cyanocobalamin injection 1,000 mcg    3. Osteopenia  Currently on drug-free holiday off alendronate    4. Major depressive disorder with single episode, in remission  Stable.  Update labs.  Refill meds  - escitalopram (LEXAPRO) 10 MG tablet; Take 1 tablet (10 mg) by mouth daily.  Dispense: 90 tablet; Refill: 3  - CBC with Platelets & Differential; Future  - Comprehensive metabolic panel; Future  - CBC with Platelets & Differential  - Comprehensive metabolic panel    5. Neuropathy  - escitalopram (LEXAPRO) 10 MG tablet; Take 1 tablet (10 mg) by mouth daily.  Dispense: 90 tablet; Refill: 3  - cyanocobalamin injection 1,000 mcg    6. Hypothyroidism due to acquired atrophy of thyroid  - levothyroxine (SYNTHROID/LEVOTHROID) 125 MCG tablet; Take 1 tablet (125 mcg) by mouth daily.  Dispense: 90 tablet; Refill: 3  - TSH; Future  - TSH    7. Pure hypercholesterolemia  Trial of different medications through MyChart  - Lipid Profile; Future  - Lipid Profile    8. Intrinsic eczema  Okay to refill  - triamcinolone (KENALOG) 0.1 % external cream; Apply topically 2 times daily.  Dispense: 453 g; Refill: 2    9. 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 Urge incontinence of urine  Rule out urine infection.  Status post prostatectomy  - UA Macroscopic with reflex to Microscopic and Culture; Future  - UA Macroscopic with reflex to Microscopic and Culture    11. Primary osteoarthritis of right hip  Reviewed x-ray.  Referral to orthopedics.  Trial of resumed Plaquenil  - Orthopedic  Referral; Future  - hydroxychloroquine (PLAQUENIL) 200 MG tablet; Take 1 tablet  (200 mg) by mouth daily.  Dispense: 90 tablet; Refill: 3    12. Connective tissue disease  Reviewed rheumatology note  - hydroxychloroquine (PLAQUENIL) 200 MG tablet; Take 1 tablet (200 mg) by mouth daily.  Dispense: 90 tablet; Refill: 3       Patient Instructions   Bone density September 2024 showed osteopenia.  Treatment is walking.  You took alendronate for years, and have now stopped it.  We will repeat bone density in September of 2026, although we can try earlier    Update labs    Update a vitamin B 12 shot    Check urine    Refer to orthopedic surgeon to discuss possible surgery.  Also consider [place] [place] [place] [facility]    You saw a rheumatologist last year for hands, and tried hydroxychloroquine which did not help    Refilled triamcinolone cream            Return in about 1 year (around 7/8/2026) for in person.         Subjective:   Justin is a 79 year old male who presents to the clinic today for an Annual Wellness Visit.    CHIEF COMPLAINT:  Chief Complaint   Patient presents with    Annual Visit       HPI: Complains of right hip pain.  X-rays done several months ago showed bone-on-bone osteoarthritis.  Consider possibility of disc in back.  Seen by sports medicine PA and referred to orthopedics but he did not see orthopedics yet    Perforated left ear after flying after viral infection.  Due to see ENT    Prostate cancer status post radical prostatectomy, with recurrence currently on Lupron shots.  Has been noticing more incontinence    Took alendronate for many years.  Most recent bone density showed osteopenia and stability and alendronate discontinued by oncology    Given Plaquenil for bilateral hand pain through rheumatology.  Prescription ran out and was not refilled.  Discussed resumption, and possibility that it could help hip pain    Review of Systems: Uses triamcinolone for dermatitis of legs.  Finds B12 shots very helpful    Current Outpatient Medications   Medication Sig Dispense  "Refill    clobetasol propionate (TEMOVATE) 0.05 % external cream APPLY TO DISCOLORED AREAS TWICE A DAY 60 g 3    escitalopram (LEXAPRO) 10 MG tablet TAKE ONE TABLET BY MOUTH ONE TIME DAILY 90 tablet 3    fluvastatin (LESCOL) 20 MG capsule TAKE ONE CAPSULE BY MOUTH AT BEDTIME 90 capsule 3    latanoprost (XALATAN) 0.005 % ophthalmic solution INSTILL ONE DROP INTO EACH EYE ONCE DAILY 7.5 mL 0    leuprolide (LUPRON DEPOT, 6-MONTH,) 45 MG kit Inject 45 mg into the muscle every 6 months      levothyroxine (SYNTHROID/LEVOTHROID) 125 MCG tablet TAKE ONE TABLET BY MOUTH ONE TIME DAILY 90 tablet 0    Turmeric 500 MG CAPS Take by mouth.      alendronate (FOSAMAX) 70 MG tablet TAKE 1 TABLET BY MOUTH ONCE WEEKLY (Patient not taking: Reported on 7/8/2025) 12 tablet 11    budesonide (ENTOCORT EC) 3 MG EC capsule Take 3 tabs by mouth daily for 1 month, then 2 tabs daily for 1 month, then 1 tab daily for 1 month (Patient not taking: Reported on 7/8/2025) 90 capsule 1    budesonide (ENTOCORT EC) 3 MG EC capsule Take 2 capsules (6 mg) by mouth every morning for 30 days, THEN 1 capsule (3 mg) every morning for 30 days. 90 capsule 0    clotrimazole (LOTRIMIN) 1 % external cream Apply topically 2 times daily (Patient not taking: Reported on 7/8/2025) 30 g 11    diclofenac (VOLTAREN) 50 MG EC tablet Take 1 tablet (50 mg) by mouth daily as needed for moderate pain. (Patient not taking: Reported on 7/8/2025) 42 tablet 0    hydroxychloroquine (PLAQUENIL) 200 MG tablet Take 1 tablet (200 mg) by mouth daily. (Patient not taking: Reported on 7/8/2025)         Objective:   Exam  /72 (BP Location: Left arm, Patient Position: Sitting, Cuff Size: Adult Regular)   Pulse 74   Temp 98.4  F (36.9  C) (Tympanic)   Resp 15   Ht 1.816 m (5' 11.5\")   Wt 89 kg (196 lb 4.8 oz)   SpO2 96%   BMI 27.00 kg/m     Estimated body mass index is 27 kg/m  as calculated from the following:    Height as of this encounter: 1.816 m (5' 11.5\").    Weight as of " this encounter: 89 kg (196 lb 4.8 oz).    PHYSICAL EXAM:  Wearing mask when entering room?  No  Constitutional:  Reveals pleasant man who ambulates with a slight limp but without assistance  Palpation of radial pulse regular   Ears:  Clear without wax.  No obvious perforation of left tympanic membrane  Thyroid:  Non palpable   Cardiac:  Regular rate and rhythm   Lungs: Clear.  Respiratory effort normal.  Edema of Legs: None   Psychiatric:  Alert and oriented   Mallampati class 2  Eczema of legs    Cognitive Screening   Completely normal to conversational questioning            7/8/2025   Mini Cog   Clock Draw Score 2 Normal   3 Item Recall 3 objects recalled   Mini Cog Total Score 5              Recent Labs   Lab Test 04/16/25  0854 12/13/24  1238 09/19/24  1307 04/17/24  0757   CHOL  --   --   --  214*   GLC  --  87  --  108*   VITDT  --   --   --  40   PSA <0.01  --  <0.01  --        Preventive Care Documentation    Patient Active Problem List   Diagnosis    Benign Adenomatous Polyp Of The Large Intestine    Osteopenia    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)    Vitamin B12 Deficiency    Vitamin D Deficiency    Lipid disorder    Hypothyroidism due to acquired atrophy of thyroid    Male Erectile Disorder    Neuropathy    Osteoarthritis    Sinus Bradycardia    Urinary incontinence    Glaucoma suspect, left    Collagenous colitis    Malignant neoplasm metastatic to intrapelvic lymph node (H)    Rising PSA following treatment for malignant neoplasm of prostate     Past Medical History:   Diagnosis Date    Gout     Hyperlipidemia     Hypothyroidism     Prostate cancer (H)       Past Surgical History:   Procedure Laterality Date    APPENDECTOMY      CATARACT EXTRACTION      COLONOSCOPY  12/11/2017    HERNIA REPAIR      PROSTATE SURGERY  1992    [facility] APPENDECTOMY      Description: Appendectomy;  Recorded: 03/17/2008;    [facility] REMV PROSTATE,RETROPUB,RAD,LTD NODES      Description: Prostatectomy Retropubic  Radical With Lymph Node Biopsy(S);  Recorded: 03/25/2007;      Family History   Problem Relation Age of Onset    Multiple Sclerosis Mother     Heart Disease Father     Aortic aneurysm Father     Cerebrovascular Disease Sister     No Known Problems Sister     No Known Problems Daughter     No Known Problems Daughter       Social History     Socioeconomic History    Marital status:      Spouse name: Not on file    Number of children: 2    Years of education: Not on file    Highest education level: Not on file   Occupational History    Not on file   Tobacco Use    Smoking status: Never    Smokeless tobacco: Never    Tobacco comments:     in college social smoker   Vaping Use    Vaping status: Never Used   Substance and Sexual Activity    Alcohol use: Yes     Alcohol/week: 10.0 - 12.0 standard drinks of alcohol    Drug use: No    Sexual activity: Never   Other Topics Concern    Not on file   Social History Narrative     of [employer] in [place], now retired        Lives with a significant other.  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        Rehabbed a boat on [facility] 2021        [employer]     Social Drivers of Health     Financial Resource Strain: Low Risk  (7/8/2025)    Financial Resource Strain     Within the past 12 months, have you or your family members you live with been unable to get utilities (heat, electricity) when it was really needed?: No   Food Insecurity: Low Risk  (7/8/2025)    Food Insecurity     Within the past 12 months, did you worry that your food would run out before you got money to buy more?: No     Within the past 12 months, did the food you bought just not last and you didn t have money to get more?: No   Transportation Needs: Low Risk  (7/8/2025)    Transportation Needs     Within the past 12 months, has lack of transportation kept you from medical appointments, getting your medicines, non-medical meetings or appointments, work, or from getting things  that you need?: No   Physical Activity: Insufficiently Active (7/8/2025)    Exercise Vital Sign     Days of Exercise per Week: 2 days     Minutes of Exercise per Session: 60 min   Stress: No Stress Concern Present (7/8/2025)    [place] [employer] - Occupational Stress Questionnaire     Feeling of Stress : Not at all   Social Connections: Unknown (7/8/2025)    Social Connection and Isolation Panel [NHANES]     Frequency of Communication with Friends and Family: Not on file     Frequency of Social Gatherings with Friends and Family: Once a week     Attends Religion Services: Not on file     Active Member of Clubs or Organizations: Not on file     Attends Club or Organization Meetings: Not on file     Marital Status: Not on file   Interpersonal Safety: Low Risk  (7/8/2025)    Interpersonal Safety     Do you feel physically and emotionally safe where you currently live?: Yes     Within the past 12 months, have you been hit, slapped, kicked or otherwise physically hurt by someone?: No     Within the past 12 months, have you been humiliated or emotionally abused in other ways by your partner or ex-partner?: No   Housing Stability: Low Risk  (7/8/2025)    Housing Stability     Do you have housing? : Yes     Are you worried about losing your housing?: No      Spends a month on [employer] reach someone        7/8/2025    12:28 PM   Additional Questions   Roomed by Familia OLIVER RN         7/8/2025   Forms   Any forms needing to be completed Yes         Health Care Directive  Patient does not have a Health Care Directive: Advance Directive received and scanned. Click on Code in the patient header to view.        7/8/2025   Fall Risk   Fallen 2 or more times in the past year? No   Trouble with walking or balance? No            7/8/2025   General Health   How would you rate your overall physical health? Good   Feel stress (tense, anxious, or unable to sleep) Not at all         7/8/2025   Nutrition   Diet:  Regular (no restrictions)         7/8/2025   Exercise   Days per week of moderate/strenous exercise 2 days   Average minutes spent exercising at this level 60 min   (!) EXERCISE CONCERN      7/8/2025   Social Factors   Frequency of gathering with friends or relatives Once a week   Worry food won't last until get money to buy more No   Food not last or not have enough money for food? No   Do you have housing? (Housing is defined as stable permanent housing and does not include staying outside in a car, in a tent, in an abandoned building, in an overnight shelter, or couch-surfing.) Yes   Are you worried about losing your housing? No   Lack of transportation? No   Unable to get utilities (heat,electricity)? No             7/8/2025   Fall Risk   Fallen 2 or more times in the past year? No   Trouble with walking or balance? No          7/8/2025   Fall Risk   Fallen 2 or more times in the past year? No   Trouble with walking or balance? No           7/8/2025   Activities of Daily Living- Home Safety   Needs help with the following daily activites None of the above   Safety concerns in the home None of the above         7/8/2025   Dental   Dentist two times every year? Yes         7/8/2025   Hearing Screening   Hearing concerns? None of the above         7/8/2025   Driving Risk Screening   Patient/family members have concerns about driving No         7/8/2025   General Alertness/Fatigue Screening   Have you been more tired than usual lately? No         7/8/2025   Urinary Incontinence Screening   Bothered by leaking urine in past 6 months Yes       Today's PHQ-9 Score:       7/8/2025    12:10 PM   PHQ-9 SCORE   PHQ-9 Total Score MyChart 0   PHQ-9 Total Score 0        Patient-reported         7/8/2025   Substance Use   Alcohol more than 3/day or more than 7/wk No   Do you have a current opioid prescription? No   How severe/bad is pain from 1 to 10? 7/10   Do you use any other substances recreationally? No              Reviewed and updated as needed this visit by Provider                    Current providers sharing in care for this patient include:  Patient Care Team:  Jalil Manjarrez MD as PCP - General (Internal Medicine)  Turner Sahni MD as MD (Hematology & Oncology)  Jalil Manjarrez MD as Assigned PCP  Turner Sahni MD as Assigned Cancer Care Provider  Wilda Dahl, RN as Specialty Care Coordinator (Hematology & Oncology)  Anderson Salazar PA-C as Assigned Musculoskeletal Provider    The following health maintenance items are reviewed in Epic and correct as of today:  Health Maintenance   Topic Date Due    RSV VACCINE (1 - 1-dose 75+ series) Never done    DTAP/TDAP/TD VACCINE (3 - Td or Tdap) 08/25/2021    COVID-19 VACCINE (8 - Moderna risk 2024-25 season) 03/26/2025    MEDICARE ANNUAL WELLNESS VISIT  04/15/2025    ANNUAL REVIEW OF HM ORDERS  04/15/2025    LIPID  04/17/2025    INFLUENZA VACCINE (1) 09/01/2025    TSH W/FREE T4 REFLEX  12/13/2025    PHQ-9  01/08/2026    FALL RISK ASSESSMENT  07/08/2026    DIABETES SCREENING  12/13/2027    ADVANCE CARE PLANNING  04/15/2029    DEPRESSION ACTION PLAN  Completed    PNEUMOCOCCAL VACCINE 50+ YEARS  Completed    ZOSTER VACCINE  Completed    HPV VACCINE  Aged Out    MENINGITIS VACCINE  Aged Out    COLORECTAL CANCER SCREENING  Discontinued     Reviewed and updated as needed this visit by clinical staff   Tobacco  Allergies  Meds                Counseling  Appropriate preventive services were addressed with this patient via screening, questionnaire, or discussion as appropriate for fall prevention, nutrition, physical activity, Tobacco-use cessation, social engagement, weight loss and cognition.  Checklist reviewing preventive services available has been given to the patient.  Reviewed patient's diet, addressing concerns and/or questions.   He is at risk for lack of exercise and has been provided with information to increase physical activity for the  benefit of his well-being.   Information on urinary incontinence and treatment options given to patient.             VisionScreening:  Last done yearly    Start Time: 1:00 PM  End time:  1:30 PM  Face to face plus orders: 30 minutes  Documentation time:  3 minutes    The visit lasted a total of 33 minutes     Jalil Manjarrez MD  Internal Medicine  [facility]    The ongoing plan of care, for the conditions documented in the note above, were addressed during this visit.  Due to the chronic nature of the issues and uncertain outcome of changes made today, I will continue to support Justin in the ongoing management of these conditions and continuity of care by access to [facility] messages, phone calls, and follow-up appointments.  Answers submitted by the patient for this visit:  Patient Health Questionnaire (Submitted on 7/8/2025)  If you checked off any problems, how difficult have these problems made it for you to do your work, take care of things at home, or get along with other people?: Somewhat difficult  PHQ9 TOTAL SCORE: 0

## 2025-07-08 NOTE — PATIENT INSTRUCTIONS
Bone density September 2024 showed osteopenia.  Treatment is walking.  You took alendronate for years, and have now stopped it.  We will repeat bone density in September of 2026, although we can try earlier    Update labs    Update a vitamin B 12 shot    Check urine    Refer to orthopedic surgeon to discuss possible surgery.  Also consider [place] [place] [place] [facility]    You saw a rheumatologist last year for hands, and tried hydroxychloroquine which did not help    Refilled triamcinolone cream

## 2025-07-09 ENCOUNTER — PATIENT OUTREACH (OUTPATIENT)
Dept: CARE COORDINATION | Facility: CLINIC | Age: 80
End: 2025-07-09
Payer: COMMERCIAL

## 2025-07-09 LAB
ALBUMIN SERPL BCG-MCNC: 4.5 G/DL (ref 3.5–5.2)
ALP SERPL-CCNC: 52 U/L (ref 40–150)
ALT SERPL W P-5'-P-CCNC: 20 U/L (ref 0–70)
ANION GAP SERPL CALCULATED.3IONS-SCNC: 11 MMOL/L (ref 7–15)
AST SERPL W P-5'-P-CCNC: 28 U/L (ref 0–45)
BILIRUB SERPL-MCNC: 0.7 MG/DL
BUN SERPL-MCNC: 13.5 MG/DL (ref 8–23)
CALCIUM SERPL-MCNC: 9.6 MG/DL (ref 8.8–10.4)
CHLORIDE SERPL-SCNC: 101 MMOL/L (ref 98–107)
CHOLEST SERPL-MCNC: 259 MG/DL
CREAT SERPL-MCNC: 0.79 MG/DL (ref 0.67–1.17)
EGFRCR SERPLBLD CKD-EPI 2021: 90 ML/MIN/1.73M2
FASTING STATUS PATIENT QL REPORTED: NO
FASTING STATUS PATIENT QL REPORTED: NO
GLUCOSE SERPL-MCNC: 84 MG/DL (ref 70–99)
HCO3 SERPL-SCNC: 24 MMOL/L (ref 22–29)
HDLC SERPL-MCNC: 38 MG/DL
LDLC SERPL CALC-MCNC: 141 MG/DL
NONHDLC SERPL-MCNC: 221 MG/DL
POTASSIUM SERPL-SCNC: 4.1 MMOL/L (ref 3.4–5.3)
PROT SERPL-MCNC: 7.1 G/DL (ref 6.4–8.3)
SODIUM SERPL-SCNC: 136 MMOL/L (ref 135–145)
TRIGL SERPL-MCNC: 398 MG/DL
TSH SERPL DL<=0.005 MIU/L-ACNC: 2.03 UIU/ML (ref 0.3–4.2)

## 2025-07-28 DIAGNOSIS — E78.00 PURE HYPERCHOLESTEROLEMIA: ICD-10-CM

## 2025-08-04 ENCOUNTER — TRANSFERRED RECORDS (OUTPATIENT)
Dept: HEALTH INFORMATION MANAGEMENT | Facility: CLINIC | Age: 80
End: 2025-08-04
Payer: COMMERCIAL